# Patient Record
Sex: MALE | Race: WHITE | NOT HISPANIC OR LATINO | Employment: OTHER | URBAN - METROPOLITAN AREA
[De-identification: names, ages, dates, MRNs, and addresses within clinical notes are randomized per-mention and may not be internally consistent; named-entity substitution may affect disease eponyms.]

---

## 2017-01-11 ENCOUNTER — GENERIC CONVERSION - ENCOUNTER (OUTPATIENT)
Dept: OTHER | Facility: OTHER | Age: 47
End: 2017-01-11

## 2017-01-12 ENCOUNTER — GENERIC CONVERSION - ENCOUNTER (OUTPATIENT)
Dept: OTHER | Facility: OTHER | Age: 47
End: 2017-01-12

## 2017-01-16 ENCOUNTER — OFFICE VISIT (OUTPATIENT)
Dept: URGENT CARE | Facility: MEDICAL CENTER | Age: 47
End: 2017-01-16
Payer: COMMERCIAL

## 2017-01-16 PROCEDURE — 99203 OFFICE O/P NEW LOW 30 MIN: CPT

## 2017-02-07 ENCOUNTER — ALLSCRIPTS OFFICE VISIT (OUTPATIENT)
Dept: OTHER | Facility: OTHER | Age: 47
End: 2017-02-07

## 2017-02-28 ENCOUNTER — TRANSCRIBE ORDERS (OUTPATIENT)
Dept: ADMINISTRATIVE | Facility: HOSPITAL | Age: 47
End: 2017-02-28

## 2017-02-28 DIAGNOSIS — R91.8 PULMONARY NODULES: Primary | ICD-10-CM

## 2017-03-01 ENCOUNTER — GENERIC CONVERSION - ENCOUNTER (OUTPATIENT)
Dept: OTHER | Facility: OTHER | Age: 47
End: 2017-03-01

## 2017-03-07 ENCOUNTER — HOSPITAL ENCOUNTER (OUTPATIENT)
Dept: CT IMAGING | Facility: HOSPITAL | Age: 47
Discharge: HOME | End: 2017-03-07
Attending: INTERNAL MEDICINE
Payer: COMMERCIAL

## 2017-03-07 DIAGNOSIS — R91.8 OTHER NONSPECIFIC ABNORMAL FINDING OF LUNG FIELD: ICD-10-CM

## 2017-03-07 DIAGNOSIS — I48.91 ATRIAL FIBRILLATION (HCC): ICD-10-CM

## 2017-03-07 DIAGNOSIS — N18.30 CHRONIC KIDNEY DISEASE, STAGE III (MODERATE) (HCC): ICD-10-CM

## 2017-03-07 DIAGNOSIS — R41.3 OTHER AMNESIA: ICD-10-CM

## 2017-03-07 DIAGNOSIS — D68.62 LUPUS ANTICOAGULANT SYNDROME (HCC): ICD-10-CM

## 2017-03-07 DIAGNOSIS — I10 ESSENTIAL (PRIMARY) HYPERTENSION: ICD-10-CM

## 2017-03-07 DIAGNOSIS — E87.1 HYPO-OSMOLALITY AND HYPONATREMIA: ICD-10-CM

## 2017-03-07 DIAGNOSIS — Q61.3 POLYCYSTIC KIDNEY: ICD-10-CM

## 2017-03-07 PROCEDURE — 71250 CT THORAX DX C-: CPT

## 2017-03-08 ENCOUNTER — ALLSCRIPTS OFFICE VISIT (OUTPATIENT)
Dept: OTHER | Facility: OTHER | Age: 47
End: 2017-03-08

## 2017-03-16 ENCOUNTER — ALLSCRIPTS OFFICE VISIT (OUTPATIENT)
Dept: OTHER | Facility: OTHER | Age: 47
End: 2017-03-16

## 2017-03-22 ENCOUNTER — ALLSCRIPTS OFFICE VISIT (OUTPATIENT)
Dept: OTHER | Facility: OTHER | Age: 47
End: 2017-03-22

## 2017-04-05 ENCOUNTER — APPOINTMENT (OUTPATIENT)
Dept: LAB | Facility: HOSPITAL | Age: 47
End: 2017-04-05
Attending: INTERNAL MEDICINE
Payer: COMMERCIAL

## 2017-04-05 ENCOUNTER — TRANSCRIBE ORDERS (OUTPATIENT)
Dept: LAB | Facility: HOSPITAL | Age: 47
End: 2017-04-05

## 2017-04-05 DIAGNOSIS — I10 ESSENTIAL (PRIMARY) HYPERTENSION: ICD-10-CM

## 2017-04-05 DIAGNOSIS — N18.30 CHRONIC KIDNEY DISEASE, STAGE III (MODERATE) (HCC): Primary | ICD-10-CM

## 2017-04-05 DIAGNOSIS — Q61.3 POLYCYSTIC KIDNEY: ICD-10-CM

## 2017-04-05 DIAGNOSIS — N18.30 CHRONIC KIDNEY DISEASE, STAGE III (MODERATE) (HCC): ICD-10-CM

## 2017-04-05 DIAGNOSIS — E87.1 HYPO-OSMOLALITY AND HYPONATREMIA: ICD-10-CM

## 2017-04-05 LAB
ANION GAP SERPL CALCULATED.3IONS-SCNC: 5 MMOL/L (ref 4–13)
BUN SERPL-MCNC: 17 MG/DL (ref 5–25)
CALCIUM SERPL-MCNC: 9 MG/DL (ref 8.3–10.1)
CHLORIDE SERPL-SCNC: 104 MMOL/L (ref 100–108)
CO2 SERPL-SCNC: 30 MMOL/L (ref 21–32)
CREAT SERPL-MCNC: 1.24 MG/DL (ref 0.6–1.3)
ERYTHROCYTE [DISTWIDTH] IN BLOOD BY AUTOMATED COUNT: 12.1 % (ref 11.6–15.1)
GFR SERPL CREATININE-BSD FRML MDRD: >60 ML/MIN/1.73SQ M
GLUCOSE SERPL-MCNC: 148 MG/DL (ref 65–140)
HCT VFR BLD AUTO: 45.8 % (ref 36.5–49.3)
HGB BLD-MCNC: 15.8 G/DL (ref 12–17)
MCH RBC QN AUTO: 32 PG (ref 26.8–34.3)
MCHC RBC AUTO-ENTMCNC: 34.5 G/DL (ref 31.4–37.4)
MCV RBC AUTO: 93 FL (ref 82–98)
PHOSPHATE SERPL-MCNC: 2.4 MG/DL (ref 2.7–4.5)
PLATELET # BLD AUTO: 153 THOUSANDS/UL (ref 149–390)
PMV BLD AUTO: 11.2 FL (ref 8.9–12.7)
POTASSIUM SERPL-SCNC: 4.3 MMOL/L (ref 3.5–5.3)
PTH-INTACT SERPL-MCNC: 93.6 PG/ML (ref 14–72)
RBC # BLD AUTO: 4.94 MILLION/UL (ref 3.88–5.62)
SODIUM SERPL-SCNC: 139 MMOL/L (ref 136–145)
WBC # BLD AUTO: 4.3 THOUSAND/UL (ref 4.31–10.16)

## 2017-04-05 PROCEDURE — 83970 ASSAY OF PARATHORMONE: CPT

## 2017-04-05 PROCEDURE — 85705 THROMBOPLASTIN INHIBITION: CPT

## 2017-04-05 PROCEDURE — 80048 BASIC METABOLIC PNL TOTAL CA: CPT

## 2017-04-05 PROCEDURE — 85613 RUSSELL VIPER VENOM DILUTED: CPT

## 2017-04-05 PROCEDURE — 85598 HEXAGNAL PHOSPH PLTLT NEUTRL: CPT

## 2017-04-05 PROCEDURE — 84100 ASSAY OF PHOSPHORUS: CPT

## 2017-04-05 PROCEDURE — 36415 COLL VENOUS BLD VENIPUNCTURE: CPT

## 2017-04-05 PROCEDURE — 85732 THROMBOPLASTIN TIME PARTIAL: CPT

## 2017-04-05 PROCEDURE — 85670 THROMBIN TIME PLASMA: CPT

## 2017-04-05 PROCEDURE — 85027 COMPLETE CBC AUTOMATED: CPT

## 2017-04-08 LAB
APTT HEX PL PPP: 22 SEC (ref 0–11)
APTT SCREEN TO CONFIRM RATIO: 1.19 RATIO (ref 0–1.4)
APTT-LA IMM 4:1 NP PPP: 46.7 SEC (ref 0–40.6)
CONFIRM APTT/NORMAL: 44.7 SEC (ref 0–55)
DRVVT IMM 1:2 NP PPP: 44.4 SEC (ref 0–44)
DRVVT SCREEN TO CONFIRM RATIO: 1.2 RATIO (ref 0.8–1.2)
LA PPP-IMP: ABNORMAL
SCREEN APTT: 50.4 SEC (ref 0–43.6)
SCREEN DRVVT: 48.7 SEC (ref 0–44)
THROMBIN TIME: 17 SEC (ref 0–20.9)

## 2017-04-11 ENCOUNTER — HOSPITAL ENCOUNTER (OUTPATIENT)
Dept: MRI IMAGING | Facility: HOSPITAL | Age: 47
Discharge: HOME | End: 2017-04-11
Attending: PSYCHIATRY & NEUROLOGY
Payer: COMMERCIAL

## 2017-04-11 DIAGNOSIS — R20.2 PARESTHESIA OF SKIN: ICD-10-CM

## 2017-04-11 DIAGNOSIS — S09.90XA INJURY OF HEAD: ICD-10-CM

## 2017-04-11 PROCEDURE — 76377 3D RENDER W/INTRP POSTPROCES: CPT

## 2017-04-11 PROCEDURE — 70553 MRI BRAIN STEM W/O & W/DYE: CPT

## 2017-04-11 PROCEDURE — A9585 GADOBUTROL INJECTION: HCPCS | Performed by: PSYCHIATRY & NEUROLOGY

## 2017-04-11 RX ADMIN — GADOBUTROL 6 ML: 604.72 INJECTION INTRAVENOUS at 20:33

## 2017-04-14 ENCOUNTER — ALLSCRIPTS OFFICE VISIT (OUTPATIENT)
Dept: OTHER | Facility: OTHER | Age: 47
End: 2017-04-14

## 2017-04-18 ENCOUNTER — GENERIC CONVERSION - ENCOUNTER (OUTPATIENT)
Dept: OTHER | Facility: OTHER | Age: 47
End: 2017-04-18

## 2017-04-24 ENCOUNTER — GENERIC CONVERSION - ENCOUNTER (OUTPATIENT)
Dept: OTHER | Facility: OTHER | Age: 47
End: 2017-04-24

## 2017-04-25 ENCOUNTER — ALLSCRIPTS OFFICE VISIT (OUTPATIENT)
Dept: OTHER | Facility: OTHER | Age: 47
End: 2017-04-25

## 2017-05-01 ENCOUNTER — HOSPITAL ENCOUNTER (OUTPATIENT)
Dept: CT IMAGING | Facility: HOSPITAL | Age: 47
Discharge: HOME | End: 2017-05-01
Attending: INTERNAL MEDICINE
Payer: COMMERCIAL

## 2017-05-01 DIAGNOSIS — Q61.3 POLYCYSTIC KIDNEY: ICD-10-CM

## 2017-05-01 PROCEDURE — 74150 CT ABDOMEN W/O CONTRAST: CPT

## 2017-05-03 ENCOUNTER — ALLSCRIPTS OFFICE VISIT (OUTPATIENT)
Dept: OTHER | Facility: OTHER | Age: 47
End: 2017-05-03

## 2017-05-03 DIAGNOSIS — N18.30 CHRONIC KIDNEY DISEASE, STAGE III (MODERATE) (HCC): ICD-10-CM

## 2017-05-03 DIAGNOSIS — R93.0 ABNORMAL FINDINGS ON DIAGNOSTIC IMAGING OF SKULL AND HEAD, NOT ELSEWHERE CLASSIFIED: ICD-10-CM

## 2017-05-03 DIAGNOSIS — Q61.3 POLYCYSTIC KIDNEY: ICD-10-CM

## 2017-05-04 ENCOUNTER — TRANSCRIBE ORDERS (OUTPATIENT)
Dept: ADMINISTRATIVE | Facility: HOSPITAL | Age: 47
End: 2017-05-04

## 2017-05-04 ENCOUNTER — GENERIC CONVERSION - ENCOUNTER (OUTPATIENT)
Dept: OTHER | Facility: OTHER | Age: 47
End: 2017-05-04

## 2017-05-04 DIAGNOSIS — R93.0 FAMILIAL ENLARGEMENT OF THE SELLA TURCICA: Primary | ICD-10-CM

## 2017-05-04 DIAGNOSIS — N18.30 CHRONIC RENAL DISEASE, STAGE III (HCC): Primary | ICD-10-CM

## 2017-05-10 ENCOUNTER — ALLSCRIPTS OFFICE VISIT (OUTPATIENT)
Dept: OTHER | Facility: OTHER | Age: 47
End: 2017-05-10

## 2017-05-10 ENCOUNTER — TRANSCRIBE ORDERS (OUTPATIENT)
Dept: ADMINISTRATIVE | Facility: HOSPITAL | Age: 47
End: 2017-05-10

## 2017-05-10 ENCOUNTER — HOSPITAL ENCOUNTER (OUTPATIENT)
Dept: CT IMAGING | Facility: HOSPITAL | Age: 47
Discharge: HOME | End: 2017-05-10
Attending: INTERNAL MEDICINE
Payer: COMMERCIAL

## 2017-05-10 DIAGNOSIS — N18.30 CHRONIC KIDNEY DISEASE, STAGE III (MODERATE) (HCC): ICD-10-CM

## 2017-05-10 DIAGNOSIS — Q61.3 POLYCYSTIC KIDNEY: ICD-10-CM

## 2017-05-10 DIAGNOSIS — G56.21 LESION OF RIGHT ULNAR NERVE: Primary | ICD-10-CM

## 2017-05-10 PROCEDURE — 74160 CT ABDOMEN W/CONTRAST: CPT

## 2017-05-10 RX ADMIN — IODIXANOL 100 ML: 320 INJECTION, SOLUTION INTRAVASCULAR at 19:51

## 2017-05-15 ENCOUNTER — HOSPITAL ENCOUNTER (OUTPATIENT)
Dept: MRI IMAGING | Facility: HOSPITAL | Age: 47
Discharge: HOME | End: 2017-05-15
Payer: COMMERCIAL

## 2017-05-15 DIAGNOSIS — R93.0 ABNORMAL FINDINGS ON DIAGNOSTIC IMAGING OF SKULL AND HEAD, NOT ELSEWHERE CLASSIFIED: ICD-10-CM

## 2017-05-15 PROCEDURE — 72156 MRI NECK SPINE W/O & W/DYE: CPT

## 2017-05-15 PROCEDURE — A9585 GADOBUTROL INJECTION: HCPCS | Performed by: PSYCHIATRY & NEUROLOGY

## 2017-05-15 PROCEDURE — 72157 MRI CHEST SPINE W/O & W/DYE: CPT

## 2017-05-15 RX ADMIN — GADOBUTROL 6 ML: 604.72 INJECTION INTRAVENOUS at 20:17

## 2017-05-16 ENCOUNTER — GENERIC CONVERSION - ENCOUNTER (OUTPATIENT)
Dept: OTHER | Facility: OTHER | Age: 47
End: 2017-05-16

## 2017-05-17 ENCOUNTER — HOSPITAL ENCOUNTER (OUTPATIENT)
Dept: NEUROLOGY | Facility: CLINIC | Age: 47
Discharge: HOME | End: 2017-05-17
Payer: COMMERCIAL

## 2017-05-17 DIAGNOSIS — M79.609 PARESTHESIA AND PAIN OF RIGHT EXTREMITY: ICD-10-CM

## 2017-05-17 DIAGNOSIS — G56.21 LESION OF RIGHT ULNAR NERVE: ICD-10-CM

## 2017-05-17 DIAGNOSIS — R20.2 PARESTHESIA AND PAIN OF RIGHT EXTREMITY: ICD-10-CM

## 2017-05-17 PROCEDURE — 95886 MUSC TEST DONE W/N TEST COMP: CPT

## 2017-05-17 PROCEDURE — 95911 NRV CNDJ TEST 9-10 STUDIES: CPT

## 2017-05-19 ENCOUNTER — GENERIC CONVERSION - ENCOUNTER (OUTPATIENT)
Dept: OTHER | Facility: OTHER | Age: 47
End: 2017-05-19

## 2017-05-22 ENCOUNTER — GENERIC CONVERSION - ENCOUNTER (OUTPATIENT)
Dept: OTHER | Facility: OTHER | Age: 47
End: 2017-05-22

## 2017-05-23 ENCOUNTER — HOSPITAL ENCOUNTER (EMERGENCY)
Facility: HOSPITAL | Age: 47
Discharge: HOME | End: 2017-05-23
Attending: EMERGENCY MEDICINE | Admitting: EMERGENCY MEDICINE
Payer: COMMERCIAL

## 2017-05-23 ENCOUNTER — GENERIC CONVERSION - ENCOUNTER (OUTPATIENT)
Dept: OTHER | Facility: OTHER | Age: 47
End: 2017-05-23

## 2017-05-23 ENCOUNTER — APPOINTMENT (EMERGENCY)
Dept: ULTRASOUND IMAGING | Facility: HOSPITAL | Age: 47
End: 2017-05-23
Payer: COMMERCIAL

## 2017-05-23 VITALS
WEIGHT: 148 LBS | OXYGEN SATURATION: 96 % | TEMPERATURE: 98.1 F | DIASTOLIC BLOOD PRESSURE: 106 MMHG | HEART RATE: 74 BPM | RESPIRATION RATE: 16 BRPM | BODY MASS INDEX: 22.5 KG/M2 | SYSTOLIC BLOOD PRESSURE: 184 MMHG

## 2017-05-23 DIAGNOSIS — R10.9 LEFT FLANK PAIN: Primary | ICD-10-CM

## 2017-05-23 LAB
ANION GAP BLD CALC-SCNC: 18 MMOL/L (ref 4–13)
BACTERIA UR QL AUTO: ABNORMAL /HPF
BILIRUB UR QL STRIP: NEGATIVE
BUN BLD-MCNC: 21 MG/DL (ref 5–25)
CA-I BLD-SCNC: 1.18 MMOL/L (ref 1.12–1.32)
CHLORIDE BLD-SCNC: 100 MMOL/L (ref 100–108)
CLARITY UR: CLEAR
COLOR UR: YELLOW
COLOR, POC: YELLOW
CREAT BLD-MCNC: 1.1 MG/DL (ref 0.6–1.3)
GFR SERPL CREATININE-BSD FRML MDRD: >60 ML/MIN/1.73SQ M
GLUCOSE SERPL-MCNC: 81 MG/DL (ref 65–140)
GLUCOSE UR STRIP-MCNC: NEGATIVE MG/DL
HCT VFR BLD CALC: 49 % (ref 36.5–49.3)
HGB BLDA-MCNC: 16.7 G/DL (ref 12–17)
HGB UR QL STRIP.AUTO: ABNORMAL
KETONES UR STRIP-MCNC: NEGATIVE MG/DL
LEUKOCYTE ESTERASE UR QL STRIP: NEGATIVE
NITRITE UR QL STRIP: NEGATIVE
NON-SQ EPI CELLS URNS QL MICRO: ABNORMAL /HPF
PCO2 BLD: 27 MMOL/L (ref 21–32)
PH UR STRIP.AUTO: 6 [PH] (ref 4.5–8)
POTASSIUM BLD-SCNC: 4.2 MMOL/L (ref 3.5–5.3)
PROT UR STRIP-MCNC: NEGATIVE MG/DL
RBC #/AREA URNS AUTO: ABNORMAL /HPF
SODIUM BLD-SCNC: 140 MMOL/L (ref 136–145)
SP GR UR STRIP.AUTO: 1.01 (ref 1–1.03)
SPECIMEN SOURCE: ABNORMAL
UROBILINOGEN UR QL STRIP.AUTO: 0.2 E.U./DL
WBC #/AREA URNS AUTO: ABNORMAL /HPF

## 2017-05-23 PROCEDURE — 76770 US EXAM ABDO BACK WALL COMP: CPT

## 2017-05-23 PROCEDURE — 80047 BASIC METABLC PNL IONIZED CA: CPT

## 2017-05-23 PROCEDURE — 85014 HEMATOCRIT: CPT

## 2017-05-23 PROCEDURE — 99284 EMERGENCY DEPT VISIT MOD MDM: CPT

## 2017-05-23 PROCEDURE — 81001 URINALYSIS AUTO W/SCOPE: CPT

## 2017-05-23 PROCEDURE — 81002 URINALYSIS NONAUTO W/O SCOPE: CPT | Performed by: EMERGENCY MEDICINE

## 2017-05-24 ENCOUNTER — GENERIC CONVERSION - ENCOUNTER (OUTPATIENT)
Dept: OTHER | Facility: OTHER | Age: 47
End: 2017-05-24

## 2017-05-25 ENCOUNTER — GENERIC CONVERSION - ENCOUNTER (OUTPATIENT)
Dept: OTHER | Facility: OTHER | Age: 47
End: 2017-05-25

## 2017-06-06 ENCOUNTER — ALLSCRIPTS OFFICE VISIT (OUTPATIENT)
Dept: OTHER | Facility: OTHER | Age: 47
End: 2017-06-06

## 2017-06-06 DIAGNOSIS — N20.0 CALCULUS OF KIDNEY: ICD-10-CM

## 2017-06-06 LAB
CLARITY UR: NORMAL
COLOR UR: YELLOW
GLUCOSE (HISTORICAL): NORMAL
HGB UR QL STRIP.AUTO: NORMAL
KETONES UR STRIP-MCNC: NORMAL MG/DL
LEUKOCYTE ESTERASE UR QL STRIP: NORMAL
NITRITE UR QL STRIP: NORMAL
PH UR STRIP.AUTO: 6 [PH]
PROT UR STRIP-MCNC: NORMAL MG/DL
SP GR UR STRIP.AUTO: 1.01

## 2017-06-07 ENCOUNTER — ALLSCRIPTS OFFICE VISIT (OUTPATIENT)
Dept: OTHER | Facility: OTHER | Age: 47
End: 2017-06-07

## 2017-06-21 ENCOUNTER — GENERIC CONVERSION - ENCOUNTER (OUTPATIENT)
Dept: OTHER | Facility: OTHER | Age: 47
End: 2017-06-21

## 2017-08-03 ENCOUNTER — ALLSCRIPTS OFFICE VISIT (OUTPATIENT)
Dept: OTHER | Facility: OTHER | Age: 47
End: 2017-08-03

## 2017-08-03 ENCOUNTER — HOSPITAL ENCOUNTER (OUTPATIENT)
Dept: RADIOLOGY | Facility: HOSPITAL | Age: 47
Discharge: HOME | End: 2017-08-03
Attending: ORTHOPAEDIC SURGERY
Payer: COMMERCIAL

## 2017-08-03 DIAGNOSIS — R26.89 OTHER ABNORMALITIES OF GAIT AND MOBILITY: ICD-10-CM

## 2017-08-03 DIAGNOSIS — M25.551 PAIN IN RIGHT HIP: ICD-10-CM

## 2017-08-03 DIAGNOSIS — M25.552 PAIN IN LEFT HIP: ICD-10-CM

## 2017-08-03 DIAGNOSIS — N18.30 CHRONIC KIDNEY DISEASE, STAGE III (MODERATE) (HCC): ICD-10-CM

## 2017-08-03 DIAGNOSIS — I48.91 ATRIAL FIBRILLATION (HCC): ICD-10-CM

## 2017-08-03 DIAGNOSIS — M79.643 PAIN OF HAND: ICD-10-CM

## 2017-08-03 DIAGNOSIS — D68.61 ANTIPHOSPHOLIPID SYNDROME (HCC): ICD-10-CM

## 2017-08-03 DIAGNOSIS — F41.9 ANXIETY DISORDER: ICD-10-CM

## 2017-08-03 DIAGNOSIS — Q61.3 POLYCYSTIC KIDNEY: ICD-10-CM

## 2017-08-03 DIAGNOSIS — M54.16 RADICULOPATHY OF LUMBAR REGION: ICD-10-CM

## 2017-08-03 DIAGNOSIS — L70.0 ACNE VULGARIS: ICD-10-CM

## 2017-08-03 DIAGNOSIS — I10 ESSENTIAL (PRIMARY) HYPERTENSION: ICD-10-CM

## 2017-08-03 PROCEDURE — 73521 X-RAY EXAM HIPS BI 2 VIEWS: CPT

## 2017-08-09 ENCOUNTER — APPOINTMENT (OUTPATIENT)
Dept: LAB | Facility: MEDICAL CENTER | Age: 47
End: 2017-08-09
Payer: COMMERCIAL

## 2017-08-09 ENCOUNTER — TRANSCRIBE ORDERS (OUTPATIENT)
Dept: ADMINISTRATIVE | Facility: HOSPITAL | Age: 47
End: 2017-08-09

## 2017-08-09 DIAGNOSIS — Z00.8 HEALTH EXAMINATION IN POPULATION SURVEY: Primary | ICD-10-CM

## 2017-08-09 DIAGNOSIS — Z00.8 HEALTH EXAMINATION IN POPULATION SURVEY: ICD-10-CM

## 2017-08-09 LAB
CHOLEST SERPL-MCNC: 148 MG/DL (ref 50–200)
EST. AVERAGE GLUCOSE BLD GHB EST-MCNC: 100 MG/DL
HBA1C MFR BLD: 5.1 % (ref 4.2–6.3)
HDLC SERPL-MCNC: 58 MG/DL (ref 40–60)
LDLC SERPL CALC-MCNC: 80 MG/DL (ref 0–100)
TRIGL SERPL-MCNC: 50 MG/DL

## 2017-08-09 PROCEDURE — 83036 HEMOGLOBIN GLYCOSYLATED A1C: CPT

## 2017-08-09 PROCEDURE — 36415 COLL VENOUS BLD VENIPUNCTURE: CPT

## 2017-08-09 PROCEDURE — 80061 LIPID PANEL: CPT

## 2017-08-10 ENCOUNTER — ALLSCRIPTS OFFICE VISIT (OUTPATIENT)
Dept: OTHER | Facility: OTHER | Age: 47
End: 2017-08-10

## 2017-08-10 LAB
BILIRUB UR QL STRIP: NORMAL
CLARITY UR: NORMAL
COLOR UR: YELLOW
GLUCOSE (HISTORICAL): NORMAL
HGB UR QL STRIP.AUTO: NORMAL
KETONES UR STRIP-MCNC: NORMAL MG/DL
LEUKOCYTE ESTERASE UR QL STRIP: NORMAL
NITRITE UR QL STRIP: NORMAL
PH UR STRIP.AUTO: 5 [PH]
PROT UR STRIP-MCNC: NORMAL MG/DL
SP GR UR STRIP.AUTO: 1
UROBILINOGEN UR QL STRIP.AUTO: NORMAL

## 2017-08-15 ENCOUNTER — TRANSCRIBE ORDERS (OUTPATIENT)
Dept: ADMINISTRATIVE | Facility: HOSPITAL | Age: 47
End: 2017-08-15

## 2017-08-15 DIAGNOSIS — M54.16 LUMBAR RADICULOPATHY: Primary | ICD-10-CM

## 2017-08-16 ENCOUNTER — ALLSCRIPTS OFFICE VISIT (OUTPATIENT)
Dept: OTHER | Facility: OTHER | Age: 47
End: 2017-08-16

## 2017-08-16 ENCOUNTER — GENERIC CONVERSION - ENCOUNTER (OUTPATIENT)
Dept: OTHER | Facility: OTHER | Age: 47
End: 2017-08-16

## 2017-08-17 ENCOUNTER — ALLSCRIPTS OFFICE VISIT (OUTPATIENT)
Dept: OTHER | Facility: OTHER | Age: 47
End: 2017-08-17

## 2017-08-17 ENCOUNTER — HOSPITAL ENCOUNTER (OUTPATIENT)
Dept: RADIOLOGY | Facility: HOSPITAL | Age: 47
Discharge: HOME | End: 2017-08-17
Attending: ORTHOPAEDIC SURGERY
Payer: COMMERCIAL

## 2017-08-17 DIAGNOSIS — M79.643 PAIN OF HAND: ICD-10-CM

## 2017-08-17 PROCEDURE — 73130 X-RAY EXAM OF HAND: CPT

## 2017-08-18 ENCOUNTER — GENERIC CONVERSION - ENCOUNTER (OUTPATIENT)
Dept: OTHER | Facility: OTHER | Age: 47
End: 2017-08-18

## 2017-08-21 ENCOUNTER — TRANSCRIBE ORDERS (OUTPATIENT)
Dept: ADMINISTRATIVE | Facility: HOSPITAL | Age: 47
End: 2017-08-21

## 2017-08-21 ENCOUNTER — APPOINTMENT (OUTPATIENT)
Dept: LAB | Facility: MEDICAL CENTER | Age: 47
End: 2017-08-21
Payer: COMMERCIAL

## 2017-08-21 ENCOUNTER — HOSPITAL ENCOUNTER (OUTPATIENT)
Dept: MRI IMAGING | Facility: HOSPITAL | Age: 47
End: 2017-08-21
Attending: ORTHOPAEDIC SURGERY
Payer: COMMERCIAL

## 2017-08-21 DIAGNOSIS — I10 ESSENTIAL (PRIMARY) HYPERTENSION: ICD-10-CM

## 2017-08-21 LAB
ANION GAP SERPL CALCULATED.3IONS-SCNC: 7 MMOL/L (ref 4–13)
BUN SERPL-MCNC: 17 MG/DL (ref 5–25)
CALCIUM SERPL-MCNC: 9.5 MG/DL (ref 8.3–10.1)
CHLORIDE SERPL-SCNC: 104 MMOL/L (ref 100–108)
CO2 SERPL-SCNC: 28 MMOL/L (ref 21–32)
CREAT SERPL-MCNC: 1.17 MG/DL (ref 0.6–1.3)
GFR SERPL CREATININE-BSD FRML MDRD: 74 ML/MIN/1.73SQ M
GLUCOSE P FAST SERPL-MCNC: 86 MG/DL (ref 65–99)
POTASSIUM SERPL-SCNC: 5.3 MMOL/L (ref 3.5–5.3)
SODIUM SERPL-SCNC: 139 MMOL/L (ref 136–145)
TSH SERPL DL<=0.05 MIU/L-ACNC: 1.05 UIU/ML (ref 0.36–3.74)

## 2017-08-21 PROCEDURE — 36415 COLL VENOUS BLD VENIPUNCTURE: CPT

## 2017-08-21 PROCEDURE — 84443 ASSAY THYROID STIM HORMONE: CPT

## 2017-08-21 PROCEDURE — 83835 ASSAY OF METANEPHRINES: CPT

## 2017-08-21 PROCEDURE — 84244 ASSAY OF RENIN: CPT

## 2017-08-21 PROCEDURE — 80048 BASIC METABOLIC PNL TOTAL CA: CPT

## 2017-08-21 PROCEDURE — 82088 ASSAY OF ALDOSTERONE: CPT

## 2017-08-23 ENCOUNTER — GENERIC CONVERSION - ENCOUNTER (OUTPATIENT)
Dept: OTHER | Facility: OTHER | Age: 47
End: 2017-08-23

## 2017-08-25 ENCOUNTER — HOSPITAL ENCOUNTER (OUTPATIENT)
Dept: ULTRASOUND IMAGING | Facility: MEDICAL CENTER | Age: 47
Discharge: HOME | End: 2017-08-25
Payer: COMMERCIAL

## 2017-08-25 ENCOUNTER — APPOINTMENT (OUTPATIENT)
Dept: LAB | Facility: MEDICAL CENTER | Age: 47
End: 2017-08-25
Payer: COMMERCIAL

## 2017-08-25 DIAGNOSIS — N18.30 CHRONIC KIDNEY DISEASE, STAGE III (MODERATE) (HCC): ICD-10-CM

## 2017-08-25 DIAGNOSIS — I10 ESSENTIAL (PRIMARY) HYPERTENSION: ICD-10-CM

## 2017-08-25 LAB
ANION GAP SERPL CALCULATED.3IONS-SCNC: 6 MMOL/L (ref 4–13)
BUN SERPL-MCNC: 19 MG/DL (ref 5–25)
CALCIUM SERPL-MCNC: 9.9 MG/DL (ref 8.3–10.1)
CHLORIDE SERPL-SCNC: 104 MMOL/L (ref 100–108)
CO2 SERPL-SCNC: 28 MMOL/L (ref 21–32)
CREAT SERPL-MCNC: 1.31 MG/DL (ref 0.6–1.3)
GFR SERPL CREATININE-BSD FRML MDRD: 64 ML/MIN/1.73SQ M
GLUCOSE P FAST SERPL-MCNC: 112 MG/DL (ref 65–99)
METANEPH FREE SERPL-MCNC: 47 PG/ML (ref 0–62)
NORMETANEPHRINE SERPL-MCNC: 83 PG/ML (ref 0–145)
POTASSIUM SERPL-SCNC: 4.8 MMOL/L (ref 3.5–5.3)
RENIN PLAS-CCNC: 4.5 NG/ML/HR (ref 0.17–5.38)
SODIUM SERPL-SCNC: 138 MMOL/L (ref 136–145)

## 2017-08-25 PROCEDURE — 93975 VASCULAR STUDY: CPT

## 2017-08-25 PROCEDURE — 80048 BASIC METABOLIC PNL TOTAL CA: CPT

## 2017-08-25 PROCEDURE — 36415 COLL VENOUS BLD VENIPUNCTURE: CPT

## 2017-08-27 LAB — ALDOST SERPL-MCNC: 7.1 NG/DL (ref 0–30)

## 2017-08-28 ENCOUNTER — ALLSCRIPTS OFFICE VISIT (OUTPATIENT)
Dept: OTHER | Facility: OTHER | Age: 47
End: 2017-08-28

## 2017-08-30 ENCOUNTER — APPOINTMENT (OUTPATIENT)
Dept: LAB | Facility: HOSPITAL | Age: 47
End: 2017-08-30
Attending: INTERNAL MEDICINE
Payer: COMMERCIAL

## 2017-08-30 ENCOUNTER — TRANSCRIBE ORDERS (OUTPATIENT)
Dept: ADMINISTRATIVE | Facility: HOSPITAL | Age: 47
End: 2017-08-30

## 2017-08-30 DIAGNOSIS — I10 ESSENTIAL HYPERTENSION, MALIGNANT: Primary | ICD-10-CM

## 2017-08-30 DIAGNOSIS — I10 ESSENTIAL HYPERTENSION, MALIGNANT: ICD-10-CM

## 2017-08-30 LAB — CORTIS AM PEAK SERPL-MCNC: 17 UG/DL (ref 4.2–22.4)

## 2017-08-30 PROCEDURE — 82533 TOTAL CORTISOL: CPT

## 2017-08-31 ENCOUNTER — APPOINTMENT (OUTPATIENT)
Dept: LAB | Facility: HOSPITAL | Age: 47
End: 2017-08-31
Attending: UROLOGY
Payer: COMMERCIAL

## 2017-08-31 DIAGNOSIS — N20.0 CALCULUS OF KIDNEY: ICD-10-CM

## 2017-08-31 PROCEDURE — 82570 ASSAY OF URINE CREATININE: CPT

## 2017-08-31 PROCEDURE — 82340 ASSAY OF CALCIUM IN URINE: CPT

## 2017-08-31 PROCEDURE — 81003 URINALYSIS AUTO W/O SCOPE: CPT

## 2017-08-31 PROCEDURE — 83735 ASSAY OF MAGNESIUM: CPT

## 2017-08-31 PROCEDURE — 82436 ASSAY OF URINE CHLORIDE: CPT

## 2017-08-31 PROCEDURE — 82131 AMINO ACIDS SINGLE QUANT: CPT

## 2017-08-31 PROCEDURE — 84105 ASSAY OF URINE PHOSPHORUS: CPT

## 2017-08-31 PROCEDURE — 82140 ASSAY OF AMMONIA: CPT

## 2017-08-31 PROCEDURE — 82507 ASSAY OF CITRATE: CPT

## 2017-08-31 PROCEDURE — 84300 ASSAY OF URINE SODIUM: CPT

## 2017-08-31 PROCEDURE — 84392 ASSAY OF URINE SULFATE: CPT

## 2017-08-31 PROCEDURE — 83945 ASSAY OF OXALATE: CPT

## 2017-08-31 PROCEDURE — 84560 ASSAY OF URINE/URIC ACID: CPT

## 2017-08-31 PROCEDURE — 84133 ASSAY OF URINE POTASSIUM: CPT

## 2017-08-31 PROCEDURE — 83935 ASSAY OF URINE OSMOLALITY: CPT

## 2017-09-01 ENCOUNTER — ALLSCRIPTS OFFICE VISIT (OUTPATIENT)
Dept: OTHER | Facility: OTHER | Age: 47
End: 2017-09-01

## 2017-09-01 ENCOUNTER — HOSPITAL ENCOUNTER (OUTPATIENT)
Dept: MRI IMAGING | Facility: HOSPITAL | Age: 47
Discharge: HOME | End: 2017-09-01
Attending: ORTHOPAEDIC SURGERY
Payer: COMMERCIAL

## 2017-09-01 DIAGNOSIS — M54.16 RADICULOPATHY OF LUMBAR REGION: ICD-10-CM

## 2017-09-01 PROCEDURE — 72148 MRI LUMBAR SPINE W/O DYE: CPT

## 2017-09-11 DIAGNOSIS — N18.30 CHRONIC KIDNEY DISEASE, STAGE III (MODERATE) (HCC): ICD-10-CM

## 2017-09-12 LAB
AMMONIA 24H UR-MRATE: 18 MEQ/24 HR
AMMONIA UR-SCNC: ABNORMAL UG/DL
CA H2 PHOS DIHYD CRY URNS QL MICRO: 1.22 RATIO (ref 0–3)
CALCIUM 24H UR-MCNC: 22.4 MG/DL
CALCIUM 24H UR-MRATE: 291.2 MG/24 HR (ref 100–300)
CHLORIDE 24H UR-SCNC: 49 MMOL/L
CHLORIDE 24H UR-SRATE: 64 MMOL/24 HR (ref 110–250)
CITRATE 24H UR-MCNC: 182 MG/L
CITRATE 24H UR-MRATE: 237 MG/24 HR (ref 320–1240)
COM CRY STONE QL IR: 6.34 RATIO (ref 0–6)
CREAT 24H UR-MCNC: 70.2 MG/DL
CREAT 24H UR-MRATE: 912.6 MG/24 HR (ref 1000–2000)
CYSTINE 24H UR-MCNC: 5.66 MG/L
CYSTINE 24H UR-MRATE: 7.36 MG/24 HR (ref 10–100)
MAGNESIUM 24H UR-MRATE: 81 MG/24 HR (ref 12–293)
MAGNESIUM UR-MCNC: 6.2 MG/DL
NA URATE CRY STONE QL IR: 0.64 RATIO (ref 0–4)
OSMOLALITY UR: 344 MOSMOL/KG (ref 300–900)
OXALATE 24H UR-MRATE: 14 MG/24 HR (ref 7–44)
OXALATE UR-MCNC: 11 MG/L
PH 24H UR: 5.6 [PH]
PHOSPHATE 24H UR-MCNC: 42.3 MG/DL
PHOSPHATE 24H UR-MRATE: 549.9 MG/24 HR (ref 400–1300)
PLEASE NOTE (STONE RISK): ABNORMAL
POTASSIUM 24H UR-SCNC: 26.9 MMOL/24 HR (ref 25–125)
POTASSIUM UR-SCNC: 20.7 MMOL/L
PRESERVED URINE: 1300 ML/24 HR (ref 800–1800)
SODIUM 24H UR-SCNC: 52 MMOL/L
SODIUM 24H UR-SRATE: 68 MMOL/24 HR (ref 58–337)
SPECIMEN VOL 24H UR: 1300 ML/24 HR (ref 800–1800)
SULFATE 24H UR-MCNC: 18 MEQ/24 HR (ref 0–30)
SULFATE UR-MCNC: 14 MEQ/L
TRI-PHOS CRY STONE MICRO: 0.01 RATIO (ref 0–1)
URATE 24H UR-MCNC: 14.9 MG/DL
URATE 24H UR-MRATE: 194 MG/24 HR (ref 250–750)
URATE DIHYD CRY STONE QL IR: 1.21 RATIO (ref 0–1.2)

## 2017-09-21 ENCOUNTER — ALLSCRIPTS OFFICE VISIT (OUTPATIENT)
Dept: OTHER | Facility: OTHER | Age: 47
End: 2017-09-21

## 2017-09-26 ENCOUNTER — GENERIC CONVERSION - ENCOUNTER (OUTPATIENT)
Dept: OTHER | Facility: OTHER | Age: 47
End: 2017-09-26

## 2017-09-27 ENCOUNTER — GENERIC CONVERSION - ENCOUNTER (OUTPATIENT)
Dept: OTHER | Facility: OTHER | Age: 47
End: 2017-09-27

## 2017-09-27 ENCOUNTER — APPOINTMENT (OUTPATIENT)
Dept: LAB | Age: 47
End: 2017-09-27
Payer: COMMERCIAL

## 2017-09-27 ENCOUNTER — TRANSCRIBE ORDERS (OUTPATIENT)
Dept: ADMINISTRATIVE | Age: 47
End: 2017-09-27

## 2017-09-27 DIAGNOSIS — N18.30 CHRONIC KIDNEY DISEASE, STAGE III (MODERATE) (HCC): ICD-10-CM

## 2017-09-27 LAB
ANION GAP SERPL CALCULATED.3IONS-SCNC: 8 MMOL/L (ref 4–13)
BUN SERPL-MCNC: 22 MG/DL (ref 5–25)
CALCIUM SERPL-MCNC: 9 MG/DL (ref 8.3–10.1)
CHLORIDE SERPL-SCNC: 95 MMOL/L (ref 100–108)
CO2 SERPL-SCNC: 30 MMOL/L (ref 21–32)
CREAT SERPL-MCNC: 1.34 MG/DL (ref 0.6–1.3)
GFR SERPL CREATININE-BSD FRML MDRD: 63 ML/MIN/1.73SQ M
GLUCOSE P FAST SERPL-MCNC: 132 MG/DL (ref 65–99)
POTASSIUM SERPL-SCNC: 2.9 MMOL/L (ref 3.5–5.3)
SODIUM SERPL-SCNC: 133 MMOL/L (ref 136–145)

## 2017-09-27 PROCEDURE — 36415 COLL VENOUS BLD VENIPUNCTURE: CPT

## 2017-09-27 PROCEDURE — 80048 BASIC METABOLIC PNL TOTAL CA: CPT

## 2017-09-28 ENCOUNTER — GENERIC CONVERSION - ENCOUNTER (OUTPATIENT)
Dept: OTHER | Facility: OTHER | Age: 47
End: 2017-09-28

## 2017-10-05 ENCOUNTER — GENERIC CONVERSION - ENCOUNTER (OUTPATIENT)
Dept: OTHER | Facility: OTHER | Age: 47
End: 2017-10-05

## 2017-10-17 ENCOUNTER — ALLSCRIPTS OFFICE VISIT (OUTPATIENT)
Dept: OTHER | Facility: OTHER | Age: 47
End: 2017-10-17

## 2017-10-18 ENCOUNTER — ALLSCRIPTS OFFICE VISIT (OUTPATIENT)
Dept: OTHER | Facility: OTHER | Age: 47
End: 2017-10-18

## 2017-10-19 NOTE — PROGRESS NOTES
Assessment  Assessed    1  Atrial fibrillation (427 31) (I48 91)   2  Benign essential hypertension (401 1) (I10)   3  Palpitations (785 1) (R00 2)    Plan  Atrial fibrillation    · ECHO STRESS TEST W CONTRAST IF INDICATED; Status:Need Information - Financial  Authorization; Requested for:18Oct2017;    Perform:[facility] Radiology; Due:62Gxv3225; Ordered; For:Atrial fibrillation; Ordered By:Haseeb Mittal;   · EKG/ECG- POC; Status:Complete;   Done: 30QDR5266 09:30AM   Perform: In Office; Last Updated By:Vanessa Robertson; 10/18/2017 9:30:16 AM;Ordered; For:Atrial fibrillation; Ordered By:Haseeb Mittal;  Palpitations    · Follow-up visit in 6 months Evaluation and Treatment  Follow-up  Status: Hold For -  Scheduling  Requested for: 58SJX6224   Ordered; For: Palpitations; Ordered By: Wilber Tyler Performed:  Due: 03LOJ4889    Discussion/Summary  Cardiology Discussion Summary Free Text Note Form [address]:   #1  Paroxysmal atrial fibrillation: Infrequent palpitations , although patient states has been somewhat worsening compared to the years past  Instructed patient to call if any worsening frequency or severity of episodes of palpitations / atrial fibrillation, in which case antiarrhythmic treatment may be considered  For now, continue carvedilol, continue aspirin only for CHADSVASC 1 (htn)  Will schedule stress echocardiogram prior to next visit in 6 months, in case we need to start IC antiarrhythmics (Flecainide)  Hypertension: controlled, patient will go home and call regarding the exact medications and dosages that he is taking  He does not think he is on both nifedipine and amlodipine at this time  Chronic kidney disease with polycystic kidney disease: Follow-up with Nephrology  in 6 months  Chief Complaint  Chief Complaint Free Text Note Form: 6 month follow up with EKG for AF      History of Present Illness  Cardiology HPI Free Text Note Form [facility]:  This is a 14-year-old male with a significant history of paroxysmal atrial fibrillation diagnosed in January 2013, in addition to a history of hypertension, chronic kidney disease, polycystic kidney disease, and tobacco abuse who has seen Dr Lakeshia Donnelly in the past   has remained on aspirin alone  He has followed rheumatology and neurology for issues w/ memory loss, possibly focal neurologic deficits and has been dx'd w/ +Lupus anticoagulant  he has never been started on anticoagulation  the perspective of AF, he gets palpiations every few months, but tells me today that they have been occurring somewhat more frequently than in the years past   They can last several minutes up to several hours, without significant symptoms  He states he measures his heart rate typically between 120 to 130s  His antihypertensives have been adjusted by his nephrologist       Review of Systems  Cardiology Male ROS:     Cardiac: rhythm problems-- and-- palpitations present   Skin: No complaints of nonhealing sores or skin rash  Genitourinary: No complaints of recurrent urinary tract infections, frequent urination at night, difficult urination, blood in urine, kidney stones, loss of bladder control, no kidney or prostate problems, no erectile dysfunction  Psychological: No complaints of feeling depressed, anxiety, panic attacks, or difficulty concentrating  General: changes in weight lbs  Respiratory: shortness of breath  Gastrointestinal: diarrhea-- and-- abdonimal pain-- IBS  Hematologic: bleeding disorders-- Lupus anticoag syndrome  Neurological: numbnes,-- tingling,-- weakness-- and-- headaches   Musculoskeletal: arthritis-- and-- back pain       ROS Reviewed:   ROS reviewed  Active Problems  Problems    1  Abnormal EEG (794 02) (R94 01)   2  Acne vulgaris (706 1) (L70 0)   3  Antiphospholipid antibody syndrome (289 81) (D68 61)   4  Anxiety (300 00) (F41 9)   5  Atrial fibrillation (427 31) (I48 91)   6  Balance disorder (781 99) (R26 89)   7   Benign essential hypertension (401  1) (I10)   8  Bilateral hip pain (719 45) (M25 551,M25 552)   9  Cervical disc disease (722 91) (M50 90)   10  Cervical neuritis (723 4) (M54 12)   11  Cervical radiculitis (723 4) (M54 12)   12  Chronic daily headache (784 0) (R51)   13  Chronic kidney disease, stage 3 (585 3) (N18 3)   14  Chronic nausea (787 02) (R11 0)   15  Esophageal reflux (530 81) (K21 9)   16  Gross hematuria (599 71) (R31 0)   17  Hand pain (729 5) (M79 643)   18  Hemiparesthesia (782 0) (R20 2)   19  Hypertension (401 9) (I10)   20  Hyponatremia (276 1) (E87 1)   21  Inflammatory arthropathy (714 9) (M19 90)   22  Irritable bowel syndrome with diarrhea (564 1) (K58 0)   23  Kidney cysts (753 10) (N28 1)   24  Left nephrolithiasis (592 0) (N20 0)   25  Lumbar radiculopathy (724 4) (M54 16)   26  Lupus anticoagulant positive (795 79) (R76 0)   27  Memory loss (780 93) (R41 3)   28  Nausea (787 02) (R11 0)   29  Nephrolithiasis (592 0) (N20 0)   30  Neurocognitive deficits (781 99) (R29 818,R41 89)   31  Oscillopsia (368 15) (H53 19)   32  Osteoarthritis of both knees (715 96) (M17 0)   33  Polycystic kidney (753 12) (Q61 3)   34  Pulmonary nodules (793 19) (R91 8)   35  Transient right leg weakness (729 89) (R29 898)   36  Ulnar neuritis, right (723 4) (G56 21)   37  White matter abnormality on MRI of brain (793 0) (R93 0)    Past Medical History  Problems    1  History of Closed Fracture Of The Dorsal Cortical Left Triquetral Bone (814 03)   2  History of Closed Fracture Of The Left Triquetral Bone (814 03)   3  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)   4  History of Erectile dysfunction of non-organic origin (302 72) (F52 21)   5  History of Esophagitis, reflux (530 11) (K21 0)   6  History of arthritis (V13 4) (Z87 39)   7  History of head injury (V15 59) (Z87 828)   8  History of hypertension (V12 59) (Z86 79)   9  History of kidney disease (V13 09) (Z87 448)   10  History of Joint Pain In Both Knees (719 46)   11   History of Left Trapezius Muscle Strain (840 8)   12  History of Light Colored Bowel Movement (Acholic Stools) (691 6)  Active Problems And Past Medical History Reviewed: The active problems and past medical history were reviewed and updated today  Surgical History  Problems    1  History of Hernia Repair   2  History of Knee Arthroscopy (Therapeutic)   3  History of Shoulder Surgery  Surgical History Reviewed: The surgical history was reviewed and updated today  Family History  Mother    1  Family history of Blood pressure instability   2  Family history of migraine headaches (V17 2) (Z82 0)   3  Family history of transient ischemic attacks (V17 1) (Z82 3)  Father    4  Family history of Paternity Unknown  Maternal Grandmother    5  Family history of Alzheimer's disease (V17 2) (Z82 0)   6  Family history of rheumatoid arthritis (V17 7) (Z82 61)  Maternal Grandfather    7  Family history of cerebrovascular accident (CVA) (V17 1) (Z82 3)   8  Family history of stroke (V17 1) (Z82 3)   9  Family history of transient ischemic attacks (V17 1) (Z82 3)  Family History    10  Family history of Arthritis (V17 7)   11  Family history of hypertension (V17 49) (Z82 49)   12  Family history of Osteoporosis (V17 81)  Family History Reviewed: The family history was reviewed and updated today  Social History  Problems    · Being A Social Drinker   · Caffeine Use   · Completed 12th grade   · Current every day smoker (305 1) (F17 200)   · Employed   · History of marijuana use (305 23) (Z87 898)   ·   Social History Reviewed: The social history was reviewed and updated today  Current Meds   1  AmLODIPine Besylate 10 MG Oral Tablet; one po qd; Therapy: 31XGB0713 to Recorded   2  Aspirin 81 MG TABS; TAKE 1 TABLET DAILY; Therapy: 41QTR3364 to (Evaluate:13Mar2016); Last Rx:30Uhn8182 Ordered   3  Carvedilol 12 5 MG Oral Tablet; two tables twice daily;    Therapy: 56MVY3858 to  Requested for: 14Apr2017 Recorded   4  Chlorthalidone 25 MG Oral Tablet; take 1 capsule by mouth daily; Therapy: 45Kpo9338 to (Evaluate:87Bgo1189)  Requested for: 45Rxm4041; Last   Rx:01Sep2017 Ordered   5  NIFEdipine ER 30 MG Oral Tablet Extended Release 24 Hour; Take 1 tablet twice daily; Therapy: 36KLR1163 to (Claria Police)  Requested for: 88JRQ8384; Last   Rx:45Eym1928 Ordered   6  Potassium Chloride ER 10 MEQ Oral Tablet Extended Release; TAKE 1 TABLET DAILY; Therapy: 79PWE2614 to (SUMCTEYO:61SHR4135)  Requested for: 58DIN4188; Last   Rx:33Fhn3243 Ordered   7  Xanax XR 1 MG Oral Tablet Extended Release 24 Hour; One po hs; Therapy: 81LDV1285 to Recorded  Medication List Reviewed: The medication list was reviewed and updated today  Allergies  Medication    1  Codeine Sulfate TABS    Vitals  Vital Signs    Recorded: 37LTE7780 09:41AM   Heart Rate 56, Apical   Pulse Quality Regular, Apical   Systolic 374, RUE, Sitting   Diastolic 80, RUE, Sitting   Height 5 ft 8 in   Weight 138 lb    BMI Calculated 20 98   BSA Calculated 1 75     Physical Exam    Constitutional   General appearance: No acute distress, well appearing and well nourished  Eyes   Conjunctiva and Sclera examination: Conjunctiva pink, sclera anicteric  Ears, Nose, Mouth, and Throat - External inspection of ears and nose: Normal without deformities or discharge  Neck   Neck and thyroid: Normal, supple, trachea midline, no thyromegaly  Pulmonary   Respiratory effort: No increased work of breathing or signs of respiratory distress  Auscultation of lungs: Clear to auscultation, no rales, no rhonchi, no wheezing, good air movement  Cardiovascular   Auscultation of heart: Normal rate and rhythm, normal S1 and S2, no murmurs  Carotid pulses: Normal, 2+ bilaterally  Peripheral vascular exam: Normal pulses throughout, no tenderness, erythema or swelling  Pedal pulses: Normal, 2+ bilaterally      Examination of extremities for edema and/or varicosities: Normal     Abdomen   Abdomen: Non-tender and no distention  Liver and spleen: No hepatomegaly or splenomegaly  Musculoskeletal Gait and station: Normal gait  -- Digits and nails: Normal without clubbing or cyanosis  -- Inspection/palpation of joints, bones, and muscles: Normal, ROM normal     Skin - Skin and subcutaneous tissue: Normal without rashes or lesions  Skin is warm and well perfused, normal turgor  Neurologic - Cranial nerves: II - XII intact  -- Speech: Normal     Psychiatric - Orientation to person, place, and time: Normal -- Mood and affect: Normal       Future Appointments    Date/Time Provider Specialty Site   11/10/2017 09:30 AM Regina Stovall DO [facility]   10/25/2017 08:45 AM Bj Benjamin DO Pain Management [facility] OUTPATIENT   11/22/2017 09:00 AM [employer], LOPEZ Pain Management [facility] SPINE     Signatures   Electronically signed by : Eda Jimenez DO; Oct 18 2017 10:10AM EST                       (Author)

## 2017-10-20 ENCOUNTER — GENERIC CONVERSION - ENCOUNTER (OUTPATIENT)
Dept: OTHER | Facility: OTHER | Age: 47
End: 2017-10-20

## 2017-10-20 DIAGNOSIS — M50.90 CERVICAL DISC DISORDER: ICD-10-CM

## 2017-10-20 DIAGNOSIS — M54.12 RADICULOPATHY OF CERVICAL REGION: ICD-10-CM

## 2017-10-22 NOTE — PROGRESS NOTES
Assessment  1  Antiphospholipid antibody syndrome (289 81) (D68 61)   2  Anxiety (300 00) (F41 9)   3  Oscillopsia (368 15) (H53 19)   4  Hemiparesthesia (782 0) (R20 2)   5  Memory loss (780 93) (R41 3)   6  Abnormal EEG (794 02) (R94 01)    Plan  Abnormal EEG    · 1 - Jamari SEYMOUR, Lorelei Smith (Neurology) Co-Management  *  Status: Active  Requested for:  25NND7155   Ordered; For: Abnormal EEG; Ordered By: Diego Burgess Performed:  Due: 47TOY3797; Last Updated By: Luiza Jaffe; 10/17/2017 12:05:05 PM  Care Summary provided  : Yes    Discussion/Summary    Mr Janice Botello has presented to [address] for follow up on his abnormal brain MRI and cognitive impairment  He had EEG completed in July 2016 recommended by other providers for transient events of altered awareness, with sense of smell changes, as per the patient  No overt seizures has been noted  Patient had continuous Video EEG with bilateral left greater then right focal slowing  Patient was also noted  to have left temporal sharp transients of unknown clinical significance - non-concerning, but patient requesting follow up with epilepsy team to complete his evaluation  Patient was previously on oxcarbazepine 600 mg bid in April 2016  His abnormal EEG was felt to be non-epileptogenic and at this point suspicious for clinical seizure is less likely but not completely excluded  We again, discussed findings of patients MRI brain, cervical and thoracic spine - no signs of multiple sclerosis at this point  Patient has single white matter  white matter in right frontal lobe of unknown significance- no need for LP considering his radiographic findings  Patient will follow with ophthalmology team for oscillopsia  Considering ongoing cognitive dysfunction - patient findings are likely multifactorial, including multiple concussions and underlying mental health issues   Patient was recommended to follow with cognitive therapy, but he was not interested at the moment  Weekly psychotherapy was recommended by Dr Rukhsana Villeda team after formal neuropsychologic evaluation  No new focal neurologic deficit noted  Counseling Documentation With Imm: Greater than 50% of the 40 minutes evaluation was a face-to-face discussion regarding  the pathophysiology of his current symptoms and further plan, as well as counseling, educating, and coordinating the patientâs care  Chief Complaint  Chief Complaint Free Text Note Form: Patient is here today for a follow up for his balance disorder      History of Present Illness  Mr Janice Botello has a history of hypertension,atrial fibrillation, lupus anticoagulant, cryoglobulinemia With poorly defined autoimmune disorder, Polycystic kidney disease, Anxiety, multiple head injuries with concussion and prolonged Loss of consciousness,Lumbar degenerative joint disease, biceps and tandem tears in bilateral shoulders in bilateral knees surgical repair, temporal lobe epilepsy, mild cognitive impairment, Who presented for follow up of his abnormal brain MRI and right-sided weakness and numbness  Since last office visit patient described no new focal neurological deficit  Patient has been describing balance issues, eyes jumping all the time, changes in his smell  He was recently noted to have mild inflammatory process in MCP/PIP and he was referred to Rheumatology team for evaluation  He continues describing events that he believes flare ups with right-sided sensory disturbances when he is tripping and having poor dexterity in his hands  During those events he has blurred vision all of a sudden, with no complete vision loss or amaurosis fugax like picture described  Patient concerned of his smell deficiency has not changed  Patient sees multiple medical care providers for multiple issues      He has previously completed MRI of the cervical and thoracic spine with no demyelination noted-who radiographic features of his brain MRI along with clinical presentation does not meet criteria for multiple sclerosis or related issues  Patient has completed EMG study in May 2017 with no focal or radiculopathy peripheral neuropathy or upper motor neuron signs has been appreciated considering complain of right-sided weakness  Review of Systems  Neurological ROS:   Constitutional: recent weight loss  HEENT: blurred vision-- and-- dysphagia  Cardiovascular: palpitations present -- and-- rapid or irregular heart rate  Respiratory:  no unusual or persistant cough, no shortness of breath with or without exertion  Gastrointestinal: nausea,-- diarrhea,-- abdominal pain-- and-- changes in bowel habits  Genitourinary:  no incontinence, no feelings of urinary urgency, no increase in frequency, no urinary hesitancy, no dysuria, no hematuria  Musculoskeletal: arthralgias,-- head/neck/back pain-- and-- both  Integumentary  no masses, no rash, no skin lesions, no livedo reticularis  Psychiatric: anxiety,-- depression-- and-- mood swings  Endocrine  no unusual weight loss or gain, no excessive urination, no excessive thirst, no hair loss or gain, no hot or cold intolerance, no menstrual period change or irregularity, no loss of sexual ability or drive, no erection difficulty, no nipple discharge  Hematologic/Lymphatic:  no unusual bleeding, no tendency for easy bruising, no clotting skin or lumps  Neurological General: headache  Neurological Mental Status: memory problems  Neurological Cranial Nerves: blurry or double vision,-- taste or smell loss/changes-- and-- vertigo or dizziness  Neurological Motor findings include: tremor  Neurological Coordination: balance difficulties-- and-- clumsiness  Neurological Sensory: numbness-- and-- tingling  Neurological Gait: has had falls  ROS Reviewed:   ROS reviewed  Active Problems   1  Acne vulgaris (706 1) (L70 0)   2  Antiphospholipid antibody syndrome (062 81) (D61 61)   3   Anxiety (300 00) (F41 9)   4  Balance disorder (781 99) (R26 89)   5  Bilateral hip pain (719 45) (M25 551,M25 552)   6  Cervical disc disease (722 91) (M50 90)   7  Cervical neuritis (723 4) (M54 12)   8  Cervical radiculitis (723 4) (M54 12)   9  Chronic daily headache (784 0) (R51)   10  Chronic kidney disease, stage 3 (585 3) (N18 3)   11  Chronic nausea (787 02) (R11 0)   12  Esophageal reflux (530 81) (K21 9)   13  Gross hematuria (599 71) (R31 0)   14  Hand pain (729 5) (M79 643)   15  Hemiparesthesia (782 0) (R20 2)   16  Hypertension (401 9) (I10)   17  Hyponatremia (276 1) (E87 1)   18  Inflammatory arthropathy (714 9) (M19 90)   19  Irritable bowel syndrome with diarrhea (564 1) (K58 0)   20  Kidney cysts (753 10) (N28 1)   21  Left nephrolithiasis (592 0) (N20 0)   22  Lumbar radiculopathy (724 4) (M54 16)   23  Lupus anticoagulant positive (795 79) (R76 0)   24  Memory loss (780 93) (R41 3)   25  Nausea (787 02) (R11 0)   26  Nephrolithiasis (592 0) (N20 0)   27  Neurocognitive deficits (781 99) (R29 818,R41 89)   28  Oscillopsia (368 15) (H53 19)   29  Osteoarthritis of both knees (715 96) (M17 0)   30  Polycystic kidney (753 12) (Q61 3)   31  Pulmonary nodules (793 19) (R91 8)   32  Transient right leg weakness (729 89) (R29 898)   33  Ulnar neuritis, right (723 4) (G56 21)   34  White matter abnormality on MRI of brain (793 0) (R93 0)    Atrial fibrillation (427 31) (I48 91)       Benign essential hypertension (401 1) (I10)          Past Medical History  1  History of Closed Fracture Of The Dorsal Cortical Left Triquetral Bone (814 03)   2  History of Closed Fracture Of The Left Triquetral Bone (814 03)   3  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)   4  History of Erectile dysfunction of non-organic origin (302 72) (F52 21)   5  History of Esophagitis, reflux (530 11) (K21 0)   6  History of arthritis (V13 4) (Z87 39)   7  History of head injury (V15 59) (Z87 828)   8   History of hypertension (V12 59) (Z86 79)   9  History of kidney disease (V13 09) (Z87 448)   10  History of Joint Pain In Both Knees (719 46)   11  History of Left Trapezius Muscle Strain (840 8)   12  History of Light Colored Bowel Movement (Acholic Stools) (862 0)  Active Problems And Past Medical History Reviewed: The active problems and past medical history were reviewed and updated today  Surgical History  1  History of Hernia Repair   2  History of Knee Arthroscopy (Therapeutic)   3  History of Shoulder Surgery  Surgical History Reviewed: The surgical history was reviewed and updated today  Family History  Mother    1  Family history of Blood pressure instability   2  Family history of migraine headaches (V17 2) (Z82 0)   3  Family history of transient ischemic attacks (V17 1) (Z82 3)  Father    4  Family history of Paternity Unknown  Maternal Grandmother    5  Family history of Alzheimer's disease (V17 2) (Z82 0)   6  Family history of rheumatoid arthritis (V17 7) (Z82 61)  Maternal Grandfather    7  Family history of cerebrovascular accident (CVA) (V17 1) (Z82 3)   8  Family history of stroke (V17 1) (Z82 3)   9  Family history of transient ischemic attacks (V17 1) (Z82 3)  Family History    10  Family history of Arthritis (V17 7)   11  Family history of hypertension (V17 49) (Z82 49)   12  Family history of Osteoporosis (V17 81)  Family History Reviewed: The family history was reviewed and updated today  Social History   · Being A Social Drinker   · Caffeine Use   · Completed 12th grade   · Current every day smoker (305 1) (F17 200)   · Employed   · History of marijuana use (305 23) (Z87 898)   ·   Social History Reviewed: The social history was reviewed and updated today  The social history was reviewed and is unchanged  Current Meds   1  Aspirin 81 MG TABS; TAKE 1 TABLET DAILY; Therapy: 55BPM4773 to (Evaluate:13Mar2016); Last Rx:54Qzs7743 Ordered   2   Carvedilol 12 5 MG Oral Tablet; two tables twice daily; Therapy: 61ACA1619 to  Requested for: 14Apr2017 Recorded   3  Chlorthalidone 25 MG Oral Tablet; take 1 capsule by mouth daily; Therapy: 01Sep2017 to (Evaluate:74Jjw0597)  Requested for: 01Sep2017; Last   Rx:01Sep2017 Ordered   4  NIFEdipine ER 30 MG Oral Tablet Extended Release 24 Hour; Take 1 tablet twice daily; Therapy: 23DAE3089 to (Melanie Gregorio)  Requested for: 26YRU0130; Last   Rx:27Sep2017 Ordered   5  Potassium Chloride ER 10 MEQ Oral Tablet Extended Release; TAKE 1 TABLET DAILY; Therapy: 72PRS9151 to (LAGSOGDK:77JVK2672)  Requested for: 69AII0030; Last   Rx:27Sep2017 Ordered   6  Xanax XR 1 MG Oral Tablet Extended Release 24 Hour; One po hs; Therapy: 69TYD3015 to Recorded  Medication List Reviewed: The medication list was reviewed and updated today  Allergies  1  Codeine Sulfate TABS    Vitals  Signs   Recorded: 43YDR3729 11:24AM   Heart Rate: 81  Respiration: 18  Systolic: 578  Diastolic: 86  Height: 5 ft 8 in  Weight: 137 lb 3 oz  BMI Calculated: 20 86  BSA Calculated: 1 74  O2 Saturation: 98    Physical Exam  CONSTITUTIONAL: NAD, pleasant  NECK: supple, no lymphadenopathy, no thyromegaly, no JVD  CARDIOVASCULAR: RRR, normal S1S2, no murmurs, no rubs  RESP: clear to auscultation bilaterally, no wheezes/rhonchi/rales  ABDOMEN: soft, non tender, non distended  SKIN: no rash or skin lesions  EXTREMITIES: no edema, pulses 2+bilaterally  PSYCH: appropriate mood and affect  NEUROLOGIC COMPREHENSIVE EXAM: Patient is oriented to person, place and time, NAD; appropriate affect  CN II, III, IV, V, VI, VII,VIII,IX,X,XI-XII intact with EOMI, PERRLA, OKN intact, VF grossly intact, fundi poorly visualized secondary to pupillary constriction; symmetric face noted  Motor: 5/5 UE/LE bilateral symmetric; Sensory: intact to light touch and pinprick bilaterally; normal vibration sensation feet bilaterally;  Coordination within normal limits on FTN and BRENDA testing; DTR: 2/4 through, no Babinski, no clonus  Tandem gait is intact  Romberg: negative             Future Appointments    Date/Time Provider Specialty Site   11/10/2017 09:30 AM Donte Piper DO [facility]   11/22/2017 09:00 AM LOPEZ [facility] [address]     Signatures   Electronically signed by : WINSTON Mueller ; Oct 21 2017  5:04PM EST                       (Author)

## 2017-10-25 NOTE — PROGRESS NOTES
Assessment  1  Chronic kidney disease, stage 3 (585 3) (N18 3)   2  Polycystic kidney (753 12) (Q61 3)   3  Nephrolithiasis (592 0) (N20 0)   4  Antiphospholipid antibody syndrome (289 81) (D68 61)   5  Benign essential hypertension (401 1) (I10)    Plan  Chronic kidney disease, stage 3    · Chlorthalidone 25 MG Oral Tablet; take 1 capsule by mouth daily   Rx By: Onur Brooks; Dispense: 90 Days ; #:90 Tablet; Refill: 3;For: Chronic kidney disease, stage 3; ROMELIA = N; Sent To: [address] [place] [address]   · (1) [place]; Status:Active; Requested for:54Ubv2816;    Perform:[facility]; Due:16Ivq2175; Ordered; For:Chronic kidney disease, stage 3; Ordered By:Cody Graham;   · (1) CBC/ PLT (NO DIFF); Status:Active; Requested SRU:28HKG5313;    Perform:[facility]; ESTRADA:52CJX1876; Ordered; For:Chronic kidney disease, stage 3; Ordered By:Cody Graham;   · (1) MICROALBUMIN CREATININE RATIO, RANDOM URINE; Status:Active; Requested  MDP:69FQF5540;    Perform:[facility]; WXL:73BTL1447; Ordered; For:Chronic kidney disease, stage 3; Ordered By:Cody Graham;   · (1) RENAL FUNCTION PANEL; Status:Active; Requested XEX:11XZG6918;    Perform:[facility]; OQL:91UYJ7437; Ordered; For:Chronic kidney disease, stage 3; Ordered By:Cody Graham;   · (1) URIC ACID; Status:Active; Requested DWC:11OEI2541;    Perform:[facility]; UKV:23LAF1858; Ordered; For:Chronic kidney disease, stage 3; Ordered By:Cody Graham;   · Follow-up visit in 2 months Evaluation and Treatment  Follow-up  Status: Hold For -  Scheduling  Requested for: 00CYK1886   Ordered; For: Chronic kidney disease, stage 3; Ordered By: Onur Brooks Performed:  Due: 16IZU0543    Discussion/Summary    Chronic kidney disease, baseline creatinine between 1 1 and 1 3, his underlying disease is most likely from polycystic kidney disease  though his blood pressure is not under good control   Recent Doppler showed no evidence of renal artery stenosis  At this point time would like to add a diuretic chlorthalidone 25 mg a day  This will hopefully help with blood pressure also with his potassium levels  If his blood pressure still not optimally controlled we may have to add back the lisinopril but should be allowable given the above diuretic  Additionally we may see some worsening of his underlying disease given better blood pressure control but hopefully this will be transient and plateau fairly readily  syndrome, follow-up with hematology, smoking cessation discussed  kidney disease, no family historynegative for aneurysmevidence of renal massesDoppler showed no evidence of renal artery stenosis  The patient was counseled regarding instructions for management,-- impressions  Reason For Visit  Chronic kidney disease, polycystic kidney disease, accelerated hypertension      History of Present Illness  With the pleasure of seeing Ariana Kitchen for nephrology follow-up secondary to the above  Unfortunately recently his blood pressures been initially elevated with home readings as high as 198/164  At times he complains of visual changes  He denies any significant chest pain or shortness of breath he's had some lower extremity swelling  renal Doppler was negative for renal artery stenosis  ACE inhibitor were discontinued due to worsening renal function as well as hyperkalemia, currently on [facility] dialysis as well as amlodipine  Review of Systems    Constitutional: no fever-- and-- no fatigue  Integumentary: no rashes  Gastrointestinal: no nausea,-- no diarrhea-- and-- no vomiting  Respiratory: no shortness of breath  Cardiovascular: lower extremity edema, but-- no chest pain  Musculoskeletal: joint pain  Neurological: dizziness, but-- no lightheadedness  Genitourinary: hematuria, but-- no dysuria  Current Meds   1  AmLODIPine Besylate 10 MG Oral Tablet; TAKE 1 TABLET DAILY FOR BLOOD   PRESSURE;    Therapy: 44Nhr7460 to (Evaluate:78Nrg3256)  Requested for: 23Aug2017; Last   Rx:65Qey8777 Ordered   2  Aspirin 81 MG TABS; TAKE 1 TABLET DAILY; Therapy: 27XPK0384 to (Evaluate:13Mar2016); Last Rx:30Uqp9767 Ordered   3  Carvedilol 12 5 MG Oral Tablet; two tables twice daily; Therapy: 54PXF8074 to  Requested for: 14Apr2017 Recorded   4  Xanax XR 1 MG Oral Tablet Extended Release 24 Hour; One po hs; Therapy: 71YXT7768 to Recorded    The medication list was reviewed and updated today  Allergies  1  Codeine Sulfate TABS    Vitals  Vital Signs    Recorded: 01Sep2017 04:20PM Recorded: 01Sep2017 03:38PM   Heart Rate  76   Systolic 562 922, Sitting   Diastolic 689 490, Sitting   Height  5 ft 8 in   Weight  137 lb    BMI Calculated  20 83   BSA Calculated  1 74     Physical Exam    Constitutional: General appearance: No acute distress, well appearing and well nourished  Eyes: Anicteric sclerae  JVD:  No JVD present  Pulmonary: Respiratory effort: No increased work of breathing or signs of respiratory distress  -- Auscultation of lungs: Clear to auscultation  Cardiovascular: Auscultation of heart: Normal rate and rhythm, normal S1 and S2, without murmurs  Abdomen: Non-tender, no masses  Extremities: Extremities are abnormal   Extremities: bilateral extremities have trace pitting edema  Rash: No rash present  Neurologic: Non Focal      Psychiatric: Orientation to person, place, and time: Normal  -- and-- Mood and affect: Normal        Results/Data  (1) CORTISOL AM SPECIMEN 62Pfj8163 08:34AM Donald Salehbryan     Test Name Result Flag Reference   CORTISO AM SPEC 17 0 ug/dL  4 2-22 4   Reference ranges established for specimens drawn between 7 and 9 am  Results may be inaccurate if timing is not correct       (1) BASIC METABOLIC PROFILE 36ELP7961 04:09PM Courtney Handing Order Number: [id number]     Test Name Result Flag Reference   SODIUM 138 mmol/L  136-145   POTASSIUM 4 8 mmol/L  3 5-5 3   CHLORIDE 104 mmol/L  100-108   CARBON DIOXIDE 28 mmol/L  21-32   ANION GAP (CALC) 6 mmol/L  4-13   BLOOD UREA NITROGEN 19 mg/dL  5-25   CREATININE 1 31 mg/dL H 0 60-1 30   Standardized to IDMS reference method   CALCIUM 9 9 mg/dL  8 3-10 1   eGFR 64 ml/min/1 73sq m     [facility] Disease Education Program recommendations are as follows:  GFR calculation is accurate only with a steady state creatinine  Chronic Kidney disease less than 60 ml/min/1 73 sq  meters  Kidney failure less than 15 ml/min/1 73 sq  meters  GLUCOSE FASTING 112 mg/dL H 65-99   Specimen collection should occur prior to Sulfasalazine administration due to the potential for falsely depressed results  Specimen collection should occur prior to Sulfapyridine administration due to the potential for falsely elevated results  VAS RENAL ARTERY COMPLETE BILATERAL 55Lwz2817 04:02PM Deni Almazan Order Number: [id number]    - Patient Instructions: To schedule this appointment, please contact Central Scheduling at [phone]  Test Name Result Flag Reference   VAS RENAL ARTERY COMPLETE BILATERAL (Report)     [facility] REPORT   CLINICAL:   Indications: Benign Essential Hypertension [I10]  Patient here for essential   hypertension  H/o polycystic kidney disease  Risk Factors   The patient has history of hypertension and renal disease  Clinical   Right Pressure: 170/113 mm Hg, Left Pressure: 183/100 mm Hg  FINDINGS:      Unilateral    PSV (cm/s) EDV (cm/s)  RI    Sup Renal Aorta     79     19 0 76    Celiac         127     33       Prox  SMA        129     25       Mid   SMA        155     23          Right     Impression PSV (cm/s) EDV (cm/s)  RAR  RI Kidney (cm)    Ostial Renal Normal       105     28                 Prox Renal            141     57 1 78              Mid Renal             88     34 1 10              Dist Renal            50     19 1 00              Kidney                               10 34    Hilum 1 18      8    0 52           Mid Pole             25     11    0 57           H[place]um 3              21      9    0 57              Left     Impression PSV (cm/s) EDV (cm/s)  RAR  RI Kidney (cm)    Ostial Renal Normal       47     21                 Prox Renal            73     30 0 92              Mid Renal            116     48 1 46              Dist Renal            105     49 1 32              Kidney                               11 05    Hilum 1              19     11    0 44           Mid Pole             29     13    0 57           Hilum 3              20     10    0 50                    CONCLUSION:      Impression   The abdominal aorta is widely patent and normal caliber  RIGHT RENAL:   No evidence of significant arterial occlusive disease in the main renal artery  Patent renal vein   Adequate parenchymal flow noted with a renovascular resistive index of 0 57  Renal/Aorta Ratio: 1 78  The Kidney measures 10 34cm  LEFT RENAL:   No evidence of significant arterial occlusive disease in the main renal artery  Patent renal vein   Adequate parenchymal flow noted with a renovascular resistive index of 0 57  Renal/Aorta Ratio: 1 46  The Kidney measures 11 05cm  MESENTERIC:   Celiac and superior mesenteric arteries are patent        SIGNATURE:   Electronically Signed by: Kyle Perez on 2017-08-26 07:54:38 AM     (1) BASIC METABOLIC PROFILE 09BCG2599 12:33PM Tori Montana     Test Name Result Flag Reference   SODIUM 139 mmol/L  136-145   POTASSIUM 5 3 mmol/L  3 5-5 3   CHLORIDE 104 mmol/L  100-108   CARBON DIOXIDE 28 mmol/L  21-32   ANION GAP (CALC) 7 mmol/L  4-13   BLOOD UREA NITROGEN 17 mg/dL  5-25   CREATININE 1 17 mg/dL  0 60-1 30   Standardized to IDMS reference method   CALCIUM 9 5 mg/dL  8 3-10 1   eGFR 74 ml/min/1 73sq m     National Kidney Disease Education Program recommendations are as follows:  GFR calculation is accurate only with a steady state creatinine  Chronic Kidney disease less than 60 ml/min/1 73 sq  meters  Kidney failure less than 15 ml/min/1 73 sq  meters  GLUCOSE FASTING 86 mg/dL  65-99   Specimen collection should occur prior to Sulfasalazine administration due to the potential for falsely depressed results  Specimen collection should occur prior to Sulfapyridine administration due to the potential for falsely elevated results  (1) ALDOSTERONE, BLOOD 62Saf4476 12:33PM Thamas Ferraris Order Number: [id number]     Test Name Result Flag Reference   ALDOSTER, BLOOD 7 1 ng/dL  0 0 - 30 0   This test was developed and its performance characteristics  determined by [facility]  It has not been cleared or approved  by the Food and Drug Administration  Performed at:  [address]  [id number]  : Analia Montoya MD, Phone:  [phone]     (1) RENIN ACTIVITY 63Jyj9697 12:33PM Thamas Ferraris Order Number: [id number]     Test Name Result Flag Reference   RENIN 4 497 ng/mL/hr  0 167 - 5 380   This test was developed and its performance characteristics  determined by [facility]  It has not been cleared or approved  by the Food and Drug Administration  Performed at:  [address]  [id number]  : Analia Montoya MD, Phone:  [phone]     (1) TSH 41Mwh2927 12:33PM Teresa West     Test Name Result Flag Reference   TSH 1 050 uIU/mL  0 358-3 740   Patients undergoing fluorescein dye angiography may retain small amounts of fluorescein in the body for 48-72 hours post procedure  Samples containing fluorescein can produce falsely depressed TSH values  If the patient had this procedure,a specimen should be resubmitted post fluorescein clearance       (1) METANEPHRINE, PLASMA [id number] 12:33PM Thamas Ferraris Order Number: [id number]     Test Name Result Flag Reference   METANEPHRINE, PLASMA 47 pg/mL  0 - 62   Concentrations of Normetanephrine between 146 and 487 pg/mL, and  Metanephrine between 63 and 255 pg/mL are considered indeterminate  Follow-up biochemical testing is recommended when patient levels fall  within this indeterminate range  These tests include repeat testing of  plasma/urinary fractionated metanephrines and plasma catecholamines  Performed at:  [address]  [id number]  : Jewel Colbert MD, Phone:  [phone]   NORMETANEPHRINE, PLASMA 83 pg/mL  0 - 145       Future Appointments    Date/Time Provider Specialty Site   09/21/2017 10:45 AM Tim Pierce DO Pain Management [facility]   09/19/2017 01:20 PM Savanna Hernandez MD Gastroenterology [facility] 9   10/18/2017 09:20 AM Tai Mackey DO Cardiology  CARDIOLOGY  [place]   10/17/2017 11:00 AM WINSTON Ramos   Neurology [address] [place] [address]     Signatures   Electronically signed by : Ioana Gramajo DO; Sep  1 2017  4:20PM EST                       (Author)

## 2017-11-01 DIAGNOSIS — N18.30 CHRONIC KIDNEY DISEASE, STAGE III (MODERATE) (HCC): ICD-10-CM

## 2017-11-01 DIAGNOSIS — N18.9 CHRONIC KIDNEY DISEASE: ICD-10-CM

## 2017-11-07 ENCOUNTER — APPOINTMENT (OUTPATIENT)
Dept: LAB | Facility: MEDICAL CENTER | Age: 47
End: 2017-11-07
Payer: COMMERCIAL

## 2017-11-07 ENCOUNTER — TRANSCRIBE ORDERS (OUTPATIENT)
Dept: ADMINISTRATIVE | Facility: HOSPITAL | Age: 47
End: 2017-11-07

## 2017-11-07 DIAGNOSIS — M06.09 RHEUMATOID ARTHRITIS OF MULTIPLE SITES WITHOUT RHEUMATOID FACTOR (HCC): ICD-10-CM

## 2017-11-07 DIAGNOSIS — M25.50 PAIN IN JOINT, MULTIPLE SITES: ICD-10-CM

## 2017-11-07 DIAGNOSIS — D68.61 ANTIPHOSPHOLIPID SYNDROME (HCC): ICD-10-CM

## 2017-11-07 DIAGNOSIS — I13.10 BENIGN HYPERTENSIVE HEART AND RENAL DISEASE: ICD-10-CM

## 2017-11-07 DIAGNOSIS — I48.0 PAROXYSMAL ATRIAL FIBRILLATION (HCC): ICD-10-CM

## 2017-11-07 DIAGNOSIS — N18.30 CHRONIC KIDNEY DISEASE, STAGE III (MODERATE) (HCC): ICD-10-CM

## 2017-11-07 DIAGNOSIS — E55.9 AVITAMINOSIS D: ICD-10-CM

## 2017-11-07 DIAGNOSIS — M06.09 RHEUMATOID ARTHRITIS OF MULTIPLE SITES WITHOUT RHEUMATOID FACTOR (HCC): Primary | ICD-10-CM

## 2017-11-07 LAB
25(OH)D3 SERPL-MCNC: 33.1 NG/ML (ref 30–100)
ALBUMIN SERPL BCP-MCNC: 4.5 G/DL (ref 3.5–5)
ALP SERPL-CCNC: 87 U/L (ref 46–116)
ALT SERPL W P-5'-P-CCNC: 24 U/L (ref 12–78)
ANION GAP SERPL CALCULATED.3IONS-SCNC: 11 MMOL/L (ref 4–13)
AST SERPL W P-5'-P-CCNC: 22 U/L (ref 5–45)
BACTERIA UR QL AUTO: ABNORMAL /HPF
BASOPHILS # BLD AUTO: 0.02 THOUSANDS/ΜL (ref 0–0.1)
BASOPHILS NFR BLD AUTO: 0 % (ref 0–1)
BILIRUB SERPL-MCNC: 1.05 MG/DL (ref 0.2–1)
BILIRUB UR QL STRIP: NEGATIVE
BUN SERPL-MCNC: 20 MG/DL (ref 5–25)
C3 SERPL-MCNC: 132 MG/DL (ref 90–180)
C4 SERPL-MCNC: 32 MG/DL (ref 10–40)
CALCIUM SERPL-MCNC: 9.7 MG/DL (ref 8.3–10.1)
CHLORIDE SERPL-SCNC: 94 MMOL/L (ref 100–108)
CK SERPL-CCNC: 86 U/L (ref 39–308)
CLARITY UR: CLEAR
CO2 SERPL-SCNC: 31 MMOL/L (ref 21–32)
COLOR UR: YELLOW
CREAT SERPL-MCNC: 1.14 MG/DL (ref 0.6–1.3)
CRP SERPL QL: <3 MG/L
EOSINOPHIL # BLD AUTO: 0.1 THOUSAND/ΜL (ref 0–0.61)
EOSINOPHIL NFR BLD AUTO: 2 % (ref 0–6)
ERYTHROCYTE [DISTWIDTH] IN BLOOD BY AUTOMATED COUNT: 12.4 % (ref 11.6–15.1)
ERYTHROCYTE [SEDIMENTATION RATE] IN BLOOD: 13 MM/HOUR (ref 0–10)
GFR SERPL CREATININE-BSD FRML MDRD: 76 ML/MIN/1.73SQ M
GLUCOSE P FAST SERPL-MCNC: 97 MG/DL (ref 65–99)
GLUCOSE UR STRIP-MCNC: NEGATIVE MG/DL
HCT VFR BLD AUTO: 47.4 % (ref 36.5–49.3)
HGB BLD-MCNC: 17.5 G/DL (ref 12–17)
HGB UR QL STRIP.AUTO: ABNORMAL
HYALINE CASTS #/AREA URNS LPF: ABNORMAL /LPF
KETONES UR STRIP-MCNC: NEGATIVE MG/DL
LEUKOCYTE ESTERASE UR QL STRIP: NEGATIVE
LYMPHOCYTES # BLD AUTO: 1.54 THOUSANDS/ΜL (ref 0.6–4.47)
LYMPHOCYTES NFR BLD AUTO: 30 % (ref 14–44)
MCH RBC QN AUTO: 33.5 PG (ref 26.8–34.3)
MCHC RBC AUTO-ENTMCNC: 36.9 G/DL (ref 31.4–37.4)
MCV RBC AUTO: 91 FL (ref 82–98)
MONOCYTES # BLD AUTO: 0.47 THOUSAND/ΜL (ref 0.17–1.22)
MONOCYTES NFR BLD AUTO: 9 % (ref 4–12)
NEUTROPHILS # BLD AUTO: 3.07 THOUSANDS/ΜL (ref 1.85–7.62)
NEUTS SEG NFR BLD AUTO: 59 % (ref 43–75)
NITRITE UR QL STRIP: NEGATIVE
NON-SQ EPI CELLS URNS QL MICRO: ABNORMAL /HPF
NRBC BLD AUTO-RTO: 0 /100 WBCS
PH UR STRIP.AUTO: 7.5 [PH] (ref 4.5–8)
PLATELET # BLD AUTO: 226 THOUSANDS/UL (ref 149–390)
PMV BLD AUTO: 11 FL (ref 8.9–12.7)
POTASSIUM SERPL-SCNC: 2.3 MMOL/L (ref 3.5–5.3)
PROT SERPL-MCNC: 8.8 G/DL (ref 6.4–8.2)
PROT UR STRIP-MCNC: NEGATIVE MG/DL
RBC # BLD AUTO: 5.22 MILLION/UL (ref 3.88–5.62)
RBC #/AREA URNS AUTO: ABNORMAL /HPF
SODIUM SERPL-SCNC: 136 MMOL/L (ref 136–145)
SP GR UR STRIP.AUTO: 1.01 (ref 1–1.03)
TSH SERPL DL<=0.05 MIU/L-ACNC: 1.12 UIU/ML (ref 0.36–3.74)
URATE SERPL-MCNC: 3.7 MG/DL (ref 4.2–8)
UROBILINOGEN UR QL STRIP.AUTO: 1 E.U./DL
WBC # BLD AUTO: 5.2 THOUSAND/UL (ref 4.31–10.16)
WBC #/AREA URNS AUTO: ABNORMAL /HPF

## 2017-11-07 PROCEDURE — 85732 THROMBOPLASTIN TIME PARTIAL: CPT

## 2017-11-07 PROCEDURE — 81374 HLA I TYPING 1 ANTIGEN LR: CPT

## 2017-11-07 PROCEDURE — 36415 COLL VENOUS BLD VENIPUNCTURE: CPT

## 2017-11-07 PROCEDURE — 85025 COMPLETE CBC W/AUTO DIFF WBC: CPT

## 2017-11-07 PROCEDURE — 80074 ACUTE HEPATITIS PANEL: CPT

## 2017-11-07 PROCEDURE — 86235 NUCLEAR ANTIGEN ANTIBODY: CPT

## 2017-11-07 PROCEDURE — 86147 CARDIOLIPIN ANTIBODY EA IG: CPT

## 2017-11-07 PROCEDURE — 86038 ANTINUCLEAR ANTIBODIES: CPT

## 2017-11-07 PROCEDURE — 86200 CCP ANTIBODY: CPT

## 2017-11-07 PROCEDURE — 82550 ASSAY OF CK (CPK): CPT

## 2017-11-07 PROCEDURE — 85670 THROMBIN TIME PLASMA: CPT

## 2017-11-07 PROCEDURE — 84165 PROTEIN E-PHORESIS SERUM: CPT

## 2017-11-07 PROCEDURE — 85613 RUSSELL VIPER VENOM DILUTED: CPT

## 2017-11-07 PROCEDURE — 87491 CHLMYD TRACH DNA AMP PROBE: CPT

## 2017-11-07 PROCEDURE — 84443 ASSAY THYROID STIM HORMONE: CPT

## 2017-11-07 PROCEDURE — 86747 PARVOVIRUS ANTIBODY: CPT

## 2017-11-07 PROCEDURE — 85705 THROMBOPLASTIN INHIBITION: CPT

## 2017-11-07 PROCEDURE — 86800 THYROGLOBULIN ANTIBODY: CPT

## 2017-11-07 PROCEDURE — 86160 COMPLEMENT ANTIGEN: CPT

## 2017-11-07 PROCEDURE — 86666 EHRLICHIA ANTIBODY: CPT

## 2017-11-07 PROCEDURE — 86753 PROTOZOA ANTIBODY NOS: CPT

## 2017-11-07 PROCEDURE — 86376 MICROSOMAL ANTIBODY EACH: CPT

## 2017-11-07 PROCEDURE — 87591 N.GONORRHOEAE DNA AMP PROB: CPT

## 2017-11-07 PROCEDURE — 80053 COMPREHEN METABOLIC PANEL: CPT

## 2017-11-07 PROCEDURE — 81001 URINALYSIS AUTO W/SCOPE: CPT | Performed by: INTERNAL MEDICINE

## 2017-11-07 PROCEDURE — 84432 ASSAY OF THYROGLOBULIN: CPT

## 2017-11-07 PROCEDURE — 86611 BARTONELLA ANTIBODY: CPT

## 2017-11-07 PROCEDURE — 82306 VITAMIN D 25 HYDROXY: CPT

## 2017-11-07 PROCEDURE — 86617 LYME DISEASE ANTIBODY: CPT

## 2017-11-07 PROCEDURE — 85303 CLOT INHIBIT PROT C ACTIVITY: CPT

## 2017-11-07 PROCEDURE — 86140 C-REACTIVE PROTEIN: CPT

## 2017-11-07 PROCEDURE — 86430 RHEUMATOID FACTOR TEST QUAL: CPT

## 2017-11-07 PROCEDURE — 84550 ASSAY OF BLOOD/URIC ACID: CPT

## 2017-11-07 PROCEDURE — 86225 DNA ANTIBODY NATIVE: CPT

## 2017-11-07 PROCEDURE — 83516 IMMUNOASSAY NONANTIBODY: CPT

## 2017-11-07 PROCEDURE — 85652 RBC SED RATE AUTOMATED: CPT

## 2017-11-07 PROCEDURE — 85306 CLOT INHIBIT PROT S FREE: CPT

## 2017-11-07 PROCEDURE — 86146 BETA-2 GLYCOPROTEIN ANTIBODY: CPT

## 2017-11-08 LAB
ALBUMIN SERPL ELPH-MCNC: 5.1 G/DL (ref 3.5–5)
ALBUMIN SERPL ELPH-MCNC: 60 % (ref 52–65)
ALPHA1 GLOB SERPL ELPH-MCNC: 0.38 G/DL (ref 0.1–0.4)
ALPHA1 GLOB SERPL ELPH-MCNC: 4.5 % (ref 2.5–5)
ALPHA2 GLOB SERPL ELPH-MCNC: 0.87 G/DL (ref 0.4–1.2)
ALPHA2 GLOB SERPL ELPH-MCNC: 10.2 % (ref 7–13)
B BURGDOR IGG PATRN SER IB-IMP: NEGATIVE
B BURGDOR IGM PATRN SER IB-IMP: NEGATIVE
B BURGDOR18KD IGG SER QL IB: ABNORMAL
B BURGDOR23KD IGG SER QL IB: ABNORMAL
B BURGDOR23KD IGM SER QL IB: ABNORMAL
B BURGDOR28KD IGG SER QL IB: ABNORMAL
B BURGDOR30KD IGG SER QL IB: ABNORMAL
B BURGDOR39KD IGG SER QL IB: ABNORMAL
B BURGDOR39KD IGM SER QL IB: ABNORMAL
B BURGDOR41KD IGG SER QL IB: PRESENT
B BURGDOR41KD IGM SER QL IB: ABNORMAL
B BURGDOR45KD IGG SER QL IB: PRESENT
B BURGDOR58KD IGG SER QL IB: ABNORMAL
B BURGDOR66KD IGG SER QL IB: ABNORMAL
B BURGDOR93KD IGG SER QL IB: ABNORMAL
B19V IGG SER IA-ACNC: 0.4 INDEX (ref 0–0.8)
B19V IGM SER IA-ACNC: 0.1 INDEX (ref 0–0.8)
BETA GLOB ABNORMAL SERPL ELPH-MCNC: 0.46 G/DL (ref 0.4–0.8)
BETA1 GLOB SERPL ELPH-MCNC: 5.4 % (ref 5–13)
BETA2 GLOB SERPL ELPH-MCNC: 5.5 % (ref 2–8)
BETA2+GAMMA GLOB SERPL ELPH-MCNC: 0.47 G/DL (ref 0.2–0.5)
CARDIOLIPIN IGA SER IA-ACNC: <9 APL U/ML (ref 0–11)
CARDIOLIPIN IGG SER IA-ACNC: <9 GPL U/ML (ref 0–14)
CARDIOLIPIN IGM SER IA-ACNC: <9 MPL U/ML (ref 0–12)
CENTROMERE B AB SER-ACNC: <0.2 AI (ref 0–0.9)
CHLAMYDIA DNA CVX QL NAA+PROBE: NORMAL
DSDNA AB SER-ACNC: 1 IU/ML (ref 0–9)
ENA RNP AB SER-ACNC: 0.2 AI (ref 0–0.9)
ENA SCL70 AB SER-ACNC: <0.2 AI (ref 0–0.9)
ENA SM AB SER-ACNC: <0.2 AI (ref 0–0.9)
ENA SS-A AB SER-ACNC: <0.2 AI (ref 0–0.9)
ENA SS-B AB SER-ACNC: <0.2 AI (ref 0–0.9)
GAMMA GLOB ABNORMAL SERPL ELPH-MCNC: 1.22 G/DL (ref 0.5–1.6)
GAMMA GLOB SERPL ELPH-MCNC: 14.4 % (ref 12–22)
HAV IGM SER QL: NORMAL
HBV CORE IGM SER QL: NORMAL
HBV SURFACE AG SER QL: NORMAL
HCV AB SER QL: NORMAL
IGG/ALB SER: 1.5 {RATIO} (ref 1.1–1.8)
N GONORRHOEA DNA GENITAL QL NAA+PROBE: NORMAL
PROT PATTERN SERPL ELPH-IMP: ABNORMAL
PROT SERPL-MCNC: 8.5 G/DL (ref 6.4–8.2)
RHEUMATOID FACT SER QL LA: NEGATIVE
RYE IGE QN: NEGATIVE
THYROGLOB AB SERPL-ACNC: <1 IU/ML (ref 0–0.9)
THYROGLOB SERPL-MCNC: 34.6 NG/ML (ref 1.4–29.2)
THYROPEROXIDASE AB SERPL-ACNC: 166 IU/ML (ref 0–34)
TTG IGA SER-ACNC: <2 U/ML (ref 0–3)
TTG IGG SER-ACNC: <2 U/ML (ref 0–5)

## 2017-11-09 LAB
A PHAGOCYTOPH IGG TITR SER IF: NEGATIVE {TITER}
A PHAGOCYTOPH IGM TITR SER IF: NEGATIVE {TITER}
APTT SCREEN TO CONFIRM RATIO: 1.19 RATIO (ref 0–1.4)
B HENSELAE IGG TITR SER IF: NEGATIVE TITER
B HENSELAE IGM TITR SER IF: NEGATIVE TITER
B MICROTI IGG TITR SER: NORMAL {TITER}
B MICROTI IGM TITR SER: NORMAL {TITER}
B QUINTANA IGG TITR SER IF: NEGATIVE TITER
B QUINTANA IGM TITR SER IF: NEGATIVE TITER
B2 GLYCOPROT1 IGA SER-ACNC: <9 GPI IGA UNITS (ref 0–25)
B2 GLYCOPROT1 IGG SER-ACNC: <9 GPI IGG UNITS (ref 0–20)
B2 GLYCOPROT1 IGM SER-ACNC: <9 GPI IGM UNITS (ref 0–32)
CCP IGA+IGG SERPL IA-ACNC: 4 UNITS (ref 0–19)
CONFIRM APTT/NORMAL: 47 SEC (ref 0–55)
DRVVT IMM 1:2 NP PPP: 44.4 SEC (ref 0–47)
E CHAFFEENSIS IGG TITR SER IF: NEGATIVE {TITER}
E CHAFFEENSIS IGM TITR SER IF: NEGATIVE {TITER}
LA PPP-IMP: ABNORMAL
PROT C AG ACT/NOR PPP IA: 137 % OF NORMAL (ref 60–150)
PROT S ACT/NOR PPP: 140 % (ref 63–140)
SCREEN APTT: 49 SEC (ref 0–51.9)
SCREEN DRVVT: 50.7 SEC (ref 0–47)
THROMBIN TIME: 17.3 SEC (ref 0–23)

## 2017-11-10 ENCOUNTER — ALLSCRIPTS OFFICE VISIT (OUTPATIENT)
Dept: OTHER | Facility: OTHER | Age: 47
End: 2017-11-10

## 2017-11-11 NOTE — PROGRESS NOTES
Assessment  1  Polycystic kidney (753 12) (Q61 3)   2  Nephrolithiasis (592 0) (N20 0)   3  Hypokalemia (276 8) (E87 6)   4  Benign essential hypertension (401 1) (I10)   5  CKD (chronic kidney disease) (585 9) (N18 9)    Plan  Chronic kidney disease, stage 3, CKD (chronic kidney disease)    · (1) BASIC METABOLIC PROFILE; Status:Active; Requested for:27Nov2017;    Perform:[facility]; IUH:66SCF5358; Ordered; For:Chronic kidney disease, stage 3, CKD (chronic kidney disease); Ordered By:Cody Graham;   · (1) MAGNESIUM; Status:Active; Requested for:27Nov2017;    Perform:[facility]; XRK:92QXA8387; Ordered;kidney disease, stage 3, CKD (chronic kidney disease); Ordered By:Cody Graham;   · (1) MICROALBUMIN CREATININE RATIO, RANDOM URINE; Status:Active; Requestedfor:05Feb2018; Perform:[facility]; WSV:43LWI2499;KOAGCPE; For:Chronic kidney disease, stage 3, CKD (chronic kidney disease); Ordered By:Cody Graham;   · (1) RENAL FUNCTION PANEL; Status:Active; Requested for:05Ywz1949; Perform:[facility]; FJY:29NNF9418;AVHZDJD;WNRQDL disease, stage 3, CKD (chronic kidney disease); Ordered By:Cody Graham;   · Follow-up visit in 3 months Evaluation and Treatment  Follow-up  Status: Hold For -Scheduling  Requested for: 86GOT6983   Ordered;Chronic kidney disease, stage 3, CKD (chronic kidney disease); Ordered By: Jc Cisneros Performed:  Due: 44NXE2231  Hypertension    · Potassium Chloride ER 10 MEQ Oral Tablet Extended Release; TAKE 1 TABLETTWICE DAILY   Rx By: Jc Cisneros; Dispense: 90 Days ; #:180 Tablet Extended Release; Refill: 3;Hypertension; ROMELIA = N; Verified Transmission to [address] [place] [address]; Last Updated By: [employer]; 11/10/2017 9:58:45 AM    Discussion/Summary    Polycystic kidney diseasefunction remains fairly stable, creatinine now 1 14 with an estimated GFR 76  He has no significant proteinuria    is under significantly better control I would continue with his chlorthalidone, Nifedipine as well as carvedilol  secondary to chlorthalidonehypokalemia is clearly probably related to his chlorthalidone  However his blood pressure with the addition of the chlorthalidone is significantly improved  Would like to try to manage this with increasing his potassium supplement twice daily as well as increasing his dietary potassium  My hope is to have improvement in serum potassium, will plan repeat chemistries in 2 weeks  However if his potassium is still not improved we may consider cutting this to half dose or even adding a potassium-sparing diuretic   assuming that his kidney function, electrolytes are improved we will plan on seeing him in approximately 3 months with repeat laboratory studies at that time  The patient was counseled regarding instructions for management,-- impressions  The patient has the current Goals: Continue blood pressure controlin serum potassiumstability in renal function  The patent has the current Barriers: None  Patient is able to Self-Care  Possible side effects of new medications were reviewed with the patient/guardian today  The treatment plan was reviewed with the patient/guardian  The patient/guardian understands and agrees with the treatment plan      Reason For Visit  elevated blood pressure, polycystic kidney disease      History of Present Illness  We had the pleasure of seeing Librado Almaraz 4 nephrology follow-up secondary to previous diagnosis of accelerated hypertension as well as POTS cystic kidney disease  Overall he has been feeling significantly better, blood pressures at home at times run in the 120s  Although he does still have spikes reportedly in the 170s to 190s  Today in the office his blood pressure was 120/80  Denies any headaches  He does complain of occasional dizziness but quickly resolves within seconds   Denies any significant chest pain, shortness of breath or lower extremity swelling still has been avoiding high potassium foods and now have given him a list of higher containing potassium foods to start given his hypokalemia  Review of Systems   Constitutional: no fever-- and-- no fatigue  Integumentary: no rashes  Gastrointestinal: no nausea,-- no diarrhea-- and-- no vomiting  Respiratory: no shortness of breath  Cardiovascular: no chest pain-- and-- no lower extremity edema  Neurological: lightheadedness, but-- as noted in HPI  Genitourinary: no dysuria-- and-- no hematuria  Current Meds   1  AmLODIPine Besylate 10 MG Oral Tablet; one po qd; Therapy: 65BAG3660 to Recorded   2  Aspirin 81 MG TABS; TAKE 1 TABLET DAILY; Therapy: 63EWC5755 to (Evaluate:13Mar2016); Last Rx:90Ivm6149 Ordered   3  Carvedilol 12 5 MG Oral Tablet; two tables twice daily; Therapy: 91ASX6712 to (Last Rx:73Nch8405)  Requested for: 40Vki8306 Ordered   4  Chlorthalidone 25 MG Oral Tablet; take 1 capsule by mouth daily; Therapy: 92Opt7865 to (Evaluate:63Dfb0573)  Requested for: 06Bkg4096; Last Rx:58Awv2358 Ordered   5  NIFEdipine ER 30 MG Oral Tablet Extended Release 24 Hour; Take 1 tablet twice daily; Therapy: 19CNE7568 to (Terri Lopez)  Requested for: 03APF3065; Last Rx:60Bnv3832 Ordered   6  Potassium Chloride ER 10 MEQ Oral Tablet Extended Release; TAKE 1 TABLET DAILY; Therapy: 08YVE1423 to (PUUPYFTK:45CEW7336)  Requested for: 19JUQ0618; Last Rx:80Izj8486 Ordered   7  Xanax XR 1 MG Oral Tablet Extended Release 24 Hour; One po hs; Therapy: 91LTE9402 to Recorded    The medication list was reviewed and updated today  Allergies  1  Codeine Sulfate TABS    Vitals  Vital Signs    Recorded: 33ZQV1873 09:29AM   Heart Rate 58   Respiration 16   Systolic 784, LUE, Sitting   Diastolic 88, LUE, Sitting   Height 5 ft 8 in   Weight 136 lb 2 08 oz   BMI Calculated 20 7   BSA Calculated 1 74       Physical Exam   Constitutional: General appearance: No acute distress, well appearing and well nourished       Eyes: Anicteric sclerae  JVD:  No JVD present  Pulmonary: Respiratory effort: No increased work of breathing or signs of respiratory distress  -- Auscultation of lungs: Clear to auscultation  Cardiovascular: Auscultation of heart: Normal rate and rhythm, normal S1 and S2, without murmurs  Abdomen: Non-tender, no masses  Extremities: No cyanosis, clubbing or edema  Rash: No rash present  Neurologic: Non Focal     Psychiatric: Orientation to person, place, and time: Normal  -- and-- Mood and affect: Normal        Results/Data  (1) CHLAMYDIA/GC AMPLIFIED DNA, PCR [id number] 12:54PM EPIC, Provider   Test ordered by: [facility] Name Result Flag Reference   CHLAMYDIA,AMPLIFIED DNA PROBE   C  trachomatis Amplified DNA Negative   C  trachomatis Amplified DNA Negative   For optimal microbe detection, urine samples should be a first catch specimen (20-60 ml of urine)  Patient should not have urinated for at least 1 hour prior to collection  A specimen not collected in this manner may have falsely negative results     N  GONORRHOEAE AMPLIFIED DNA   N  gonorrhoeae Amplified DNA Negative   N  gonorrhoeae Amplified DNA Negative     (1) ANTICARDIOLIPIN ANTIBODY [id number] 12:40PM EPIC, Provider   Test ordered by: [address] [place] [address]     Test Name Result Flag Reference   CARDLIP IGA AB <9 APL U/mL  0 - 11     Negative:              <12                           Indeterminate:     12 - 20                           Low-Med Positive: >20 - 80                           High Positive:         >80   CARDLIP IGG AB <9 GPL U/mL  0 - 14     Negative:              <15                           Indeterminate:     15 - 20                           Low-Med Positive: >20 - 80                           High Positive:         >80   CARDLIP IGM AB <9 MPL U/mL  0 - 12     Negative:              <13                           Indeterminate:     13 - 20                           Low-Med Positive: >20 - 80                           High Positive:         >80 Performed at:  [address]  [id number] : Maite Solares MD, Phone:  [phone]     (1) EHRLICHIA ANTIBODY PANEL [id number] 12:38PM [facility], Provider   Test ordered by: [facility] Name Result Flag Reference   E CHAFFEENSIS IGG AB Negative  Neg:<1:64   E CHAFFEENSIS IGM AB Negative  Neg:<1:20     IgG titers if 1:64 or greater indicate exposure or  acute and convalescent samples showing a four-fold increase, and/or the presence of IgM indicate recent or current infection  HGE IGG TITER Negative  Neg:<1:64     HGE IgG levels are detectable 7 to 10 days post infection and persist approximately one year  HGE IGM TITER Negative  Neg:<1:20     Due to a reagent backorder, this test was performed using a different assay  The reference interval for this alternate assay is:                                        Negative      <1:64                                        Positive       1:64 or greater IgM levels usually rise 3 to 5 days post infection and fall to normal levels in approximately 30 to 60 days  Performed at:  [address]  [id number] : Julianna Poe MD, Phone:  [phone]     (1) CBC/PLT/DIFF [id number] 10:53AM EPIC, Provider   Test ordered by: [facility] Name Result Flag Reference   WBC COUNT 5 20 Thousand/uL  4 31-10 16   RBC COUNT 5 22 Million/uL  3 88-5 62   HEMOGLOBIN 17 5 g/dL H 12 0-17 0   HEMATOCRIT 47 4 %  36 5-49 3   MCV 91 fL  82-98   MCH 33 5 pg  26 8-34 3   MCHC 36 9 g/dL  31 4-37 4   RDW 12 4 %  11 6-15 1   MPV 11 0 fL  8 9-12 7   PLATELET COUNT 832 Thousands/uL  149-390   nRBC AUTOMATED 0 /100 WBCs     NEUTROPHILS RELATIVE PERCENT 59 %  43-75   LYMPHOCYTES RELATIVE PERCENT 30 %  14-44   MONOCYTES RELATIVE PERCENT 9 %  4-12   EOSINOPHILS RELATIVE PERCENT 2 %  0-6   BASOPHILS RELATIVE PERCENT 0 %  0-1   NEUTROPHILS ABSOLUTE COUNT 3 07 Thousands/? ??L  1 85-7 62   LYMPHOCYTES ABSOLUTE COUNT 1 54 Thousands/? ??L  0 60-4 47   MONOCYTES ABSOLUTE COUNT 0 47 Thousand/? ??L  0 17-1 22   EOSINOPHILS ABSOLUTE COUNT 0 10 Thousand/? ??L  0 00-0 61   BASOPHILS ABSOLUTE COUNT 0 02 Thousands/? ??L  0 00-0 10   This is a patient instruction: This test is non-fasting  Please drink two glasses of water morning of bloodwork  (1) URINALYSIS (will reflex a microscopy if leukocytes, occult blood, protein or nitrites are not within normal limits) [id number] 10:53AM EP[facility], Provider   Test ordered by: [facility] Name Result Flag Reference   COLOR Yellow     CLARITY Clear     SPECIFIC GRAVITY UA 1 013  1 003-1 030   PH UA 7 5  4 5-8 0   LEUKOCYTE ESTERASE UA Negative  Negative   NITRITE UA Negative  Negative   PROTEIN UA Negative mg/dl  Negative   GLUCOSE UA Negative mg/dl  Negative   KETONES UA Negative mg/dl  Negative   UROBILINOGEN UA 1 0 E U /dl  0 2, 1 0 E U /dl   BILIRUBIN UA Negative  Negative   BLOOD UA Trace A Negative     (1) URINALYSIS (will reflex a microscopy if leukocytes, occult blood, protein or nitrites are not within normal limits) [id number] 10:53AM [facility], Provider   Test ordered by: [facility]     Test Name Result Flag Reference   BACTERIA None Seen /hpf  None Seen, Occasional   EPITHELIAL CELLS None Seen /hpf  None Seen, Occasional   HYALINE CASTS None Seen /lpf  None Seen   RBC UA 2-4 /hpf A None Seen, 0-5   WBC UA None Seen /hpf  None Seen, 0-5, 5-55, 5-65     (1) VITAMIN D 25-HYDROXY [id number] 10:53AM EP[facility], Provider   Test ordered by: [facility]     Test Name Result Flag Reference   VIT D 25-HYDROX 33 1 ng/mL  30 0-100 0     This assay is a certified procedure of the CDC Vitamin D Standardization Certification Program (VDSCP)   Deficiency <20ng/ml  Insufficiency 20-30ng/ml  Sufficient  ng/ml   *Patients undergoing fluorescein dye angiography may retain small amounts of fluorescein in the body for 48-72 hours post procedure   Samples containing fluorescein can produce falsely elevated Vitamin D values  If the patient had this procedure, a specimen should be resubmitted post fluorescein clearance  (1) SED RATE [id number] 10:53AM EP[facility], Provider   Test ordered by: [facility] Name Result Flag Reference   SED RATE 13 mm/hour H 0-10     (1) URIC ACID [id number] 10:53AM EPIC, Provider   Test ordered by: [employer]     Test Name Result Flag Reference   URIC ACID 3 7 mg/dL L 4 2-8 0   Specimen collection should occur prior to Metamizole administration due to the potential for falsely depressed results  (1) C-REACTIVE PROTEIN [id number] 10:53AM EPIC, Provider   Test ordered by: [employer]     Test Name Result Flag Reference   C-REACT PROTEIN <3 0 mg/L  <3 0     (1) C4 COMPLEMENT [id number] 10:53AM EPIC, Provider   Test ordered by: [employer]     Test Name Result Flag Reference   C4 COMPLEMENT 32 0 mg/dL  10 0-40 0     (1) C3 COMPLEMENT [id number] 10:53AM EPIC, Provider   Test ordered by: [facility] Name Result Flag Reference   C3 COMPLEMENT 132 0 mg/dL  90 0-180 0     (1) CK (CPK) [id number] 10:53AM EP[facility], Provider   Test ordered by: [facility] Name Result Flag Reference   CK (CPK) 86 U/L       (1) TSH [id number] 10:53AM [facility], Provider   Test ordered by: [facility] Name Result Flag Reference   TSH 1 120 uIU/mL  0 358-3 740     This is a patient instruction: This test is non-fasting  Please drink two glasses of water morning of bloodwork  Patients undergoing fluorescein dye angiography may retain small amounts of fluorescein in the body for 48-72 hours post procedure  Samples containing fluorescein can produce falsely depressed TSH values  If the patient had this procedure,a specimen should be resubmitted post fluorescein clearance       (1) COMPREHENSIVE METABOLIC PANEL [id number] 93:00HN EPIC, Provider   Test ordered by: [employer]     Test Name Result Flag Reference   SODIUM 136 mmol/L  136-145   POTASSIUM 2 3 mmol/L LL 3 5-5 3   CHLORIDE 94 mmol/L L 100-108   CARBON DIOXIDE 31 mmol/L  21-32   ANION GAP (CALC) 11 mmol/L  4-13   BLOOD UREA NITROGEN 20 mg/dL  5-25   CREATININE 1 14 mg/dL  0 60-1 30   Standardized to [facility] reference method   CALCIUM 9 7 mg/dL  8 3-10 1   BILI, TOTAL 1 05 mg/dL H 0 20-1 00   ALK PHOSPHATAS 87 U/L     ALT (SGPT) 24 U/L  12-78   Specimen collection should occur prior to Sulfasalazine and/or Sulfapyridine administration due to the potential for falsely depressed results  AST(SGOT) 22 U/L  5-45   Specimen collection should occur prior to Sulfasalazine administration due to the potential for falsely depressed results  ALBUMIN 4 5 g/dL  3 5-5 0   TOTAL PROTEIN 8 8 g/dL H 6 4-8 2   eGFR 76 ml/min/1 73sq m       National Kidney Disease Education Program recommendations are as follows: GFR calculation is accurate only with a steady state creatinine Chronic Kidney disease less than 60 ml/min/1 73 sq  meters Kidney failure less than 15 ml/min/1 73 sq  meters  GLUCOSE FASTING 97 mg/dL  65-99   Specimen collection should occur prior to Sulfasalazine administration due to the potential for falsely depressed results  Specimen collection should occur prior to Sulfapyridine administration due to the potential for falsely elevated results       (1) THYROID MICROSOMAL ANTIBODY [id number] 10:53AM EPIC, Provider   Test ordered by: [address]     Test Name Result Flag Reference   [facility]  [facility]mL H 0 - 34     Performed at:  [address]  [id number] : Ngoc Diaz MD, Phone:  [phone]     (1) ACUTE HEPATITIS PANEL [id number] 10:53AM EPIC, Provider   Test ordered by: [facility] Name Result Flag Reference   HEPATITIS B SURFACE ANTIGEN Non-reactive  Non-reactive, NonReactive - Confirmed   HEPATITIS A IGM ANTIBODY Non-reactive  Non-reactive, Equivocal-Suggest Recollect   HEPATITIS C ANTIBODY Non-reactive  Non-reactive   HEPATITIS B CORE IGM ANTIBODY Non-reactive Non-reactive     (1) RHEUMATOID FACTOR SCREEN [id number] 10:53AM EPIC, Provider   Test ordered by: [facility]     Test Name Result Flag Reference   RHEUMATOID FACTOR Negative  Negative     (1) IDRIS SCREEN W/REFLEX TO TITER/PATTERN [id number] 10:53AM EPIC, Provider   Test ordered by: [address] [place]     Test Name Result Flag Reference   IDRIS SCREEN  Negative  Negative     (1) THYROGLOBULIN/QUANT W/ANTIBODY PANEL [id number] 10:53AM EPIC, Provider   Test ordered by: [facility]     Test Name Result Flag Reference   THYROGLOB AB <1 0 IU/mL  0 0 - 0 9     Thyroglobulin Antibody measured by [facility] Methodology Performed at:  [address] [place] [address]  [id number] : Bethel Martínez MD, Phone:  [phone]   THYROGLOBULIN-YOVANI 34 6 ng/mL H 1 4 - 29 2     According to the [facility] of Clinical Biochemistry, the reference interval for Thyroglobulin (TG) should be related to euthyroid patients and not for patients who underwent thyroidectomy  TG reference intervals for these patients depend on the residual mass of the thyroid tissue left after surgery  Establishing a post-operative baseline is recommended   The assay limit of quantitation is 0 1 ng/mL Thyroglobulin measured by [facility] Immunometric Assay Performed at:  [address] [place] [address]  [id number] : Bethel Martínez MD, Phone:  [phone]     (1) CENTROMERE ANTIBODY [id number] 10:53AM EPIC, Provider   Test ordered by: [facility]     Test Name Result Flag Reference   CENTROMERE AB <0 2 AI  0 0 - 0 9     Performed at:  [address] [place] [address]  [id number] : Bethel Martínez MD, Phone:  [phone]     (1) DNA (DS) ANTIBODY [id number] 10:53AM EPIC, Provider   Test ordered by: [facility] [place]     Test Name Result Flag Reference   ANTI DNA DOUBLE STRANDED 1 IU/mL  0 - 9     Negative      <5                                    Equivocal  5 - 9 Positive      >9 Performed at:  [address] [place] [address]  [id number] : Jus Urbano MD, Phone:  [phone]     (1) Marquise Juan ANTIBODIES [id number] 10:53AM EPIC, Provider   Test ordered by: [facility] Name Result Flag Reference   SS-A <0 2 AI  0 0 - 0 9   SS-B <0 2 AI  0 0 - 0 9     Performed at:  [address] [place] [address]  [id number] : Jus Urbano MD, Phone:  [phone]     (1) [address] [place] [address]  [id number] 10:53AM [facility], Provider   Test ordered by: [facility]     Test Name Result Flag Reference   SCL70 AB <0 2 AI  0 0 - 0 9     Performed at:  [address] [place] [address]  [id number] : Jus Urbano MD, Phone:  [phone]     (1) PROTEIN ELECTRO, SERUM [id number] 10:53AM EPIC, Provider   Test ordered by: [facility] Name Result Flag Reference   A/G RATIO 1 50  1 10-1 80   Albumin 60 0 %  52 0-65 0   Albumin Conc  5 10 g/dl H 3 50-5 00   Alpha 1 Conc  0 38 g/dL  0 10-0 40   ALPHA 1 4 5 %  2 5-5 0   Alpha 2 Conc  0 87 g/dL  0 40-1 20   ALPHA 2 10 2 %  7 0-13 0   Beta 1 Conc  0 46 g/dL  0 40-0 80   BETA-1 5 4 %  5 0-13 0   Beta 2 Conc 0 47 g/dL  0 20-0 50   BETA-2 5 5 %  2 0-8 0   Gamma Conc 1 22 g/dL  0 50-1 60   GAMMA GLOBULIN 14 4 %  12 0-22 0   Interpretation      The serum total protein and albumin are increased  No monoclonal bands noted  Reviewed by: Stefany Maynard MD ([id number]) **Electronic Signature**   TOTAL PROTEIN   8 5 g/dL H 6 4-8 2     (1) PARVOVIRUS B-19 ANTIBODY [id number] 10:53AM EPIC, Provider   Test ordered by: [facility]     Test Name Result Flag Reference   PARVOVIRUS IGG 0 4 index  0 0 - 0 8     Negative        <0 9                                  Equivocal  0 9 - 1 1                                  Positive        >1 1   PARVOVIRUS IGM 0 1 index  0 0 - 0 8     Negative        <0 9                                  Equivocal  0 9 - 1 1 Positive        >1 1 Performed at:  [facility] [address] [place] [address]  [id number] : Charlie Cruz MD, Phone:  [phone]     (1) LYME ANTIBODY, WESTERN BLOT [id number] 10:53AM EPIC, Provider   Test ordered by: [facility] Name Result Flag Reference   LYME 18 KD IGG Absent     LYME 23 KD IGG Absent     LYME 28 KD IGG Absent     LYME 30 KD IGG Absent     LYME 39 KD IGG Absent     LYME 41 KD IGG Present A    LYME 45 KD IGG Present A    LYME 58 KD IGG Absent     LYME 66 KD IGG Absent     LYME 93 KD IGG Absent     LYME 23 KD IGM Absent     LYME 39 KD IGM Absent     LYME 41 KD IGM Absent     LYME IGG WB INTERP  Negative       Positive: 5 of the following                                Borrelia-specific bands:                                18,23,28,30,39,41,45,58,                                66, and 93  Negative: No bands or banding                                patterns which do not                                meet positive criteria  LYME IGM WB INTERP  Negative       Note: An equivocal or positive EIA result followed by a negative Western Blot result is considered NEGATIVE  An equivocal or positive EIA result followed by a positive Western Blot is considered POSITIVE by the CDC  Positive: 2 of the following bands: 23,39 or 41 Negative: No bands or banding patterns which do not meet positive criteria  Criteria for positivity are those recommended by CDC/ASTPHLD  p23=Osp C, y55=sisaulqsg Note: Sera from individuals with the following may cross react in the Lyme Western Blot assays: other spirochetal diseases (periodontal disease, leptospirosis, relapsing fever, yaws, and pinta); connective autoimmune (Rheumatoid Arthritis and Systemic Lupus Erythematosus and also individuals with Antinuclear Antibody); other infections [facility] Spotted Fever; Damon-Barr Virus, and Cytomegalovirus)   Performed at:  [facility] [place] [address] [address] [place], [place]  [id number] : Analia Luke MD, Phone:  [phone]     (1) CYCLIC CITRULLINATED PEPTIDE [id number] 10:53AM EPIC, Provider   [facility]: [facility]     Test Name Result Flag Reference   CYCLIC CITRULLINATED PEPTIDE 4 units  0 - 19     Negative               <20                           Weak positive      20 - 39                           Moderate positive  40 - 59                           Strong positive        >59 Performed at:  [address]  [id number] : Antione Keyes MD, Phone:  [phone]     (1) [facility], [address] [place] [address] [id number] 10:53AM EPIC, Provider   Test ordered by: [facility] Name Result Flag Reference   BETA-2 GLYCOPROTEIN I AB,IGG <9 GPI IgG units  0 - 20     The reference interval reflects a 3SD or 99th percentile interval, which is thought to represent a potentially clinically significant result in accordance with the International Consensus Statement on the classification criteria for definitive antiphospholipid syndrome (APS)  J Thromb Haem 2006;4:295-306  BETA-2 GLYCOPROTEIN I AB,IGM <9 GPI IgM units  0 - 32     The reference interval reflects a 3SD or 99th percentile interval, which is thought to represent a potentially clinically significant result in accordance with the International Consensus Statement on the classification criteria for definitive antiphospholipid syndrome (APS)  J Thromb Haem 2006;4:295-306  Performed at:  [address]  [id number] : Antione Keyes MD, Phone:  [phone]   Jojo Frida De Gasperi 88 <9 GPI IgA units  0 - 25     The reference interval reflects a 3SD or 99th percentile interval, which is thought to represent a potentially clinically significant result in accordance with the International Consensus Statement on the classification criteria for definitive antiphospholipid syndrome (APS)   J Thromb Haem 2006;4:295-306  (1) PROTEIN S ACTIVITY [id number] 10:53AM [facility], Provider   Test ordered by: [facility] Name Result Flag Reference   PROTEIN S ACTIVITY 140 %  63 - 140     Protein S activity may be falsely increased (masking an abnormal, low result) in patients receiving direct Xa inhibitor (e g , rivaroxaban, apixaban, edoxaban) or a direct thrombin inhibitor (e g , dabigatran) anticoagulant treatment due to assay interference by these drugs  Performed at:  [address]  [id number] : Yvonne Gold MD, Phone:  [phone]     (1) PROTEIN C ACTIVITY [id number] 10:53AM EPIC, Provider   Test ordered by: [facility] Name Result Flag Reference   PROTEIN C 137 % of Normal       (1) LUPUS ANTICOAGULANT PROFILE [id number] 10:53AM EPIC, Provider   Test ordered by: [facility] Name Result Flag Reference   PTT LUPUS ANTICOAGULANT 49 0 sec  0 0 - 51 9   DILUTE ROCIO VIPER VENOM TIME 50 7 sec H 0 0 - 47 0   DILUTE PROTHROMBIN TIME(DPT) 47 0 sec  0 0 - 55 0   THROMBIN TIME (DRVW) 17 3 sec  0 0 - 23 0   DPT CONFIRM RATIO 1 19 Ratio  0 00 - 1 40   LUPUS REFLEX INTERPRETATION Comment:       No lupus anticoagulant was detected  An extended dRVVT that corrects on mixing with normal plasma can be caused by a deficiency of one of the common pathway factors (X, V, II or fibrinogen)   Performed at:  [address]  [id number] : Yvonne Gold MD, Phone:  [phone]   DRVVT MIX INTERPRETATION 44 4 sec  0 0 - 47 0     Performed at:  [address]  [id number] : Yvonne Gold MD, Phone:  [phone]     (1) Ferd Rice PANEL [id number] 10:53AM [facility], Provider   Test ordered by: [address] [place] [address]     Test Name Result Flag Reference   BARTONELLA HENSELAE IGG Negative titer  Neg:<1:320   BARTONELLA HENSELAE IGM Negative titer Neg:<1:100   BARTONELLA HENDERSON IGG Negative titer  Neg:<1:320   BARTONELLA HENDERSON IGM Negative titer  Neg:<1:100     Note: Bartonella henselae is now regarded as the etiologic agent of Cat Scratch Disease, bacillary angiomatosis, endocarditis and fever with bacteremia  Bartonella henderson also causes bacillary angiomatosis particularly among immunocompromised patients, and trench fever  This test was developed and its performance characteristics determined by [employer]   It has not been cleared or approved by the Food and Drug Administration  The FDA has determined that such clearance or approval is not necessary  Performed at:  [address]  [id number] : Samuel Jon MD, Phone:  [phone]     (1) [address], IGG & IGM [id number] 10:53AM [facility], Provider   Test ordered by: [facility] Name Result Flag Reference   BABESIA Evelin Schmidt <1:10  Neg:<1:10     This test was developed and its performance characteristics determined by [employer]  It has not been cleared or approved by the [place]  Food and Drug Administration  The FDA has determined that such clearance or approval is not necessary  This test is used for clinical purposes  It should not be regarded as investigational or research     BABESIA MICROTI IGM <1:10  Neg:<1:10     Performed at:  [address]  [id number] : Samuel Jon MD, Phone:  [phone]     (1) [facility] [id number] 10:12AM EPIC, Provider   Test ordered by: [employer]     Test Name Result Flag Reference   RNP AB CIE ID 0 2 AI  0 0 - 0 9     Performed at:  [address]  [id number] : Charlie Cruz MD, Phone:  [phone]   FARAZ AB CIE ID <0 2 AI  0 0 - 0 9     (1) TISSUE TRANSGLUTAMINASE IGA [id number] 10:12AM EPIC, Provider   Test ordered by: [employer]     Test Name Result Flag Reference   tTG IGA <2 U/mL  0 - 3     Negative        0 -  3                               Weak Positive   4 - 10                               Positive           >10  Tissue Transglutaminase (tTG) has been identified  as the endomysial antigen  Studies have demonstr-  ated that endomysial IgA antibodies have over 99%  specificity for gluten sensitive enteropathy  Performed at:  [address] [place] [address]  [id number] : Davy Montaño MD, Phone:  [phone]       Future Appointments    Date/Time Provider Specialty Site   11/22/2017 09:00 AM LOPEZ [facility] SPINE   01/18/2018 09:00 AM WINSTON Mcdaniel   Neurology [address]       Signatures   Electronically signed by : Ana Dyer DO; Nov 10 2017  9:59AM EST                       (Author)

## 2017-11-13 LAB — HLA-B27 QL NAA+PROBE: NEGATIVE

## 2017-11-22 ENCOUNTER — GENERIC CONVERSION - ENCOUNTER (OUTPATIENT)
Dept: OTHER | Facility: OTHER | Age: 47
End: 2017-11-22

## 2017-12-13 ENCOUNTER — APPOINTMENT (OUTPATIENT)
Dept: RADIOLOGY | Facility: OTHER | Age: 47
End: 2017-12-13
Payer: COMMERCIAL

## 2017-12-13 ENCOUNTER — ALLSCRIPTS OFFICE VISIT (OUTPATIENT)
Dept: OTHER | Facility: OTHER | Age: 47
End: 2017-12-13

## 2017-12-13 DIAGNOSIS — M25.562 PAIN IN LEFT KNEE: ICD-10-CM

## 2017-12-13 PROCEDURE — 73564 X-RAY EXAM KNEE 4 OR MORE: CPT

## 2017-12-13 PROCEDURE — 73562 X-RAY EXAM OF KNEE 3: CPT

## 2017-12-15 NOTE — PROGRESS NOTES
Assessment  1  Injury of left knee, initial encounter (091 1) (X40 [id number])    Plan  Injury of left knee, initial encounter    · * MRI KNEE LEFT  WO CONTRAST; Status:Need Information - Financial Authorization; Requested for:[id number];   Left knee pain    · * XR KNEE 3 VW RIGHT NON INJURY; Status:Active - Retrospective Authorization; Requested for:[id number];    · * XR KNEE 4+ VW LEFT INJURY; Status:Active - Retrospective By ProtocolAuthorization; Requested for:[id number];     Discussion/Summary    49 y/p male with left knee pain and mechanical symptoms concerning for medial meniscus tear- will order MRI to assess any soft tissue abnormality such as meniscus tearâIce and anti-inflammatories as neededâActivity as tolerated- will f/u with MRI results  History of Present Illness  HPI: 48 y/o male with left knee pain since September 2017  Reports injury to the left knee upon pivoting laterally on the left knee  Reports pain 2-3/10  He states his issue is more so structural as his left knee tends to give out on him and making him unable to perform sports activities  Reports hx of 5 knee scopes about 15 years ago (2 on the right and 3 on the left) due to wrestling  Denies any treatment since the injury in September, 2017  Takes aspirin as needed for pain  Reports swelling to the left knee from time to time  The patient's medical history, surgical history, social history, family history, medications, allergies, and review of systems were reviewed and updated today _______________________________________________________________________________Review of Systems: Constitutional: No fever or chills, feels well, no tiredness, no recent weight gain or loss  Eyes: No complaints of eyesight problems, no red eyes  ENT: No loss of hearing, no nosebleeds, no sore throat  Cardiovascular: No chest pain, palpitations, leg claudication, or lower extremity edema  Respiratory: No shortness of breath, wheezing, or cough   Gastrointestinal: No abdominal pain, constipation, nausea / vomiting, no diarrhea  Genitourinary: No dysruia or incontinence  Musculoskeletal: As noted in HPI  Integumentary: No rash or skin lesions, no itching or dry skin, no wounds  Neurological: No headache, confusion, numbness or tingling, or dizziness  Endocrine: No muscle weakness, frequent urination, or excessive thirst  Psychiatric: No suicidal thoughts, anxiety, or depression  Review of Systems   Constitutional: No fever or chills, feels well, no tiredness, no recent weight loss or weight gain  Eyes: No complaints of red eyes, no eyesight problems  ENT: no complaints of loss of hearing, no nosebleeds, no sore throat  Cardiovascular: No complaints of chest pain, no palpitations, no leg claudication or lower extremity edema  Respiratory: No complaints of shortness of breath, no wheezing, no cough  Gastrointestinal: No complaints of abdominal pain, no constipation, no nausea or vomiting, no diarrhea or bloody stools  Genitourinary: No complaints of dysuria or incontinence, no hesitancy, no nocturia  Musculoskeletal: as noted in HPI  Integumentary: No complaints of skin rash or lesion, no itching or dry skin, no skin wounds  Neurological: No complaints of headache, no confusion, no numbness or tingling, no dizziness  Psychiatric: No suicidal thoughts, no anxiety, no depression  Endocrine: No muscle weakness, no frequent urination, no excessive thirst, no feelings of weakness  Active Problems  1  Abnormal EEG (794 02) (R94 01)   2  Acne vulgaris (706 1) (L70 0)   3  Antiphospholipid antibody syndrome (289 81) (D68 61)   4  Anxiety (300 00) (F41 9)   5  Atrial fibrillation (427 31) (I48 91)   6  Balance disorder (781 99) (R26 89)   7  Benign essential hypertension (401 1) (I10)   8  Bilateral hip pain (719 45) (M25 551,M25 552)   9  Cervical disc disease (722 91) (M50 90)   10  Cervical neuritis (723 4) (M54 12)   11   Cervical radiculitis (723 4) (M54 12) 12  Chronic daily headache (784 0) (R51)   13  Chronic kidney disease, stage 3 (585 3) (N18 3)   14  Chronic nausea (787 02) (R11 0)   15  CKD (chronic kidney disease) (585 9) (N18 9)   16  Esophageal reflux (530 81) (K21 9)   17  Gross hematuria (599 71) (R31 0)   18  Hand pain (729 5) (M79 643)   19  Hemiparesthesia (782 0) (R20 2)   20  Hypertension (401 9) (I10)   21  Hypokalemia (276 8) (E87 6)   22  Hyponatremia (276 1) (E87 1)   23  Inflammatory arthropathy (714 9) (M19 90)   24  Irritable bowel syndrome with diarrhea (564 1) (K58 0)   25  Kidney cysts (753 10) (N28 1)   26  Left knee pain (719 46) (M25 562)   27  Left nephrolithiasis (592 0) (N20 0)   28  Lumbar radiculopathy (724 4) (M54 16)   29  Lupus anticoagulant positive (795 79) (R76 0)   30  Memory loss (780 93) (R41 3)   31  Nausea (787 02) (R11 0)   32  Nephrolithiasis (592 0) (N20 0)   33  Neurocognitive deficits (781 99) (R29 818,R41 89)   34  Oscillopsia (368 15) (H53 19)   35  Osteoarthritis of both knees (715 96) (M17 0)   36  Palpitations (785 1) (R00 2)   37  Polycystic kidney (753 12) (Q61 3)   38  Pulmonary nodules (793 19) (R91 8)   39  Transient right leg weakness (729 89) (R29 898)   40  Ulnar neuritis, right (723 4) (G56 21)   41   White matter abnormality on MRI of brain (793 0) (R93 0)    Past Medical History   · History of Closed Fracture Of The Dorsal Cortical Left Triquetral Bone (814 03)   · History of Closed Fracture Of The Left Triquetral Bone (814 03)   · 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)   · History of Erectile dysfunction of non-organic origin (302 72) (F52 21)   · History of Esophagitis, reflux (530 11) (K21 0)   · History of arthritis (V13 4) (Z87 39)   · History of head injury (V15 59) (U24 971)   · History of hypertension (V12 59) (Z86 79)   · History of kidney disease (V13 09) (Z87 448)   · History of Joint Pain In Both Knees (719 46)   · History of Left Trapezius Muscle Strain (840 8)   · History of Light Colored Bowel Movement (Acholic Stools) (022 9)    The active problems and past medical history were reviewed and updated today  Surgical History   · History of Hernia Repair   · History of Knee Arthroscopy (Therapeutic)   · History of Shoulder Surgery    The surgical history was reviewed and updated today  Family History  Mother    · Family history of Blood pressure instability   · Family history of migraine headaches (V17 2) (Z82 0)   · Family history of transient ischemic attacks (V17 1) (Z82 3)  Father    · Family history of Paternity Unknown  Maternal Grandmother    · Family history of Alzheimer's disease (V17 2) (Z82 0)   · Family history of rheumatoid arthritis (V17 7) (Z82 61)  Maternal Grandfather    · Family history of cerebrovascular accident (CVA) (V17 1) (Z82 3)   · Family history of stroke (V17 1) (Z82 3)   · Family history of transient ischemic attacks (V17 1) (Z82 3)  Family History    · Family history of Arthritis (V17 7)   · Family history of hypertension (V17 49) (Z82 49)   · Family history of Osteoporosis (V17 81)    The family history was reviewed and updated today  Social History   · Being A Social Drinker   · Caffeine Use   · Completed 12th grade   · Current every day smoker (305 1) (F17 200)   · Employed   · History of marijuana use (305 23) (Z87 898)   ·   The social history was reviewed and updated today  The social history was reviewed and is unchanged  Current Meds   1  AmLODIPine Besylate 10 MG Oral Tablet; one po qd; Therapy: 59PIJ9994 to Recorded   2  Aspirin 81 MG TABS; TAKE 1 TABLET DAILY; Therapy: 86CRH5094 to (Evaluate:13Mar2016); Last Rx:43Zui3801 Ordered   3  Carvedilol 12 5 MG Oral Tablet; two tables twice daily; Therapy: 04VNM8243 to (Last Rx:18Oct2017)  Requested for: 18Oct2017 Ordered   4  Chlorthalidone 25 MG Oral Tablet; take 1 capsule by mouth daily;  Therapy: 34Ybt3170 to (Evaluate:01Exa3181)  Requested for: 26Vhi6991; Last Rx:59Kzu7142 Ordered   5  NIFEdipine ER 30 MG Oral Tablet Extended Release 24 Hour; Take 1 tablet twice daily; Therapy: 45JGM1385 to (Fabiola Lizzy)  Requested for: 93PMF9566; Last Rx:54Vxj4238 Ordered   6  Potassium Chloride ER 10 MEQ Oral Tablet Extended Release; TAKE 1 TABLET TWICE DAILY; Therapy: 26VMC3908 to (Boyd Iyer)  Requested for: 18AHJ3839; Last Rx:03Kqb6812 Ordered   7  Xanax XR 1 MG Oral Tablet Extended Release 24 Hour; One po hs; Therapy: 49MBU0977 to Recorded    The medication list was reviewed and updated today  Allergies  1  Codeine Sulfate TABS    Vitals  Signs   Heart Rate: 62  Systolic: 554  Diastolic: 87  Height: 5 ft 8 in  Weight: 140 lb   BMI Calculated: 21 29  BSA Calculated: 1 76    Physical Exam    Left Knee: Appearance: Normal except  (Tender in the medial side of the knee joint  Lachman  -   Mary  +  for medial meniscus  ROM 0-135Â°)   Constitutional - General appearance: Normal   Musculoskeletal - Gait and station: Normal -- Digits and nails: Normal -- Muscle strength/tone: Normal   Cardiovascular - Pulses: Normal -- Examination of extremities for edema and/or varicosities: Normal   Respiratory - Lungs - Clear to auscultation bilaterally, no rales, no rhonci, no wheezes  Respiratory Effort: normal respiratory rhythm and effort  Auscultation:  Skin - Skin and subcutaneous tissue: Normal   Neurologic - Sensation: Normal   Psychiatric - Orientation to person, place, and time: Normal -- Mood and affect: Normal   Eyes  Conjunctiva and lids: Normal    Pupils and irises: Normal        Results/Data  I personally reviewed the films/images/results in the office today  My interpretation follows  X-ray Review b/l knee: No degenerative changes or acute fractures        Attending Note  Attending Note [address] 14: Attending Note: I interviewed, took the history and examined the patient,-- I discussed the case with the Resident and reviewed the Resident's note-- and-- I agree with the Resident management plan as it was presented to me  I agree with the Resident's note  Future Appointments    Date/Time Provider Specialty Site   01/18/2018 09:00 AM WINSTON Joseph   Neurology [place] [facility]   12/20/2017 10:20 AM Kierra Mcqueen MD Gastroenterology Adult [facility] 9     Signatures   Electronically signed by : George Bagley DPM; Dec 13 2017 10:47AM EST                       (Author)    Electronically signed by : WINSTON Key ; Dec 13 2017  5:53PM EST                       (Author)

## 2017-12-20 ENCOUNTER — ALLSCRIPTS OFFICE VISIT (OUTPATIENT)
Dept: OTHER | Facility: OTHER | Age: 47
End: 2017-12-20

## 2017-12-21 RX ORDER — NIFEDIPINE 30 MG/1
30 TABLET, FILM COATED, EXTENDED RELEASE ORAL 2 TIMES DAILY
COMMUNITY

## 2017-12-21 RX ORDER — AMLODIPINE BESYLATE 10 MG/1
10 TABLET ORAL DAILY
COMMUNITY
End: 2018-02-23

## 2017-12-21 RX ORDER — ALPRAZOLAM 1 MG/1
1 TABLET, EXTENDED RELEASE ORAL
COMMUNITY
End: 2018-02-20

## 2017-12-21 RX ORDER — CHLORTHALIDONE 25 MG/1
25 TABLET ORAL EVERY EVENING
COMMUNITY

## 2017-12-21 RX ORDER — POTASSIUM CHLORIDE 750 MG/1
10 TABLET, FILM COATED, EXTENDED RELEASE ORAL 2 TIMES DAILY
COMMUNITY

## 2017-12-21 NOTE — CONSULTS
Assessment   1  Esophageal reflux (530 81) (K21 9)   2  Chronic nausea (787 02) (R11 0)   3  Abnormal weight loss (783 21) (R63 4)   4  Chronic diarrhea (787 91) (K52 9)   5  Esophageal dysphagia (787 20) (R13 10)   6  Abdominal pain, chronic, generalized (789 07,338 29) (R10 84,G89 29)    Plan   Abdominal pain, chronic, generalized, Abnormal weight loss, Chronic diarrhea, Chronic    nausea, Esophageal dysphagia, Esophageal reflux    · MoviPrep 100 GM Oral Solution Reconstituted; USE AS DIRECTED   Rx By: Fatemeh Og; Dispense: 0 Days ; #:1 Solution Reconstituted; Refill: 0;For: Abdominal pain, chronic, generalized, Abnormal weight loss, Chronic diarrhea, Chronic nausea, Esophageal dysphagia, Esophageal reflux; ROMELIA = N; Sent To: [facility]Y [facility]   · Follow Up After Tests Complete Evaluation and Treatment  Follow-up  Status: Hold For -    Scheduling  Requested for: 78Wqu2049   Ordered; For: Abdominal pain, chronic, generalized, Abnormal weight loss, Chronic diarrhea, Chronic nausea, Esophageal dysphagia, Esophageal reflux; Ordered By: Fatemeh Og Performed:  Due: 38XBS0310   · COLONOSCOPY (GI, SURG); Status:Hold For - Scheduling; Requested for:34Puq3739;    Perform:[facility]; Order Comments:west; Due:23Wme7463; Ordered; For:Abdominal pain, chronic, generalized, Abnormal weight loss, Chronic diarrhea, Chronic nausea, Esophageal dysphagia, Esophageal reflux; Ordered By:Sukhjinder Archuleta;   · EGD; Status:Hold For - Scheduling; Requested for:12Ftc7727;    Perform:[facility]; Order Comments:with push enteroscopy at [facility]; [id number];ABEYLJD; For:Abdominal pain, chronic, generalized, Abnormal weight loss, Chronic diarrhea, Chronic nausea, Esophageal dysphagia, Esophageal reflux; Ordered By:Sukhjinder Archuleta;    Discussion/Summary   Discussion Summary:    He has multiple chronic gastrointestinal complaints including dysphagia, reflux, nausea, vomiting, abdominal pain, diarrhea, and weight loss   I am especially concerned about his 40 lb of weight loss  I will schedule him for an upper endoscopy and colonoscopy to rule out inflammatory bowel disease, celiac sprue, Helicobacter pylori infection, peptic ulcer disease, and malignancy  I spoke to him about the benefits and risks of the procedures as well as instructions for the bowel preparation and answered all of his questions  His CT scan findings of possible enteritis or likely due to an infectious enteritis or a nonspecific finding  Crohn's disease would be less likely but I will plan for a push enteroscopy to evaluate for this as well  Chief Complaint   Chief Complaint Free Text Note Form: Patient here for GI consult Patient has c/o abdominal pain, recent weight loss, trouble swallowing, symptoms started 10 yr ago  History of Present Illness   HPI: He presents for evaluation because of approximately 10 years of abdominal pain, reflux, nausea, vomiting, dysphagia, diarrhea, and about 40 lb of weight loss  The symptoms have been intermittent but he feels the worsened recently  He has not had any bleeding and he denies any prior history of a colonoscopy  His last upper endoscopy was approximately five years ago and he believes it was unremarkable  He denies any family history of colon polyps or colon cancer  His CT scan revealed possible thickening of his proximal small bowel indicating a possible enteritis  History Reviewed: The history was obtained today from the patient and I agree with the documented history  Review of Systems   Complete-Male GI Adult:      Constitutional: No fever or chills, feels well, no tiredness, no recent weight gain or weight loss  Eyes: No complaints of eye pain, no red eyes, no discharge from eyes, no itchy eyes  ENT: no complaints of earache, no hearing loss, no nosebleeds, no nasal discharge, no sore throat, no hoarseness        Cardiovascular: No complaints of slow heart rate, no fast heart rate, no chest pain, no palpitations, no leg claudication, no lower extremity  Respiratory: No complaints of shortness of breath, no wheezing, no cough, no SOB on exertion, no orthopnea or PND  Gastrointestinal: abdominal pain,-- nausea,-- vomiting-- and-- diarrhea, but-- as noted in HPI  Genitourinary: No complaints of dysuria, no incontinence, no hesitancy, no nocturia, no genital lesion, no testicular pain  Musculoskeletal: No complaints of arthralgia, no myalgias, no joint swelling or stiffness, no limb pain or swelling  Integumentary: No complaints of skin rash or skin lesions, no itching, no skin wound, no dry skin  Neurological: No compliants of headache, no confusion, no convulsions, no numbness or tingling, no dizziness or fainting, no limb weakness, no difficulty walking  Psychiatric: Is not suicidal, no sleep disturbances, no anxiety or depression, no change in personality, no emotional problems  Endocrine: No complaints of proptosis, no hot flashes, no muscle weakness, no erectile dysfunction, no deepening of the voice, no feelings of weakness  Hematologic/Lymphatic: No complaints of swollen glands, no swollen glands in the neck, does not bleed easily, no easy bruising  ROS Reviewed:    ROS reviewed  Active Problems   1  Abnormal EEG (794 02) (R94 01)   2  Acne vulgaris (706 1) (L70 0)   3  Antiphospholipid antibody syndrome (289 81) (D68 61)   4  Anxiety (300 00) (F41 9)   5  Atrial fibrillation (427 31) (I48 91)   6  Balance disorder (781 99) (R26 89)   7  Benign essential hypertension (401 1) (I10)   8  Bilateral hip pain (719 45) (M25 551,M25 552)   9  Cervical disc disease (722 91) (M50 90)   10  Cervical neuritis (723 4) (M54 12)   11  Cervical radiculitis (723 4) (M54 12)   12  Chronic daily headache (784 0) (R51)   13  Chronic kidney disease, stage 3 (585 3) (N18 3)   14  Chronic nausea (787 02) (R11 0)   15   CKD (chronic kidney disease) (585 9) (N18 9)   16  Esophageal reflux (530 81) (K21 9)   17  Gross hematuria (599 71) (R31 0)   18  Hand pain (729 5) (M79 643)   19  Hemiparesthesia (782 0) (R20 2)   20  Hypertension (401 9) (I10)   21  Hypokalemia (276 8) (E87 6)   22  Hyponatremia (276 1) (E87 1)   23  Inflammatory arthropathy (714 9) (M19 90)   24  Injury of left knee, initial encounter (959 7) (S89 92XA)   25  Irritable bowel syndrome with diarrhea (564 1) (K58 0)   26  Kidney cysts (753 10) (N28 1)   27  Left knee pain (719 46) (M25 562)   28  Left nephrolithiasis (592 0) (N20 0)   29  Lumbar radiculopathy (724 4) (M54 16)   30  Lupus anticoagulant positive (795 79) (R76 0)   31  Memory loss (780 93) (R41 3)   32  Nausea (787 02) (R11 0)   33  Nephrolithiasis (592 0) (N20 0)   34  Neurocognitive deficits (781 99) (R29 818,R41 89)   35  Oscillopsia (368 15) (H53 19)   36  Osteoarthritis of both knees (715 96) (M17 0)   37  Palpitations (785 1) (R00 2)   38  Polycystic kidney (753 12) (Q61 3)   39  Pulmonary nodules (793 19) (R91 8)   40  Transient right leg weakness (729 89) (R29 898)   41  Ulnar neuritis, right (723 4) (G56 21)   42  White matter abnormality on MRI of brain (793 0) (R93 0)    Past Medical History   1  History of Closed Fracture Of The Dorsal Cortical Left Triquetral Bone (814 03)   2  History of Closed Fracture Of The Left Triquetral Bone (814 03)   3  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)   4  History of Erectile dysfunction of non-organic origin (302 72) (F52 21)   5  History of Esophagitis, reflux (530 11) (K21 0)   6  History of arthritis (V13 4) (Z87 39)   7  History of head injury (V15 59) (Z87 828)   8  History of hypertension (V12 59) (Z86 79)   9  History of kidney disease (V13 09) (Z87 448)   10  History of Joint Pain In Both Knees (719 46)   11  History of Left Trapezius Muscle Strain (840 8)   12   History of Light Colored Bowel Movement (Acholic Stools) (979 0)  Active Problems And Past Medical History Reviewed: The active problems and past medical history were reviewed and updated today  Surgical History   1  History of Hernia Repair   2  History of Knee Arthroscopy (Therapeutic)   3  History of Shoulder Surgery  Surgical History Reviewed: The surgical history was reviewed and updated today  Family History   Mother    1  Family history of Blood pressure instability   2  Family history of migraine headaches (V17 2) (Z82 0)   3  Family history of transient ischemic attacks (V17 1) (Z82 3)  Father    4  Family history of Paternity Unknown  Maternal Grandmother    5  Family history of Alzheimer's disease (V17 2) (Z82 0)   6  Family history of rheumatoid arthritis (V17 7) (Z82 61)  Maternal Grandfather    7  Family history of cerebrovascular accident (CVA) (V17 1) (Z82 3)   8  Family history of stroke (V17 1) (Z82 3)   9  Family history of transient ischemic attacks (V17 1) (Z82 3)  Family History    10  Family history of Arthritis (V17 7)   11  Family history of hypertension (V17 49) (Z82 49)   12  Family history of Osteoporosis (V17 81)  Family History Reviewed: The family history was reviewed and updated today  Social History    · Being A Social Drinker   · Caffeine Use   · Completed 12th grade   · Current every day smoker (305 1) (F17 200)   · Employed   · History of marijuana use (305 23) (Z87 898)   ·   Social History Reviewed: The social history was reviewed and updated today  Current Meds    1  AmLODIPine Besylate 10 MG Oral Tablet; one po qd; Therapy: 31JYN8140 to Recorded   2  Aspirin 81 MG TABS; TAKE 1 TABLET DAILY; Therapy: 32NZG2050 to (Evaluate:13Mar2016); Last Rx:98Xuw9154 Ordered   3  Carvedilol 12 5 MG Oral Tablet; two tables twice daily; Therapy: 30IZM2387 to (Last Rx:04Goz5716)  Requested for: 18Oct2017 Ordered   4  Chlorthalidone 25 MG Oral Tablet; take 1 capsule by mouth daily;      Therapy: 37Rhy6396 to (Evaluate:97Vjj3953)  Requested for: 21Hop4120; Last Rx:97Vyv8936 Ordered   5  NIFEdipine ER 30 MG Oral Tablet Extended Release 24 Hour; Take 1 tablet twice daily; Therapy: 47IFQ4839 to (Nikolai Mathew)  Requested for: 51GJA4873; Last     Rx:85Knb5245 Ordered   6  Potassium Chloride ER 10 MEQ Oral Tablet Extended Release; TAKE 1 TABLET TWICE     DAILY; Therapy: 88JJI3563 to (Marly Albarran)  Requested for: 80WXA5808; Last     Rx:23Vid2040 Ordered   7  Xanax XR 1 MG Oral Tablet Extended Release 24 Hour; One po hs; Therapy: 36WDB1658 to Recorded  Medication List Reviewed: The medication list was reviewed and updated today  Allergies   1  Codeine Sulfate TABS    Physical Exam        Constitutional      General appearance: No acute distress, well appearing and well nourished  Eyes      Conjunctiva and lids: No swelling, erythema, or discharge  Pupils and irises: Equal, round and reactive to light  Ears, Nose, Mouth, and Throat      External inspection of ears and nose: Normal        Nasal mucosa, septum, and turbinates: Normal without edema or erythema  Oropharynx: Normal with no erythema, edema, exudate or lesions  Pulmonary      Respiratory effort: No increased work of breathing or signs of respiratory distress  Auscultation of lungs: Clear to auscultation, equal breath sounds bilaterally, no wheezes, no rales, no rhonci  Cardiovascular      Auscultation of heart: Normal rate and rhythm, normal S1 and S2, without murmurs  Examination of extremities for edema and/or varicosities: Normal        Abdomen      Abdomen: Non-tender, no masses  Liver and spleen: No hepatomegaly or splenomegaly  Lymphatic      Palpation of lymph nodes in neck: No lymphadenopathy  Musculoskeletal      Gait and station: Normal        Digits and nails: Normal without clubbing or cyanosis         Inspection/palpation of joints, bones, and muscles: Normal        Skin      Skin and subcutaneous tissue: Normal without rashes or lesions  Psychiatric      Orientation to person, place and time: Normal        Mood and affect: Normal           Future Appointments      Date/Time Provider Specialty Site   01/18/2018 09:00 AM WINSTON Le   Neurology [id number]     Signatures    Electronically signed by : Liam Neely MD; Dec 20 2017 11:07AM EST                       (Author)

## 2017-12-28 ENCOUNTER — ANESTHESIA EVENT (OUTPATIENT)
Dept: GASTROENTEROLOGY | Facility: MEDICAL CENTER | Age: 47
End: 2017-12-28
Payer: COMMERCIAL

## 2017-12-29 ENCOUNTER — GENERIC CONVERSION - ENCOUNTER (OUTPATIENT)
Dept: GASTROENTEROLOGY | Facility: CLINIC | Age: 47
End: 2017-12-29

## 2017-12-29 ENCOUNTER — HOSPITAL ENCOUNTER (OUTPATIENT)
Facility: MEDICAL CENTER | Age: 47
Setting detail: OUTPATIENT SURGERY
Discharge: HOME | End: 2017-12-29
Attending: INTERNAL MEDICINE | Admitting: INTERNAL MEDICINE
Payer: COMMERCIAL

## 2017-12-29 ENCOUNTER — ANESTHESIA (OUTPATIENT)
Dept: GASTROENTEROLOGY | Facility: MEDICAL CENTER | Age: 47
End: 2017-12-29
Payer: COMMERCIAL

## 2017-12-29 VITALS
TEMPERATURE: 98.8 F | OXYGEN SATURATION: 98 % | SYSTOLIC BLOOD PRESSURE: 164 MMHG | RESPIRATION RATE: 18 BRPM | BODY MASS INDEX: 21.29 KG/M2 | HEART RATE: 78 BPM | DIASTOLIC BLOOD PRESSURE: 99 MMHG | WEIGHT: 140 LBS

## 2017-12-29 DIAGNOSIS — R10.84 GENERALIZED ABDOMINAL PAIN: ICD-10-CM

## 2017-12-29 DIAGNOSIS — R63.4 ABNORMAL WEIGHT LOSS: ICD-10-CM

## 2017-12-29 DIAGNOSIS — R13.10 DYSPHAGIA: ICD-10-CM

## 2017-12-29 DIAGNOSIS — G89.29 OTHER CHRONIC PAIN: ICD-10-CM

## 2017-12-29 DIAGNOSIS — K21.9 GASTRO-ESOPHAGEAL REFLUX DISEASE WITHOUT ESOPHAGITIS: ICD-10-CM

## 2017-12-29 DIAGNOSIS — R11.0 NAUSEA: ICD-10-CM

## 2017-12-29 DIAGNOSIS — K52.9 NONINFECTIVE GASTROENTERITIS AND COLITIS: ICD-10-CM

## 2017-12-29 PROCEDURE — 88305 TISSUE EXAM BY PATHOLOGIST: CPT | Performed by: INTERNAL MEDICINE

## 2017-12-29 PROCEDURE — 88342 IMHCHEM/IMCYTCHM 1ST ANTB: CPT | Performed by: INTERNAL MEDICINE

## 2017-12-29 RX ORDER — PROPOFOL 10 MG/ML
INJECTION, EMULSION INTRAVENOUS AS NEEDED
Status: DISCONTINUED | OUTPATIENT
Start: 2017-12-29 | End: 2017-12-29 | Stop reason: SURG

## 2017-12-29 RX ORDER — SIMETHICONE 20 MG/.3ML
EMULSION ORAL AS NEEDED
Status: DISCONTINUED | OUTPATIENT
Start: 2017-12-29 | End: 2017-12-29 | Stop reason: HOSPADM

## 2017-12-29 RX ORDER — SODIUM CHLORIDE 9 MG/ML
125 INJECTION, SOLUTION INTRAVENOUS CONTINUOUS
Status: DISCONTINUED | OUTPATIENT
Start: 2017-12-29 | End: 2017-12-29 | Stop reason: HOSPADM

## 2017-12-29 RX ADMIN — SODIUM CHLORIDE 125 ML/HR: 0.9 INJECTION, SOLUTION INTRAVENOUS at 13:40

## 2017-12-29 RX ADMIN — PROPOFOL 50 MG: 10 INJECTION, EMULSION INTRAVENOUS at 14:19

## 2017-12-29 RX ADMIN — PROPOFOL 50 MG: 10 INJECTION, EMULSION INTRAVENOUS at 14:25

## 2017-12-29 RX ADMIN — PROPOFOL 50 MG: 10 INJECTION, EMULSION INTRAVENOUS at 14:12

## 2017-12-29 RX ADMIN — PROPOFOL 150 MG: 10 INJECTION, EMULSION INTRAVENOUS at 13:58

## 2017-12-29 RX ADMIN — PROPOFOL 50 MG: 10 INJECTION, EMULSION INTRAVENOUS at 14:05

## 2017-12-29 NOTE — DISCHARGE INSTRUCTIONS
Upper Endoscopy   WHAT YOU NEED TO KNOW:   An upper endoscopy is also called an upper gastrointestinal (GI) endoscopy, or an esophagogastroduodenoscopy (EGD)  You may feel bloated, gassy, or have some abdominal discomfort after your procedure  Your throat may be sore for 24 to 36 hours  You may burp or pass gas from air that is still inside your body  DISCHARGE INSTRUCTIONS:   Call 911 if:   · You have sudden chest pain or trouble breathing  Seek care immediately if:   · You feel dizzy or faint  · You have trouble swallowing  · You have severe throat pain  · Your bowel movements are very dark or black  · Your abdomen is hard and firm and you have severe pain  · You vomit blood  Contact your healthcare provider if:   · You feel full or bloated and cannot burp or pass gas  · You have not had a bowel movement for 3 days after your procedure  · You have neck pain  · You have a fever or chills  · You have nausea or are vomiting  · You have a rash or hives  · You have questions or concerns about your endoscopy  Relieve a sore throat:  Suck on throat lozenges or crushed ice  Gargle with a small amount of warm salt water  Mix 1 teaspoon of salt and 1 cup of warm water to make salt water  Relieve gas and discomfort from bloating:  Lie on your right side with a heating pad on your abdomen  Take short walks to help pass gas  Eat small meals until bloating is relieved  Rest after your procedure:  Do not drive or make important decisions until the day after your procedure  Return to your normal activity as directed  You can usually return to work the day after your procedure  Follow up with your healthcare provider as directed:  Write down your questions so you remember to ask them during your visits  © 2017 [employer]  Information is for End User's use only and may not be sold, redistributed or otherwise used for commercial purposes   All illustrations and images included in [address] are the copyrighted property of [employer]  or [facility]   The above information is an  only  It is not intended as medical advice for individual conditions or treatments  Talk to your doctor, nurse or pharmacist before following any medical regimen to see if it is safe and effective for you  Hiatal Hernia   WHAT YOU NEED TO KNOW:   What is a hiatal hernia? A hiatal hernia is a condition that causes part of your stomach to bulge through the hiatus (small opening) in your diaphragm  The part of the stomach may move up and down, or it may get trapped above the diaphragm  What increases my risk for a hiatal hernia? The exact cause of a hiatal hernia is not known  You may have been born with a large hiatus  The following may increase your risk of a hiatal hernia:  · Obesity    · Older age    · Medical conditions such as diverticulosis or esophagitis    · Previous surgery of the esophagus or stomach or trauma such as from a motor vehicle accident  What are the types of hiatal hernia? · Type I (sliding hiatal hernia): A portion of the stomach slides in and out of the hiatus  This type is the most common and usually causes gastroesophageal reflux disease (GERD)  GERD occurs when the esophageal sphincter does not close properly and causes acid reflux  The esophageal sphincter is the lower muscle of the esophagus  · Type II (paraesophageal hiatal hernia):  Type II hiatal hernia forms when a part of the stomach squeezes through the hiatus and lies next to the esophagus  · Type III (combined):  Type III hiatal hernia is a combination of a sliding and a paraesophageal hiatal hernia  · Type IV (complex paraesophageal hiatal hernia): The whole stomach, the small and large bowels, spleen, pancreas, or liver is pushed up into the chest   What are the signs and symptoms of a hiatal hernia? The most common symptom is heartburn   This usually occurs after meals and spreads to your neck, jaw, or shoulder  You may have no signs or symptoms, or you may have any of the following:  · Abdominal pain, especially in the area just above your navel    · Bitter or acid taste in your mouth    · Trouble swallowing    · Coughing or hoarseness    · Chest pain or shortness of breath that occurs after eating    · Frequent burping or hiccups    · Uncomfortable feeling of fullness after eating  How is a hiatal hernia diagnosed? · An upper GI series test  includes x-rays of your esophagus, stomach, and your small intestines  It is also called a barium swallow test  You will be given barium (a chalky liquid) to drink before the pictures are taken  This liquid helps your stomach and intestines show up better on the x-rays  An upper GI series can show if you have an ulcer, a blocked intestine, or other problems  · An endoscopy  uses a scope to see the inside of your digestive tract  A scope is a long, bendable tube with a light on the end of it  A camera may be hooked to the scope to take pictures  How is a hiatal hernia treated? Treatment depends on the type of hiatal hernia you have and on your symptoms  You may not need any treatment  You may need any of the following:  · Medicines  may be given to relieve heartburn symptoms  These medicines help to decrease or block stomach acid  You may also be given medicines that help to tighten the esophageal sphincter  · Surgery  may be done when medicines cannot control your symptoms, or other problems are present  Your healthcare provider may also suggest surgery depending on the type of hernia you have  Your healthcare provider can put your stomach back into its normal location  He may make the hiatus (hole) smaller and anchor your stomach in your abdomen  Fundoplication is a surgery that wraps the upper part of the stomach around the esophageal sphincter to strengthen it  How can I manage symptoms?   The following nutrition and lifestyle changes may be recommended to relieve symptoms of heartburn  · Avoid foods that make your symptoms worse  These may include spicy foods, fruit juices, alcohol, caffeine, chocolate, and mint  · Eat several small meals during the day  Small meals give your stomach less food to digest     · Avoid lying down and bending forward after you eat  Do not eat meals 2 to 3 hours before bedtime  This decreases your risk for reflux  · Maintain a healthy weight  If you are overweight, weight loss may help relieve your symptoms  · Sleep with your head elevated  at least 6 inches  · Do not smoke  Smoking can increase your symptoms of heartburn  When should I seek immediate care? · You have severe abdominal pain  · You try to vomit but nothing comes out (retching)  · You have severe chest pain and sudden trouble breathing  · Your bowel movements are black or bloody  · Your vomit looks like coffee grounds or has blood in it  When should I contact my healthcare provider? · Your symptoms are getting worse  · You have nausea, and you are vomiting  · You are losing weight without trying  · You have questions or concerns about your condition or care  CARE AGREEMENT:   You have the right to help plan your care  Learn about your health condition and how it may be treated  Discuss treatment options with your caregivers to decide what care you want to receive  You always have the right to refuse treatment  The above information is an  only  It is not intended as medical advice for individual conditions or treatments  Talk to your doctor, nurse or pharmacist before following any medical regimen to see if it is safe and effective for you  © 2017 [address] Information is for End User's use only and may not be sold, redistributed or otherwise used for commercial purposes   All illustrations and images included in CareNotes® are the copyrighted property of [employer] M [employer]  or [employer]  Gastritis   WHAT YOU NEED TO KNOW:   Gastritis is inflammation or irritation of the lining of your stomach  DISCHARGE INSTRUCTIONS:   Call 911 for any of the following:   · You develop chest pain or shortness of breath  Seek care immediately if:   · You vomit blood  · You have black or bloody bowel movements  · You have severe stomach or back pain  Contact your healthcare provider if:   · You have a fever  · You have new or worsening symptoms, even after treatment  · You have questions or concerns about your condition or care  Medicines:   · Medicines  may be given to help treat a bacterial infection or decrease stomach acid  · Take your medicine as directed  Contact your healthcare provider if you think your medicine is not helping or if you have side effects  Tell him or her if you are allergic to any medicine  Keep a list of the medicines, vitamins, and herbs you take  Include the amounts, and when and why you take them  Bring the list or the pill bottles to follow-up visits  Carry your medicine list with you in case of an emergency  Manage or prevent gastritis:   · Do not smoke  Nicotine and other chemicals in cigarettes and cigars can make your symptoms worse and cause lung damage  Ask your healthcare provider for information if you currently smoke and need help to quit  E-cigarettes or smokeless tobacco still contain nicotine  Talk to your healthcare provider before you use these products  · Do not drink alcohol  Alcohol can prevent healing and make your gastritis worse  Talk to your healthcare provider if you need help to stop drinking  · Do not take NSAIDs or aspirin unless directed  These and similar medicines can cause irritation  If your healthcare provider says it is okay to take NSAIDs, take them with food  · Do not eat foods that cause irritation    Foods such as oranges and salsa can cause burning or pain  Eat a variety of healthy foods  Examples include fruits (not citrus), vegetables, low-fat dairy products, beans, whole-grain breads, and lean meats and fish  Try to eat small meals, and drink water with your meals  Do not eat for at least 3 hours before you go to bed  · Find ways to relax and decrease stress  Stress can increase stomach acid and make gastritis worse  Activities such as yoga, meditation, or listening to music can help you relax  Spend time with friends, or do things you enjoy  Follow up with your healthcare provider as directed: You may need ongoing tests or treatment, or referral to a gastroenterologist  Write down your questions so you remember to ask them during your visits  © 2017 [address]  Information is for End User's use only and may not be sold, redistributed or otherwise used for commercial purposes  All illustrations and images included in CareNotes® are the copyrighted property of A D A M , Inc  or L[employer]  The above information is an  only  It is not intended as medical advice for individual conditions or treatments  Talk to your doctor, nurse or pharmacist before following any medical regimen to see if it is safe and effective for you  Colonoscopy   WHAT YOU NEED TO KNOW:   A colonoscopy is a procedure to examine the inside of your colon (intestine) with a scope  Polyps or tissue growths may have been removed during your colonoscopy  It is normal to feel bloated and to have some abdominal discomfort  You should be passing gas  If you have hemorrhoids or you had polyps removed, you may have a small amount of bleeding  DISCHARGE INSTRUCTIONS:   Seek care immediately if:   · You have a large amount of bright red blood in your bowel movements  · Your abdomen is hard and firm and you have severe pain  · You have sudden trouble breathing    Contact your healthcare provider if:   · You develop a rash or hives  · You have a fever within 24 hours of your procedure  · You have not had a bowel movement for 3 days after your procedure  · You have questions or concerns about your condition or care  Activity:   · Do not lift, strain, or run  for 3 days after your procedure  · Rest after your procedure  You have been given medicine to relax you  Do not  drive or make important decisions until the day after your procedure  Return to your normal activity as directed  · Relieve gas and discomfort from bloating  by lying on your right side with a heating pad on your abdomen  You may need to take short walks to help the gas move out  Eat small meals until bloating is relieved  If you had polyps removed: For 7 days after your procedure:  · Do not  take aspirin  · Do not  go on long car rides  Help prevent constipation:   · Eat a variety of healthy foods  Healthy foods include fruit, vegetables, whole-grain breads, low-fat dairy products, beans, lean meat, and fish  Ask if you need to be on a special diet  Your healthcare provider may recommend that you eat high-fiber foods such as cooked beans  Fiber helps you have regular bowel movements  · Drink liquids as directed  Adults should drink between 9 and 13 eight-ounce cups of liquid every day  Ask what amount is best for you  For most people, good liquids to drink are water, juice, and milk  · Exercise as directed  Talk to your healthcare provider about the best exercise plan for you  Exercise can help prevent constipation, decrease your blood pressure and improve your health  Follow up with your healthcare provider as directed:  Write down your questions so you remember to ask them during your visits  © 2017 [facility] Information is for End User's use only and may not be sold, redistributed or otherwise used for commercial purposes   All illustrations and images included in CareNotes® are the copyrighted property of [employer]  or [employer]  The above information is an  only  It is not intended as medical advice for individual conditions or treatments  Talk to your doctor, nurse or pharmacist before following any medical regimen to see if it is safe and effective for you  Colonoscopy   WHAT YOU NEED TO KNOW:   A colonoscopy is a procedure to examine the inside of your colon (intestine) with a scope  Polyps or tissue growths may have been removed during your colonoscopy  It is normal to feel bloated and to have some abdominal discomfort  You should be passing gas  If you have hemorrhoids or you had polyps removed, you may have a small amount of bleeding  DISCHARGE INSTRUCTIONS:   Seek care immediately if:   · You have a large amount of bright red blood in your bowel movements  · Your abdomen is hard and firm and you have severe pain  · You have sudden trouble breathing  Contact your healthcare provider if:   · You develop a rash or hives  · You have a fever within 24 hours of your procedure  · You have not had a bowel movement for 3 days after your procedure  · You have questions or concerns about your condition or care  Activity:   · Do not lift, strain, or run  for 3 days after your procedure  · Rest after your procedure  You have been given medicine to relax you  Do not  drive or make important decisions until the day after your procedure  Return to your normal activity as directed  · Relieve gas and discomfort from bloating  by lying on your right side with a heating pad on your abdomen  You may need to take short walks to help the gas move out  Eat small meals until bloating is relieved  If you had polyps removed: For 7 days after your procedure:  · Do not  take aspirin  · Do not  go on long car rides  Help prevent constipation:   · Eat a variety of healthy foods    Healthy foods include fruit, vegetables, whole-grain breads, low-fat dairy products, beans, lean meat, and fish  Ask if you need to be on a special diet  Your healthcare provider may recommend that you eat high-fiber foods such as cooked beans  Fiber helps you have regular bowel movements  · Drink liquids as directed  Adults should drink between 9 and 13 eight-ounce cups of liquid every day  Ask what amount is best for you  For most people, good liquids to drink are water, juice, and milk  · Exercise as directed  Talk to your healthcare provider about the best exercise plan for you  Exercise can help prevent constipation, decrease your blood pressure and improve your health  Follow up with your healthcare provider as directed:  Write down your questions so you remember to ask them during your visits  © 2017 [address] [facility] Information is for End User's use only and may not be sold, redistributed or otherwise used for commercial purposes  All illustrations and images included in CareNotes® are the copyrighted property of A D A M , Inc  or [employer]  The above information is an  only  It is not intended as medical advice for individual conditions or treatments  Talk to your doctor, nurse or pharmacist before following any medical regimen to see if it is safe and effective for you  Diverticulosis   WHAT YOU NEED TO KNOW:   Diverticulosis is a condition that causes small pockets called diverticula to form in your intestine  These pockets make it difficult for bowel movements to pass through your digestive system  DISCHARGE INSTRUCTIONS:   Seek care immediately if:   · You have severe pain on the left side of your lower abdomen  · Your bowel movements are bright or dark red  Contact your healthcare provider if:   · You have a fever and chills  · You feel dizzy or lightheaded  · You have nausea, or you are vomiting  · You have a change in your bowel movements      · You have questions or concerns about your condition or care  Medicines:   · Medicines  to soften your bowel movements may be given  You may also need medicines to treat symptoms such as bloating and pain  · Take your medicine as directed  Contact your healthcare provider if you think your medicine is not helping or if you have side effects  Tell him or her if you are allergic to any medicine  Keep a list of the medicines, vitamins, and herbs you take  Include the amounts, and when and why you take them  Bring the list or the pill bottles to follow-up visits  Carry your medicine list with you in case of an emergency  Self-care: The goal of treatment is to manage any symptoms you have and prevent other problems such as diverticulitis  Diverticulitis is swelling or infection of the diverticula  Your healthcare provider may recommend any of the following:  · Eat a variety of high-fiber foods  High-fiber foods help you have regular bowel movements  High-fiber foods include cooked beans, fruits, vegetables, and some cereals  Most adults need 25 to 35 grams of fiber each day  Your healthcare provider may recommend that you have more  Ask your healthcare provider how much fiber you need  Increase fiber slowly  You may have abdominal discomfort, bloating, and gas if you add fiber to your diet too quickly  You may need to take a fiber supplement if you are not getting enough fiber from food  · Drink liquids as directed  You may need to drink 2 to 3 liters (8 to 12 cups) of liquids every day  Ask your healthcare provider how much liquid to drink each day and which liquids are best for you  · Apply heat  on your abdomen for 20 to 30 minutes every 2 hours for as many days as directed  Heat helps decrease pain and muscle spasms  Help prevent diverticulitis or other symptoms: The following may help decrease your risk for diverticulitis or symptoms, such as bleeding   Talk to your provider about these or other things you can do to prevent problems that may occur with diverticulosis  · Exercise regularly  Ask your healthcare provider about the best exercise plan for you  Exercise can help you have regular bowel movements  Get 30 minutes of exercise on most days of the week  · Maintain a healthy weight  Ask your healthcare provider how much you should weigh  Ask him or her to help you create a weight loss plan if you are overweight  · Do not smoke  Nicotine and other chemicals in cigarettes increase your risk for diverticulitis  Ask your healthcare provider for information if you currently smoke and need help to quit  E-cigarettes or smokeless tobacco still contain nicotine  Talk to your healthcare provider before you use these products  · Ask your healthcare provider if it is safe to take NSAIDs  NSAIDs may increase your risk of diverticulitis  Follow up with your healthcare provider as directed:  Write down your questions so you remember to ask them during your visits  © 2017 [address] Information is for End User's use only and may not be sold, redistributed or otherwise used for commercial purposes  All illustrations and images included in CareNotes® are the copyrighted property of [employer] M , [employer]  or [employer]  The above information is an  only  It is not intended as medical advice for individual conditions or treatments  Talk to your doctor, nurse or pharmacist before following any medical regimen to see if it is safe and effective for you  Diverticulosis Diet   AMBULATORY CARE:   A diverticulosis diet  includes high-fiber foods  High-fiber foods help you have regular bowel movements  Extra fiber may decrease your risk of forming new diverticula (small pockets) in your intestine  A high-fiber diet may also help prevent diverticulitis  Diverticulitis is a painful condition that occurs when diverticula become inflamed or infected   You do not need to avoid nuts, seeds, corn, or popcorn while you are on a diverticulosis diet  Contact your healthcare provider if:   · You have questions about a high-fiber diet  · You have a change in your bowel movements  · You have an upset stomach  · You have a fever  · You have pain in your lower abdomen on the left side  · You have questions about your condition or care  Amount of fiber you need: You may need 25 to 35 grams of fiber each day  Ask your dietitian or healthcare provider how much fiber you should have  Increase your intake of fiber slowly  When you eat more fiber, you may have gas and feel bloated  You may need to take a fiber supplement if you do not get enough fiber from food  Drink plenty of liquids as you increase the fiber in your diet  Your dietitian or healthcare provider may recommend 8 eight-ounce cups or more each day  Ask which liquids are best for you  Foods that are high in fiber:   · Foods with at least 4 grams of fiber per serving:      ¨ ? to ½ cup of high-fiber cereal (check the nutrition label on the box)    ¨ ½ cup of blackberries or raspberries    ¨ 4 dried prunes    ¨ 1 cooked artichoke    ¨ ½ cup of cooked legumes, such as lentils, or red, kidney, and chen beans    · Foods with 1 to 3 grams of fiber per serving:      ¨ 1 slice of whole-wheat, pumpernickel, or rye bread    ¨ 4 whole-wheat crackers    ¨ ½ cup of cereal with 1 to 3 grams of fiber per serving (check the nutrition label on the box)    ¨ 1 piece of fruit, such as an apple, banana, pear, kiwi, or orange    ¨ 3 dates    ¨ ½ cup of canned apricots, fruit cocktail, peaches, or pears    ¨ ½ cup of raw or cooked vegetables, such as carrots, cauliflower, cabbage, spinach, squash, or corn  © 2017 [facility] Information is for End User's use only and may not be sold, redistributed or otherwise used for commercial purposes   All illustrations and images included in CareNotes® are the copyrighted property of A D A M , Inc  or [employer]  The above information is an  only  It is not intended as medical advice for individual conditions or treatments  Talk to your doctor, nurse or pharmacist before following any medical regimen to see if it is safe and effective for you  Colonoscopy   AMBULATORY CARE:   What you need to know about a colonoscopy:  A colonoscopy is a procedure to examine the inside of your colon (intestine) with a scope  A scope is a flexible tube with a small light and camera on the end  Polyps or tissue growths may be removed during your colonoscopy  What you need to do the week before your colonoscopy: You will need to stop taking medicines that contain aspirin or iron for 7 days before your colonoscopy  If you take anticoagulants, such as warfarin, ask when you should stop taking it  Make plans for someone to drive you home after your procedure  How to prepare for your colonoscopy: Your healthcare provider will have you prepare your bowels before your procedure  Your bowels will need to be empty before your procedure to allow him to clearly see your colon  You will need to do the following the day before your procedure:  · Have only clear liquids  for the entire day before your colonoscopy  Clear liquid diet includes clear fruit juices and broths, clear flavored gelatin, and hard candy  It also includes coffee, tea, carbonated beverages, and clear sports drinks  · Follow your bowel prep as directed  There are many different preparations that can be given before a colonoscopy  Some are given over 2 hours and others over 6 hours  Some are given earlier in the afternoon the day before the colonoscopy  Others are given the day before and then the morning of the colonoscopy  With any bowel prep, stay close to the bathroom  This liquid will cause your bowels to move frequently  · An enema  may be needed   Your healthcare provider may tell you to use an enema to help clean out your bowels  · Do not eat or drink anything after midnight  This will help prevent problems that can happen if you vomit while under anesthesia  What will happen during your colonoscopy:   · You will be given medicine to help you relax  You will lie on your left side and raise one or both knees toward your chest  Your healthcare provider will examine your anus and use a finger to check your rectum  You may need another enema if your bowel is not empty  The scope will be lubricated and gently placed into your anus  It will then be passed through your rectum and into your colon  Water or air will be put into your colon to help clean or expand it  This is done so your healthcare provider can see your colon clearly  · Tissue samples may be taken from the walls of your bowel and sent to a lab for tests  If you have a polyp, your healthcare provider will pass a wire loop through the scope and use it to hold the polyp  The polyp is then burned or cut off the wall of your colon  Removed polyps are sent to a lab for tests  Pictures of your colon may be taken during the procedure  The scope will be removed when the procedure is done  What will happen after your colonoscopy:   · Rest after your procedure  You may feel bloated, have some gas and abdominal discomfort  You may need to lie on your right side with a heating pad on your abdomen  You may need to take short walks to help move the gas out  Eat small meals, if you feel bloated  Do not drive or make important decisions until the day after your procedure  · You may have polyps removed  Do not take aspirin or go on long car trips for 7 days after your procedure  Ask your healthcare provider about any other limits after your procedure  Risks of a colonoscopy: You may have pain or bleeding after the scope or polyps are removed  You may also have a slow heartbeat, decreased blood pressure, or increased sweating   Your colon may tear due to the increased pressure from the scope and other instruments  This may cause bowel contents to leak out of your colon and into your abdomen  If this happens, you will need to stay in the hospital and have surgery on your colon  Seek care immediately if:   · You have a large amount of bright red blood in your bowel movements  · Your abdomen is hard and firm and you have severe pain  · You have sudden trouble breathing  Contact your healthcare provider if:   · You develop a rash or hives  · You have a fever within 24 hours of your procedure  · You have not had a bowel movement for 3 days after your procedure  · You have questions or concerns about your condition or care  Activity:   · Do not lift, strain, or run  for 3 days after your procedure  · Rest after your procedure  You have been given medicine to relax you  Do not  drive or make important decisions until the day after your procedure  Return to your normal activity as directed  · Relieve gas and discomfort from bloating  by lying on your right side with a heating pad on your abdomen  You may need to take short walks to help the gas move out  Eat small meals until bloating is relieved  If you had polyps removed: For 7 days after your procedure:  · Do not  take aspirin  · Do not  go on long car rides  Help prevent constipation:   · Eat a variety of healthy foods  Healthy foods include fruit, vegetables, whole-grain breads, low-fat dairy products, beans, lean meat, and fish  Ask if you need to be on a special diet  Your healthcare provider may recommend that you eat high-fiber foods such as cooked beans  Fiber helps you have regular bowel movements  · Drink liquids as directed  Adults should drink between 9 and 13 eight-ounce cups of liquid every day  Ask what amount is best for you  For most people, good liquids to drink are water, juice, and milk  · Exercise as directed  Talk to your healthcare provider about the best exercise plan for you  Exercise can help prevent constipation, decrease your blood pressure and improve your health  Follow up with your healthcare provider as directed:  Write down your questions so you remember to ask them during your visits  © 2017 [employer] [employer] Information is for End User's use only and may not be sold, redistributed or otherwise used for commercial purposes  All illustrations and images included in CareNotes® are the copyrighted property of A D [employer] M [employer]  or [employer]  The above information is an  only  It is not intended as medical advice for individual conditions or treatments  Talk to your doctor, nurse or pharmacist before following any medical regimen to see if it is safe and effective for you

## 2017-12-29 NOTE — OP NOTE
**** GI/ENDOSCOPY REPORT ****     PATIENT NAME: CATALINO LEONG - VISIT ID:  Patient ID: [id number]   YOB: 1970     INTRODUCTION: Esophagogastroduodenoscopy - A 49 male patient presents for   an outpatient Esophagogastroduodenoscopy at [address] [place] [address]  INDICATIONS: GERD  Abdominal pain  Diarrhea  Loss of weight  CT scan with   small bowel thickening  CONSENT: The benefits, risks, and alternatives to the procedure were   discussed and informed consent was obtained from the patient  PREPARATION:  EKG, pulse, pulse oximetry and blood pressure were monitored   throughout the procedure  ASA Classification: Class 3 - Patient has severe   systemic disturbance that may or may not be related to the disorder   requiring surgery  MEDICATIONS: Anesthesia-check records     PROCEDURE:  The endoscope was passed without difficulty through the mouth   under direct visualization and advanced to the 2nd portion of the   duodenum  The scope was withdrawn and the mucosa was carefully examined  Retroflexion was performed  FINDINGS:   Esophagus: The esophagus appeared to be normal   Multiple   random biopsies was taken from the proximal third of the esophagus  GE   junction: There was a small sliding hiatus hernia visible in the GE   junction  Stomach: Gastritis was found in the stomach  Multiple   biopsies was taken  Duodenum: The duodenum appeared to be normal       Jejunum: The jejunum appeared to be normal     Multiple random biopsies   was taken  COMPLICATIONS: There were no complications  IMPRESSIONS: Normal esophagus  Multiple biopsies taken  A hiatus hernia   found  Gastritis found  Multiple biopsies taken  Normal duodenum  Normal   jejunum  Multiple biopsies taken  RECOMMENDATIONS: Follow-up on the results of the biopsy specimens  Anti-reflux measures: Raise the head of the bed 4 to 6 inches  Avoid   smoking   Avoid excess coffee, tea or other caffeinated beverages  Avoid   garments that fit tightly through the abdomen  Avoid eating before bed  Continue current medications  Colonoscopy to follow  ESTIMATED BLOOD LOSS:     PATHOLOGY SPECIMENS: Multiple random biopsies taken from the proximal   third of the esophagus  Multiple biopsies taken  Associated finding:   Gastritis  Multiple random biopsies taken  PROCEDURE CODES:     ICD-9 Codes: 530 81 Esophageal reflux 789 00 Abdominal pain, unspecified   site 787 91 Diarrhea 783 21 Loss of weight 553 3 Diaphragmatic hernia   without mention of obstruction or gangrene 535 50 Unspecified gastritides   and gastroduodenitis, without mention of hemorrhage     ICD-10 Codes: K21 Gastro-esophageal reflux disease R10 Abdominal and   pelvic pain R19 7 Diarrhea, unspecified R63 4 Abnormal weight loss K44   Diaphragmatic hernia K29 Gastritis and duodenitis     PERFORMED BY: WINSTON Oropeza  on 12/29/2017  Version 1, electronically signed by WINSTON Mars  on 12/29/2017   at 14:40

## 2017-12-29 NOTE — OP NOTE
**** GI/ENDOSCOPY REPORT ****     PATIENT NAME: CATALINO LEONG ------ VISIT ID:  Patient ID:   [id number] YOB: 1970     INTRODUCTION: Colonoscopy - A 49 male patient presents for an outpatient   Colonoscopy at [address] [place] [address]  PREVIOUS COLONOSCOPY: None     INDICATIONS: Abdominal pain  Loss of weight  Diarrhea  CONSENT:  The benefits, risks, and alternatives to the procedure were   discussed and informed consent was obtained from the patient  PREPARATION: EKG, pulse, pulse oximetry and blood pressure were monitored   throughout the procedure  The patient was identified by myself both   verbally and by visual inspection of ID band  Airway Assessment   Classification: Airway class 2 - Visualization of the soft palate, fauces   and uvula  ASA Classification: Class 3 - Patient has severe systemic   disturbance that may or may not be related to the disorder requiring   surgery  MEDICATIONS: Anesthesia-check records     PROCEDURE:  The endoscope was passed without difficulty through the anus   under direct visualization and advanced to the terminal ileum  The scope   was withdrawn and the mucosa was carefully examined  The quality of the   preparation was good  Retroflexion was performed  Cecal Intubation Time: 2   minutes(s) Scope Withdrawal Time: 13 minutes(s)     RECTAL EXAM: Normal rectal exam      FINDINGS:  The terminal ileum appeared to be normal  There was evidence of   mild diverticulosis in the sigmoid colon  Multiple random biopsies was   taken from the transverse colon  A single sessile polyp, measuring 6 mm in   size, was found in the rectum  The polyp was removed by cold snare   polypectomy  COMPLICATIONS: There were no complications  IMPRESSIONS: Normal terminal ileum  Mild diverticulosis found in the   sigmoid colon  A single sessile polyp found in the rectum; removed by cold   snare polypectomy       RECOMMENDATIONS: Follow-up on the results of the biopsy specimens  Colonoscopy recommended in 5 years if the polyp is adenomatous  Follow-up   appointment with endoscopist for further workup  ESTIMATED BLOOD LOSS:     PATHOLOGY SPECIMENS: Multiple random biopsies taken from the transverse   colon  Removed by cold snare polypectomy  PROCEDURE CODES:     ICD-9 Codes: 789 00 Abdominal pain, unspecified site 783 21 Loss of weight   787 91 Diarrhea 562 10 Diverticulosis of colon (without mention of   hemorrhage) 211 4 Benign neoplasm of rectum and anal canal     ICD-10 Codes: R10 Abdominal and pelvic pain R63 4 Abnormal weight loss   R19 7 Diarrhea, unspecified K57 Diverticular disease of intestine K63 5   Polyp of colon     PERFORMED BY: WINSTON German  on 12/29/2017  Version 1, electronically signed by WINSTON Layton  on 12/29/2017   at 14:47

## 2017-12-29 NOTE — ANESTHESIA PREPROCEDURE EVALUATION
Review of Systems/Medical History          Cardiovascular  Hypertension poorly controlled, Dysrhythmias, atrial fibrillation,    Pulmonary  Negative pulmonary ROS Smoker cigarette smoker more than 10 packs per year , Tobacco cessation counseling given, ,        GI/Hepatic  Negative GI/hepatic ROS   GERD well controlled,        Kidney disease CKD, Chronic kidney disease stage 3,   Comment: Polycystic kidney disease     Endo/Other  Negative endo/other ROS      GYN       Hematology  Negative hematology ROS      Musculoskeletal  Negative musculoskeletal ROS        Neurology  Seizures poorly controlled,    Comment: Temporal lobe seizures Psychology   Anxiety,            Physical Exam    Airway    Mallampati score: II  TM Distance: >3 FB  Neck ROM: full     Dental       Cardiovascular  Rhythm: regular, Rate: normal, Cardiovascular exam normal    Pulmonary  Pulmonary exam normal Breath sounds clear to auscultation,     Other Findings        Anesthesia Plan  ASA Score- 3     Anesthesia Type- IV sedation with anesthesia with ASA Monitors  Additional Monitors:   Airway Plan:         Plan Factors-    Induction- intravenous  Postoperative Plan-     Informed Consent- Anesthetic plan and risks discussed with patient

## 2018-01-06 ENCOUNTER — GENERIC CONVERSION - ENCOUNTER (OUTPATIENT)
Dept: OTHER | Facility: OTHER | Age: 48
End: 2018-01-06

## 2018-01-09 NOTE — MISCELLANEOUS
Message   Recorded as Task   Date: 05/23/2017 01:14 PM, Created By: Ailyn Kelsey   Task Name: Call Back   Assigned To: Vanessa Nair   Regarding Patient: Izabel Coe, Status: In Progress   Comment:    Vanessa Nair - 23 May 2017 1:14 PM     TASK CREATED  Please call patient regarding polycystic kidney disease   was @ [facility] ER this morning  Thanks! Tristenmaris Gabi - 23 May 2017 6:01 PM     TASK REPLIED TO: Previously Assigned To Galilea Benz  I couldn't reach him but left im a voicemail telling him to call back after 3pm tomorrow and after 1pm Thursday since I am in the office then  I know he wants to switch docs which is ok  I just don't want him to think I'm ignoring his issues  Ailyn Kelsey - 25 May 2017 9:29 AM     TASK IN PROGRESS   [facility] [place] called on 5/25 and Dr Denton Slade spoke with him regarding his concerns  Active Problems    1  Acne vulgaris (706 1) (L70 0)   2  Anxiety (300 00) (F41 9)   3  Atrial fibrillation (427 31) (I48 91)   4  Balance disorder (781 99) (R26 89)   5  Benign essential hypertension (401 1) (I10)   6  Chronic daily headache (784 0) (R51)   7  Chronic kidney disease, stage 3 (585 3) (N18 3)   8  Esophageal reflux (530 81) (K21 9)   9  Gross hematuria (599 71) (R31 0)   10  Hemiparesthesia (782 0) (R20 2)   11  Hypertension (401 9) (I10)   12  Hyponatremia (276 1) (E87 1)   13  Lupus anticoagulant positive (795 79) (R76 0)   14  Memory loss (780 93) (R41 3)   15  Neurocognitive deficits (781 99) (R29 818,R41 89)   16  Oscillopsia (368 15) (H53 19)   17  Osteoarthritis of both knees (715 96) (M17 0)   18  Polycystic kidney (753 12) (Q61 3)   19  Pulmonary nodules (793 19) (R91 8)   20  Transient right leg weakness (729 89) (R29 898)   21  Ulnar neuritis, right (723 4) (G56 21)   22  White matter abnormality on MRI of brain (793 0) (R93 0)    Current Meds   1  Aspirin 81 MG TABS; TAKE 1 TABLET DAILY; Therapy: 35XTK8165 to (Evaluate:13Mar2016); Last Rx:11Krn2545 Ordered   2  Carvedilol 12 5 MG Oral Tablet; two tables twice daily; Therapy: 65PPP5817 to  Requested for: 53Cgt9483 Recorded   3  Venlafaxine HCl ER 37 5 MG Oral Capsule Extended Release 24 Hour; 1 cap daily X 2   weeks then 2 caps  then call MD;   Therapy: 73ZVU2255 to (Evaluate:09Yup2267)  Requested for: 73OCB0979; Last   Rx:22Mar2017 Ordered    Allergies    1   Codeine Sulfate TABS    Signatures   Electronically signed by : Franklin Matias DO; May 25 2017  3:04PM EST                       (Author)

## 2018-01-09 NOTE — MISCELLANEOUS
Message   Recorded as Task   Date: 05/17/2017 11:21 AM, Created By: Frances Edwards   Task Name: Miscellaneous   Assigned To: Frances Edwards   Regarding Patient: Shaye Glasgow, Status: In Progress   Murtaza Martel - 17 May 2017 11:21 AM     TASK CREATED  Pt Sandy Fall called this morning with complaints of left flank pain,pt rates this pain an 8 out of a 10  Pt denies fever, chills,dysuria  [facility] states he recently had a CTA done that you had ordered last Monday  Those results are final and ready for review  Linh [place] - 18 May 2017 5:02 PM     TASK REPLIED TO: Previously Assigned To Linh [place]  I tried to reach him but he did not answer  If he calls back, let me know   Selene Burris - 19 May 2017 11:12 AM     TASK IN PROGRESS   Pt has been advised to report to the ED for evaluation of flank pain  [address] 5/19/2017      Active Problems    1  Acne vulgaris (706 1) (L70 0)   2  Anxiety (300 00) (F41 9)   3  Atrial fibrillation (427 31) (I48 91)   4  Balance disorder (781 99) (R26 89)   5  Benign essential hypertension (401 1) (I10)   6  Chronic daily headache (784 0) (R51)   7  Chronic kidney disease, stage 3 (585 3) (N18 3)   8  Esophageal reflux (530 81) (K21 9)   9  Hemiparesthesia (782 0) (R20 2)   10  Hypertension (401 9) (I10)   11  Hyponatremia (276 1) (E87 1)   12  Lupus anticoagulant positive (795 79) (R76 0)   13  Memory loss (780 93) (R41 3)   14  Neurocognitive deficits (781 99) (R29 818,R41 89)   15  Oscillopsia (368 15) (H53 19)   16  Osteoarthritis of both knees (715 96) (M17 0)   17  Polycystic kidney (753 12) (Q61 3)   18  Pulmonary nodules (793 19) (R91 8)   19  Transient right leg weakness (729 89) (R29 898)   20  Ulnar neuritis, right (723 4) (G56 21)   21  White matter abnormality on MRI of brain (793 0) (R93 0)    Current Meds   1  Aspirin 81 MG TABS; TAKE 1 TABLET DAILY; Therapy: 28ORX0431 to (Evaluate:13Mar2016); Last Rx:62Yov9796 Ordered   2   Carvedilol 12 5 MG Oral Tablet; two tables twice daily; Therapy: 92MJI5261 to  Requested for: 48Rlm4695 Recorded   3  Venlafaxine HCl ER 37 5 MG Oral Capsule Extended Release 24 Hour; 1 cap daily X 2   weeks then 2 caps  then call MD;   Therapy: 86WCN2680 to (Evaluate:65Eqs8314)  Requested for: 36XJW8370; Last   Rx:22Mar2017 Ordered    Allergies    1   Codeine Sulfate TABS    Signatures   Electronically signed by : Malia García DO; May 19 2017  6:50PM EST                       (Author)

## 2018-01-10 NOTE — RESULT NOTES
Verified Results  * MRA HEAD WO CONTRAST [id number] 09:17PM Antonia Espinoza     Test Name Result Flag Reference   MRA HEAD WO CONTRAST (Report)     This is a summary report  The complete report is available in the patient's medical record  If you cannot access the medical record, please contact the sending organization for a detailed fax or copy  MRA BRAIN     INDICATION: 50-year-old male, post concussive headaches, polycystic kidney disease   COMPARISON: MRA of the head performed 3/5/2010     TECHNIQUE: Axial 3-D time-of-flight imaging with 3-D reconstructions  FINDINGS:     IMAGE QUALITY: Diagnostic  ANATOMY     INTERNAL CAROTID ARTERIES: Normal flow related enhancement of the distal cervical, petrous and cavernous segments of the internal carotid arteries  Normal ICA terminus  ANTERIOR CIRCULATION: Normal A1 segments  Normal anterior communicating artery  Normal flow-related enhancement of the anterior cerebral arteries  MIDDLE CEREBRAL ARTERY CIRCULATION: The M1 segment and middle cerebral artery branches demonstrate normal flow-related enhancement  DISTAL VERTEBRAL ARTERIES: Distal vertebral arteries are patient with a normal vertebrobasilar junction  The posterior inferior cerebellar artery origins are normal       BASILAR ARTERY: Normal      POSTERIOR CEREBRAL ARTERIES: Both posterior cerebral arteries arises from the basilar tip  Both arteries demonstrate normal flow-related enhancement  Normal posterior communicating arteries  IMPRESSION:     Normal examination, unchanged compared with prior MRA study       Workstation performed: [id number]     Signed by:   Estuardo West MD   3/31/16       Discussion/Summary   Study is normal, no signs of aneurysm       Signatures   Electronically signed by : Jake Wood MD; Apr 1 2016 10:34AM EST                       (Author)

## 2018-01-10 NOTE — RESULT NOTES
Verified Results  (1) OXCARBAZEPINE ( TRILEPTAL) [id number] 11:59AM Davie Sandifer     Test Name Result Flag Reference   OXC 23 ug/mL  10 - 35   Detection Limit = 1  Performed at:  [address]  [id number]  : Bijal Lassiter MD, Phone:  [phone]

## 2018-01-10 NOTE — RESULT NOTES
Verified Results  (1) CRYOGLOBULIN [id number] 03:35PM [facility] Serum Order Number: [id number]     Test Name Result Flag Reference   CRYOGLOBULIN Positive A None detected   Performed at:  [address] [place] [address]  [id number]  : Van Mari MD, Phone:  [phone]       Discussion/Summary   The test for cryoglobulins (proteins that tend to thicken up and coagulate as the temperature goes down) was qualitatively positive  This does not tell me what type of cryoglobulins there are (there are a few types), but they can be indicative of autoimmune disease  I would suggest that you review this result with her primary care physician and consider additional more specific testing and/or potential referral to rheumatology       Signatures   Electronically signed by : Jacob Reno MD; Jun 30 2016  9:24PM EST                       (Author)

## 2018-01-10 NOTE — PROCEDURES
Procedures by Mary Ann Malin MD at  2016  2:21 PM      Author:  Mary Ann Malin MD Service:  Neurology Author Type:  Physician     Filed:  2016  2:34 PM Date of Service:  2016  2:21 PM Status:  Signed     :  Mary Ann Malin MD (Physician)            Continuous Video EEG  Epilepsy Monitoring Unit    Patient Name:  Jacob Mari  MRN: [id number]   :  1970 File #: EMU 16-80   Age: 54 y o  Encounter #: [id number]   Date performed: -2016 Referring Provider: Mayr Ann Malin MD          Report date: 2016          Study type: Continuous video EEG, up to 24 hours    ICD 10 diagnosis: Transient alteration of awareness R40 4 and Other amnesia R41 3    Start time: 2016 12:31  End time: 2016 08:00    Patient History:  Patient is 54 y o  male admitted to the epilepsy monitoring unit to determine  whether spells of disorientation are seizures  He has a history of multiple concussions when he was younger; he complains of increasing forgetfulness, word finding difficulty, difficulty following conversations, and misplacing objects  Current AEDs: Oxcarbazepine 600-600  Medications include:   aspirin 81 mg Oral Daily   carvedilol 25 mg Oral BID   escitalopram 20 mg Oral Daily   nicotine 1 patch Transdermal Daily   OXcarbazepine 600 mg Oral Q12H [address]       Description of Procedure:   A 24 hours continuous video EEG was performed with electrodes applied using the International 10-20 System at least 16 channels are reviewed and formatted into longitudinal bipolar, transverse bipolar, and referential (to common reference  or calculated common reference) montages  Additional electrodes used included T1, T2, and extraocular electrodes, and ECG, along with video recording  The EEG was recorded with the patient awake, drowsy, and asleep state  A monitoring technologist superivised the continuous recording  The recording was technically satisfactory          Findings: Background Activity: The background is symmetric with respect to voltages and activity  During wakefulness, the background is well-organized  with anterior low amplitude beta activity and low-moderate amplitude posterior alpha activity  There is a symmetric 10-10 5  Hz posterior dominant rhythm that attenuates with eye opening  Drowsiness is characterized by attenuation of the alpha rhythm, prominence of anterior beta, central theta activity, presence of vertex waves, and positive occipital sharp transients of sleep (POSTS)  Stage 2 sleep is characterized by symmetric sleep spindles and K-complexes  Slow wave sleep and REM sleep are captured  There are occasional left F7 maximal sharp transients  Abnormal findings: There is intermittent polymorphic 1-2 Hz moderate voltage delta activity over the bilateral temporal regions, left greater than right  Other findings: The single lead ECG shows a regular sinus cardiac rhythm  Events:   Event - reported later being disoriented  17:17 - patient mentioned to the nurse that he felt disoriented when his in laws were in his room, he felt that he did not recognize them initially  I reviewed the EEG from 16:38 to 17:16 when there were visitors; no significant change to the background EEG was present; there is excessive EMG artifact over the midtemporal regions  On video assessment, there is no clear point when he was disoriented  or having a difficult time conversing with his visitors  02:56 - 03:05 - Event of disorientation, not sure how he got there  Patient is awake with eyes closed (awake EEG background), he opens his eyes and looks around  About a minute later, someone is at his door and ask if he is okay  Patient ask where is he; he reports that he does not remember where he is or how he got there  He appears confused but states that he is okay  Nurse enters the room and introduces himself to start ictal testing   Patient is slow to respond including giving his name  He does report that he is in a hospital and follow commands but he is not able to  retain the code phrase  He endorses feeling lost    There is no significant change to the awake EEG; no rhythmic or ictal activity is present  Interpretation: This is an abnormal 2 0 hours continuous video EEG recording due to bilateral left greater then right focal slowing over the temporal regions  This finding indicates focal cerebral dysfunction over the temporal regions, left worse than right, possibly structural in origin  Left temporal sharp transients are of unknown clinical significance  The event of being confused / disoriented is not associated with ictal changes to the awake EEG and is likely nonepileptic in origin  During the period the patient was visited by family/friends it was not readily apparent that he appeared to be disoriented despite his reporting the symptom in hindsight, no abnormal rhythmical or ictal EEG activity is present  Glinda Kern, MD [facility]  Arnoldo JOHNSON    Jul 12 2016  2:33PM [facility] Standard Time

## 2018-01-10 NOTE — MISCELLANEOUS
Message   Recorded as Task   Date: 05/22/2017 08:24 AM, Created By: Aster Nguyễn   Task Name: Call Back   Assigned To: Lucy Galicia   Regarding Patient: Janet Bobby, Status: Active   Comment:    Cary Mccormack - 22 May 2017 8:24 AM     TASK CREATED  Caller: Self; Results Inquiry; [phone] (Home)  req that Dr Olivier Shahid would look at the CT scan that was done on 5 10 17 at [facility]), ordered by Dr Saint Bali & discuss the results with him b/c he is concerned  He just wants someone to take a second look  Req a call back at [address] [place] [address] - 22 May 2017 8:35 AM     TASK EDITED  I spoke with patient  I will have Dr Olivier Shahid review upon his return in one week  patient aware   Lucy Galicia - 22 May 2017 8:35 AM     TASK Josesito Weaver - 22 May 2017 8:40 AM     TASK REACTIVATED   Aster Nguyễn - 22 May 2017 8:41 AM     TASK REASSIGNED: Previously Assigned To Lucy Galicia  Rec'vd cll from pt wanting to know if another oncologist could look his scan since Dr Olivier Shahid is away? Lucy Galicia - 22 May 2017 8:48 AM     TASK EDITED  spoke with patient  his nephrologist recommended he go to ED today for increased kidney pain  i instructed him to follow the nephrologist's advise        Active Problems    1  Acne vulgaris (706 1) (L70 0)   2  Anxiety (300 00) (F41 9)   3  Atrial fibrillation (427 31) (I48 91)   4  Balance disorder (781 99) (R26 89)   5  Benign essential hypertension (401 1) (I10)   6  Chronic daily headache (784 0) (R51)   7  Chronic kidney disease, stage 3 (585 3) (N18 3)   8  Esophageal reflux (530 81) (K21 9)   9  Hemiparesthesia (782 0) (R20 2)   10  Hypertension (401 9) (I10)   11  Hyponatremia (276 1) (E87 1)   12  Lupus anticoagulant positive (795 79) (R76 0)   13  Memory loss (780 93) (R41 3)   14  Neurocognitive deficits (781 99) (R29 818,R41 89)   15  Oscillopsia (368 15) (H53 19)   16  Osteoarthritis of both knees (715 96) (M17 0)   17   Polycystic kidney (753 12) (Q61 3)   18  Pulmonary nodules (793 19) (R91 8)   19  Transient right leg weakness (729 89) (R29 898)   20  Ulnar neuritis, right (723 4) (G56 21)   21  White matter abnormality on MRI of brain (793 0) (R93 0)    Current Meds   1  Aspirin 81 MG TABS; TAKE 1 TABLET DAILY; Therapy: 06LPI5513 to (Evaluate:13Mar2016); Last Rx:25Ryl0182 Ordered   2  Carvedilol 12 5 MG Oral Tablet; two tables twice daily; Therapy: 49UHA0003 to  Requested for: 14Apr2017 Recorded   3  Venlafaxine HCl ER 37 5 MG Oral Capsule Extended Release 24 Hour; 1 cap daily X 2   weeks then 2 caps  then call MD;   Therapy: 01AIF0808 to (Evaluate:16Yva9713)  Requested for: 61RGD0565; Last   Rx:22Mar2017 Ordered    Allergies    1   Codeine Sulfate TABS    Signatures   Electronically signed by : Jadiel Garcia, ; May 22 2017  8:48AM EST                       (Author)

## 2018-01-10 NOTE — PROCEDURES
Procedures by Bentley Day MD at  2016  5:04 PM      Author:  Bentley Day MD Service:  Neurology Author Type:  Physician     Filed:  2016  5:10 PM Date of Service:  2016  5:04 PM Status:  Signed     :  Bentley Day MD (Physician)            Continuous Video EEG  Epilepsy Monitoring Unit    Patient Name:  Arcadio Hill  MRN: [id number]   :  1970 File #: EMU 14-80   Age: 54 y o  Encounter #: [id number]   Date performed: 2016 Referring Provider: Bentley Day MD          Report date: 2016          Study type: Continuous video EEG, up to 24 hours    ICD 10 diagnosis: Transient alteration of awareness R40 4 and Other amnesia R41 3    Start time: 2016 08:01  End time: 2016 13:30    Patient History:  Patient is 54 y o  male admitted to the epilepsy monitoring unit to determine  whether spells of disorientation are seizures  He has a history of multiple concussions when he was younger; he complains of increasing forgetfulness, word finding difficulty, difficulty following conversations, and misplacing objects  Current AEDs: Oxcarbazepine 300-300  Medications include:     Description of Procedure:   A 24 hours continuous video EEG was performed with electrodes applied using the International 10-20 System at least 16 channels are reviewed and formatted into longitudinal bipolar, transverse bipolar, and referential (to common reference  or calculated common reference) montages  Additional electrodes used included T1, T2, and extraocular electrodes, and ECG, along with video recording  The EEG was recorded with the patient awake, drowsy, and asleep state  A monitoring technologist superivised the continuous recording  The recording was technically satisfactory  Findings:   Background Activity: The background is symmetric with respect to voltages and activity    During wakefulness, the background is well-organized  with anterior low amplitude beta activity and low-moderate amplitude posterior alpha activity  There is a symmetric 10-10 5  Hz posterior dominant rhythm that attenuates with eye opening  There are occasional left F7 maximal sharp transients  Abnormal findings: There is intermittent polymorphic 1-2 Hz moderate voltage delta activity over the left temporal region  The right temporal focal slowing is less conspicious  Other findings: The single lead ECG shows a regular sinus cardiac rhythm  Events: There are no patient push button events  Interpretation: This is an abnormal  5 5 hours continuous video EEG recording due to bilateral left greater then right focal slowing over the temporal regions  This finding indicates focal cerebral dysfunction over the temporal regions, left worse than right, possibly structural in origin  Left temporal sharp transients are of unknown clinical significance  Collin Cooks, MD  [address] [place] [address] [facility]  Len JOHNSON    Jul 13 2016  5:09PM [facility] Standard Time

## 2018-01-10 NOTE — MISCELLANEOUS
Message  I spoke with the patient earlier today regarding his episodes of gross hematuria  He is concerned about cancer  He says he has left flank pain and worries about his cysts  He agrees to a urology consult  I recommend he also see his cardiologist regarding possible need for anticoagulation in the setting of +lupus anticoagulant  He is most worried about the increased frequency of his gross hematuria over the past month  I reassured him and recommend urologic evaluation  I again emphasize that if he has recurrent hematuria he should go to the ER as that may indicate a hemorrhagic cyst or cyst rupture in light of PCKD  He showed understanding  Plan  Gross hematuria, Polycystic kidney    · *1 -  [facility]  This patient has a history of PCKD  and has had recurrent hematuria  Per patient request to see urology regarding this  He is  concerned about complex cysts and RCC in light of hematuria    Status: Active   Requested for: 19TZR9629  Care Summary provided  : Yes    Signatures   Electronically signed by : Robbie Angeles DO; May 24 2017  6:03PM EST                       (Author)

## 2018-01-10 NOTE — RESULT NOTES
Verified Results  * MRI BRAIN SEIZURE WO AND W CONTRAST 31TYE5731 09:22PM [facility] Order Number: [id number]   Performing Comments: With epilepsy protocol, thin cuts through the temporal lobes, and saggital flair   - Patient Instructions: To schedule this appointment, please contact Central Scheduling at [phone]  Test Name Result Flag Reference   MRI BRAIN SEIZURE WO AND W CONTRAST (Report)     MRI BRAIN - WITH AND WITHOUT CONTRAST     INDICATION: 50-year-old male, seizure disorder, multiple prior concussions, abnormal EEG   COMPARISON: 12/17/2015 MRI     TECHNIQUE: Sagittal 3D SPGR, axial T2, axial FLAIR, axial T1, axial Gradient, coronal T2 and FLAIR  Post-contrast axial T1 , Sagittal 3D SPGR with coronal recons  6 mL of Gadavist was injected intravenously without immediate consequence  IMAGE QUALITY:  Diagnostic  FINDINGS:     BRAIN PARENCHYMA:    Single tiny T2 and FLAIR hyperintense focus is present within the subcortical white matter of the lateral posterior right frontal lobe, essentially unchanged  It was not visible on prior MRI from 9/11/2009  This focus is suspicious for mild chronic    microangiopathic change which may accompany migraine headaches  Solitary lesion of MS or Lyme disease could appear similarly  Continued follow-up as clinically appropriate  The lesion does not enhance  There is no discrete mass, mass effect or midline shift  Brainstem and cerebellum demonstrate normal signal  Diffusion imaging is unremarkable  There is mild dilatation of the temporal horn of the right lateral ventricle which appears unchanged dating back through 9/11/2009  No abnormal signal intensity is present within the hippocampus  The hippocampus exhibits normal morphology  This    finding is of uncertain clinical significance  Correlate with EEG findings  Postcontrast imaging of the brain demonstrates no abnormal enhancement       VENTRICLES: Normal      SELLA AND PITUITARY GLAND: Normal      ORBITS: Normal      PARANASAL SINUSES: Normal      VASCULATURE: Evaluation of the major intracranial vasculature demonstrates appropriate flow voids  CALVARIUM AND SKULL BASE: Normal      EXTRACRANIAL SOFT TISSUES: Normal    Slight enlargement of benign-appearing cystic lesion measuring approximately 12 mm x 15 mm within the subcutaneous adipose tissues lateral to the posterior right mandible, likely represent sebaceous cyst       IMPRESSION:   Solitary focus of T2 and FLAIR hyperintensity involving subcortical white matter of the posterolateral right frontal lobe, unchanged compared with 12/17/2015  This finding was not evident 9/11/2009  It may represent microangiopathic change associated    with migraine headaches  Continued follow-up as appropriate     Mild nonspecific dilatation of the right temporal horn without evidence of abnormal hippocampal signal intensity or morphology     No enhancing pathology identified       Slight enlargement of suspected right lateral facial subcutaneous sebaceous cyst       Workstation performed: [id number]     Signed by:   Alie Zhou MD   5/3/16       Discussion/Summary   No clear change on this MRI  I will ask our epileptologists to eval further but as of now no specific change in therapy       Signatures   Electronically signed by : Cy Altman MD; May  7 2016  8:22PM EST                       (Author)

## 2018-01-12 VITALS
HEIGHT: 68 IN | RESPIRATION RATE: 18 BRPM | DIASTOLIC BLOOD PRESSURE: 86 MMHG | HEART RATE: 81 BPM | SYSTOLIC BLOOD PRESSURE: 124 MMHG | OXYGEN SATURATION: 98 % | BODY MASS INDEX: 20.79 KG/M2 | WEIGHT: 137.19 LBS

## 2018-01-12 VITALS
BODY MASS INDEX: 20.63 KG/M2 | WEIGHT: 136.13 LBS | HEIGHT: 68 IN | SYSTOLIC BLOOD PRESSURE: 124 MMHG | RESPIRATION RATE: 16 BRPM | DIASTOLIC BLOOD PRESSURE: 88 MMHG | HEART RATE: 58 BPM

## 2018-01-12 VITALS
DIASTOLIC BLOOD PRESSURE: 84 MMHG | HEART RATE: 76 BPM | SYSTOLIC BLOOD PRESSURE: 130 MMHG | WEIGHT: 145.25 LBS | HEIGHT: 68 IN | OXYGEN SATURATION: 96 % | BODY MASS INDEX: 22.01 KG/M2

## 2018-01-12 VITALS
HEIGHT: 68 IN | BODY MASS INDEX: 22.28 KG/M2 | WEIGHT: 147 LBS | SYSTOLIC BLOOD PRESSURE: 160 MMHG | HEART RATE: 84 BPM | DIASTOLIC BLOOD PRESSURE: 100 MMHG

## 2018-01-12 VITALS
BODY MASS INDEX: 20.76 KG/M2 | HEIGHT: 68 IN | HEART RATE: 76 BPM | SYSTOLIC BLOOD PRESSURE: 168 MMHG | WEIGHT: 137 LBS | DIASTOLIC BLOOD PRESSURE: 100 MMHG

## 2018-01-12 NOTE — MISCELLANEOUS
Message   Recorded as Task   Date: 09/21/2017 11:56 AM, Created By: Cate Leiva   Task Name: Care Coordination   Assigned To: [id number] [address] clinical,Team   Regarding Patient: Gokul Lilly, Status: Active   Comment:    Miya Reagan - 21 Sep 2017 11:56 AM     TASK CREATED  81 mg aspirin hold request faxed to Dr Opal Chaney [facility], Cardiology) at [phone]  Patient was not able to wait; instructions were not reviewed or provided to patient  Once approval rec'd, please schedule:     right KAMINI  4w f/u with NP   [facility] - 27 Sep 2017 4:21 PM     TASK EDITED   Miya Reagan - 29 Sep 2017 7:31 AM     TASK EDITED  ASA hold rec'd from cardiology and scanned into chart  [facility] - 15 Oct 2017 11:33 AM     TASK REASSIGNED: Previously Assigned To Kayce Guerrier - 97 Oct 2017 12:49 PM     TASK EDITED  [facility] on home/cell # for pt to C/B, C/B # provided  [facility] - 05 Oct 2017 11:31 AM     TASK EDITED  S/W pt   Scheduled pt for right C7-T1 KAMINI w/ JE on 10/25/17 at 8:45  Pt instructed to stop the aspirin on 10/19/17 and last dose is 10/18/17  Pt denies taking other anticoagulants and NSAIDS  Pt vebalized understanding of preoperative instructions and wrote them down and pt will contact [facility] if he gets sick or starts antibiotics  Scheduled pt for SOVS on 11/22/17 at 9:00 w/ DG for F/U  Esau Lara - 05 Oct 2017 11:31 AM     TASK REPLIED TO: Previously Assigned To Vince Oswald                  aware agree        Active Problems    1  Acne vulgaris (706 1) (L70 0)   2  Antiphospholipid antibody syndrome (289 81) (D68 61)   3  Anxiety (300 00) (F41 9)   4  Atrial fibrillation (427 31) (I48 91)   5  Balance disorder (781 99) (R26 89)   6  Benign essential hypertension (401 1) (I10)   7  Bilateral hip pain (719 45) (M25 551,M25 552)   8  Cervical disc disease (722 91) (M50 90)   9  Cervical neuritis (723 4) (M54 12)   10  Chronic daily headache (784 0) (R51)   11   Chronic kidney disease, stage 3 (585  3) (N18 3)   12  Chronic nausea (787 02) (R11 0)   13  Esophageal reflux (530 81) (K21 9)   14  Gross hematuria (599 71) (R31 0)   15  Hand pain (729 5) (M79 643)   16  Hemiparesthesia (782 0) (R20 2)   17  Hypertension (401 9) (I10)   18  Hyponatremia (276 1) (E87 1)   19  Inflammatory arthropathy (714 9) (M19 90)   20  Irritable bowel syndrome with diarrhea (564 1) (K58 0)   21  Kidney cysts (753 10) (N28 1)   22  Left nephrolithiasis (592 0) (N20 0)   23  Lumbar radiculopathy (724 4) (M54 16)   24  Lupus anticoagulant positive (795 79) (R76 0)   25  Memory loss (780 93) (R41 3)   26  Nausea (787 02) (R11 0)   27  Nephrolithiasis (592 0) (N20 0)   28  Neurocognitive deficits (781 99) (R29 818,R41 89)   29  Oscillopsia (368 15) (H53 19)   30  Osteoarthritis of both knees (715 96) (M17 0)   31  Polycystic kidney (753 12) (Q61 3)   32  Pulmonary nodules (793 19) (R91 8)   33  Transient right leg weakness (729 89) (R29 898)   34  Ulnar neuritis, right (723 4) (G56 21)   35  White matter abnormality on MRI of brain (793 0) (R93 0)    Current Meds   1  Aspirin 81 MG TABS; TAKE 1 TABLET DAILY; Therapy: 14QIW3788 to (Evaluate:13Mar2016); Last Rx:86Ghy3017 Ordered   2  Carvedilol 12 5 MG Oral Tablet; two tables twice daily; Therapy: 83JPR2823 to  Requested for: 14Apr2017 Recorded   3  Chlorthalidone 25 MG Oral Tablet; take 1 capsule by mouth daily; Therapy: 01Rln2953 to (Evaluate:98Ged2276)  Requested for: 34Szh9150; Last   Rx:89Dwt6792 Ordered   4  NIFEdipine ER 30 MG Oral Tablet Extended Release 24 Hour; Take 1 tablet twice daily; Therapy: 51IAK6250 to ([facility])  Requested for: 29DNH0510; Last   Rx:13Png0589 Ordered   5  Potassium Chloride ER 10 MEQ Oral Tablet Extended Release; TAKE 1 TABLET DAILY; Therapy: 28AHB4620 to (UQCEJTE:18JLE5993)  Requested for: 21WEV8501; Last   Rx:45Osc5777 Ordered   6  Xanax XR 1 MG Oral Tablet Extended Release 24 Hour (ALPRAZolam ER); One po hs;    Therapy: 02QTB4552 to Recorded    Allergies    1   Codeine Sulfate TABS    Signatures   Electronically signed by : Liat Mera, ; Oct  5 2017 11:33AM EST                       (Author)

## 2018-01-12 NOTE — MISCELLANEOUS
Provider Comments  Provider Comments:   No show for 45 min EDX study        Signatures   Electronically signed by : Truong Edmonds DO; Jul 13 2016  8:53AM EST                       (Author)

## 2018-01-12 NOTE — RESULT NOTES
Verified Results  (1) SED RATE 08Jun2016 03:35PM [facility] Order Number: [id number]     Test Name Result Flag Reference   SED RATE 15 mm/hour H 0-10     (1) C-REACTIVE PROTEIN 29ZBV6917 03:35PM [facility] Order Number: [id number]  TW Order Number: [id number] Order Number: [id number] Order Number: [id number] Order Number: [id number] Order Number: [id number]     Test Name Result Flag Reference   C-REACT PROTEIN 4 6 mg/L H <3 0     (1) TSH WITH FT4 REFLEX 78FAN1345 03:35PM [facility] Major Order Number: [id number]  TW Order Number: [id number] Order Number: [id number] Order Number: [id number] Order Number: [id number] Order Number: [id number]     Test Name Result Flag Reference   TSH 1 310 uIU/mL  0 358-3 740     (1) VITAMIN B12 23JEQ5786 03:35PM [facility] Major Order Number: [id number]  TW Order Number: [id number] Order Number: [id number] Order Number: [id number] Order Number: [id number] Order Number: [id number]     Test Name Result Flag Reference   VITAMIN B12 388 pg/mL  100-900     (1) FOLATE 69VML6414 03:35PM [facility] Major Order Number: [id number]  TW Order Number: [id number] Order Number: [id number] Order Number: [id number] Order Number: [id number] Order Number: [id number]     Test Name Result Flag Reference   FOLATE 12 7 ng/mL  3 1-17 5       Discussion/Summary   Initial labs here looks good, no real inflammation (very minimal elevation of non-specific markers of inflammation)  Continue current course       Signatures   Electronically signed by : Jacqueline Garg MD; Jun 9 2016  3:13PM EST                       (Co-author)

## 2018-01-12 NOTE — PROGRESS NOTES
Assessment    1  Chronic kidney disease, stage 3 (585 3) (N18 3)   2  Hypertension (401 9) (I10)   3  Polycystic kidney (753 12) (Q61 3)    Plan  Chronic kidney disease, stage 3, Hypertension, Polycystic kidney    · (1) PHOSPHORUS; Status:Active; Requested for:08Mar2017;    Perform:[facility]; NIX:[id number]; Ordered; For:Chronic kidney disease, stage 3, Hypertension, Polycystic kidney; Ordered By:Galilea Benz;   · (1) PTH N-TERMINAL (INTACT); Status:Active; Requested for:08Mar2017;    Perform:[facility]; MYZ:[id number]; Ordered; For:Chronic kidney disease, stage 3, Hypertension, Polycystic kidney; Ordered By:Galilea Benz;  Chronic kidney disease, stage 3, Hyponatremia    · (1) BASIC METABOLIC PROFILE; Status:Active; Requested for:08Mar2017;    Perform:[facility]; RXL:[id number]; Ordered; For:Chronic kidney disease, stage 3, Hyponatremia; Ordered By:Galilea Benz;  Polycystic kidney    · (1) CBC/ PLT (NO DIFF); Status:Active; Requested for:08Mar2017;    Perform:[facility]; VDA:[id number]; Ordered; For:Polycystic kidney; Ordered By:Galilea Benz;   · CT ABDOMEN WO CONTRAST; Status:Need Information - Financial Authorization; Requested for:08Mar2017;    Perform:[facility]; Order Comments: Follow up CT as patient having lower back pain, concern for cyst rupture vs other cause; Due:08Mar2018; Ordered; For:Polycystic kidney; Ordered By:Galilea Benz;    Discussion/Summary    1  polycystic kidney disease /CKD stage II-III- monitor sCr q4 months  No recent bloodwork   -check BMP now as well as phos and PTH levels  -of note, MRA in March 2016 negative for aneurysm  -will get CT of abdomen for further evaluation of "kidney pain", also in light of elevated Hgb, will also recheck CBC    2  hyponatremia - sNa 135 on last bloodwork  Recheck BMP now    3  Hypertension - well controlled on carvedilol 12 5mg po BID, continue current dose  RTC in 6 months        Reason For Visit  CKD, PCKD      History of Present Illness  52 yo M with hx of CKD, PCKD, HTN here for CKD follow up  Last seen by me October 6, 2016  Today, he states everything is fine  He states he has "pain in his right kidney"  He states he feels as if something is catching  Sporadically uses NSAIDs for bad headaches  No recent hospitalizations  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  He is told he has lupus  Follows with neurology  Also sees rheumatology regarding lupus  He is also told he has afib  Review of Systems    Constitutional: no fever, no chills, no recent weight gain and no recent weight loss  Integumentary: no rashes  Gastrointestinal: constipation, nausea, diarrhea, +hx of IBS and vomiting, but no abdominal pain  Respiratory: shortness of breath, but no cough  Cardiovascular: no chest pain and no lower extremity edema  Musculoskeletal: joint pain and joint swelling  Neurological: headache, but no lightheadedness and no dizziness  Genitourinary: foamy urine, but no dysuria and no hematuria  Eyes: eyesight problems  ENT: no hearing loss  Psychiatric: anxiety, but no depression  ROS reviewed  Past Medical History    The active problems and past medical history were reviewed and updated today  Surgical History    The surgical history was reviewed and updated today  Family History    The family history was reviewed and updated today  Social History  The social history was reviewed and updated today  The social history was reviewed and is unchanged  Current Meds   1  Aspirin 81 MG TABS; TAKE 1 TABLET DAILY; Therapy: [id number] to (Evaluate:13Mar2016); Last Rx:[id number] Ordered   2  Carvedilol 12 5 MG Oral Tablet; TAKE ONE TABLET BY MOUTH 2 TIMES A DAY; Therapy: [id number] to (Last Rx:[id number])  Requested for: 30Sep2016 Ordered    The medication list was reviewed and updated today  Allergies    1   Codeine Sulfate TABS    Vitals  Vital Signs    Recorded: [id number] 03:26PM Recorded: 64SCI9104 03:24PM   Heart Rate 88    Systolic 115, RUE, Sitting    Diastolic 80, RUE, Sitting    Height  5 ft 8 in   Weight  146 lb 4 00 oz   BMI Calculated  22 24   BSA Calculated  1 79     Physical Exam    Constitutional: General appearance: No acute distress, well appearing and well nourished  ENT: External ears and nose appear normal      Eyes: Anicteric sclerae  Pulmonary: Respiratory effort: No increased work of breathing or signs of respiratory distress  Auscultation of lungs: Clear to auscultation  Cardiovascular: Auscultation of heart: Normal rate and rhythm, normal S1 and S2, without murmurs  Abdomen: Non-tender, no masses  Extremities: No cyanosis, clubbing or edema  Rash: No rash present  Neurologic: Non Focal      Psychiatric: Orientation to person, place, and time: Normal   and Mood and affect: Normal        Future Appointments    Date/Time Provider Specialty Site   03/20/2017 08:30 AM Salo Bhagat MD Neurology [place] [id number]   03/14/2017 09:40 AM WINSTON Mike   Internal Medicine [facility]   04/14/2017 10:00 AM Hemant Weeks DO Cardiology  CARDIOLOGY  [facility]   03/21/2017 01:40 PM Edmundo Cui MD Pulmonary Medicine [facility]     Signatures   Electronically signed by : Deborra Crigler, DO; Mar  8 2017  3:54PM EST                       (Author)

## 2018-01-12 NOTE — RESULT NOTES
Verified Results  (1) RPR [id number] 03:35PM [facility] Order Number: [id number]     Test Name Result Flag Reference   [address]     (1) HEP B CORE AB, TOTAL [id number] 03:35PM [facility] Order Number: [id number]  TW Order Number: [id number]     Test Name Result Flag Reference   HEPATITIS B CORE TOTAL ANTIBODY Non-reactive  Non-reactive     (1) HEP B SURFACE ANTIBODY 08Jun2016 03:35PM [facility] Order Number: [id number]  TW Order Number: [id number]     Test Name Result Flag Reference   HEPATITIS B SURFACE ANTIBODY <3 10 mIU/mL     Protective Immunity: Hep B Surface Antibody >= 10 mIu/ml (Traceable to [facility] Reference Preparation)     (1) HEP B SURFACE ANTIGEN 08Jun2016 03:35PM [facility] Order Number: [id number]  TW Order Number: [id number]     Test Name Result Flag Reference   HEPATITIS B SURFACE ANTIGEN Non-reactive  Non-reactive, NonReactive - Confirmed       Discussion/Summary   Normal values, no evidence of hepatitis or syphilis       Signatures   Electronically signed by : Kenna Morales MD; Romeo 10 2016  4:29PM EST                       (Author)

## 2018-01-12 NOTE — PROGRESS NOTES
Assessment    1  Antiphospholipid antibody syndrome (289 81) (D68 61)   2  Lupus anticoagulant positive (795 79) (R76 0)   3  Chronic kidney disease, stage 3 (585 3) (N18 3)   4  Polycystic kidney (753 12) (Q61 3)   5  Hypertension (401 9) (I10)   6  Esophageal reflux (530 81) (K21 9)   7  Atrial fibrillation (427 31) (I48 91)   8  Benign essential hypertension (401 1) (I10)    Discussion/Summary    1  Positive Lupus anticoagulant- No clots or CVA currently, positive on 2 separate tests  On ASA, considering anticoagulant- currently working with heme/onc on this, will defer decision to them  Patient is able to Self-Care  Counseling  Rheumatology Counseling Documentation: The patient was counseled regarding diagnostic results, instructions for management, risk factor reductions, prognosis, patient and family education, impressions, risks and benefits of treatment options and importance of compliance with treatment  total time of encounter was 35 minutes and 25 minutes was spent counseling  Chief Complaint  Pt here for evaluation of positive Lupus Anticoagulant      History of Present Illness  Pt presents at referral of Hem Onc for evaluation of + Lupus anticoagulant  He is currently on ASA and Coreg for afib  He also notes bleeding in his urine for which he follows up Urology, and a bout of colitis 2 years ago that resolved, but has intermittent diarrhea- no blood  Denies nosebleeds, blood clots, DVTs, no strokes  Strong family hx of CVA  Has been having memory changes and vision changes, with some right sided weakness changes per his neurologist- states is being worked up for ALS vs  MS, with temporal lobe epilepsy  Also complains of finger/hand swelling and knee swelling, worse in am, improves within an hour or so  Review of Systems    Constitutional: chills, but no fever, no chills, no recent weight gain, no recent weight loss, no complaints of feeling tired, no anorexia  , no fever, no recent weight gain and no recent weight loss  HEENT: blurred vision, but no dryness of the eyes, no hearing loss, no dryness mouth and no mouth sores  Cardiovascular:  no chest pain or pressure, no RICO, no palpitations, no syncope, no orthopnea, no edema  Respiratory:  no cough, no sputum, no shortness of breath, no pleurisy, no hemoptysis, no wheezing  Gastrointestinal: no abdominal pain, no heartburn, no diarrhea, no constipation and no melena   no abdominal pain, no nausea, no vomiting,no hematemesis, no dysphagia, no odynophagia, no heartburn, no diarrhea, no constipation, no melena, no BRBPR  Genitourinary: as noted in HPI  Musculoskeletal: as noted in HPI  Integumentary no rash, no Raynaud's and no photosensitivity  Endocrine heat or cold intolerance  Hematologic/Lymphatic:  no unusual bleeding, no tendency for easy bruising, no clotting skin or lumps  Neurological: headache, vertigo or dizziness and tingling, but as noted in HPI  Psychiatric: no insomnia, no depression and no anxiety   no insomnia, no non-restorative sleep, no depression, no anxiety  ROS reviewed  Active Problems    1  Acne vulgaris (706 1) (L70 0)   2  Anxiety (300 00) (F41 9)   3  Atrial fibrillation (427 31) (I48 91)   4  Balance disorder (781 99) (R26 89)   5  Benign essential hypertension (401 1) (I10)   6  Chronic daily headache (784 0) (R51)   7  Chronic kidney disease, stage 3 (585 3) (N18 3)   8  Esophageal reflux (530 81) (K21 9)   9  Gross hematuria (599 71) (R31 0)   10  Hemiparesthesia (782 0) (R20 2)   11  Hypertension (401 9) (I10)   12  Hyponatremia (276 1) (E87 1)   13  Kidney cysts (753 10) (N28 1)   14  Left nephrolithiasis (592 0) (N20 0)   15  Lupus anticoagulant positive (795 79) (R76 0)   16  Memory loss (780 93) (R41 3)   17  Nephrolithiasis (592 0) (N20 0)   18  Neurocognitive deficits (781 99) (R29 818,R41 89)   19  Oscillopsia (368 15) (H53 19)   20   Osteoarthritis of both knees (715 96) (M17 0)   21  Polycystic kidney (753 12) (Q61 3)   22  Pulmonary nodules (793 19) (R91 8)   23  Transient right leg weakness (729 89) (R29 898)   24  Ulnar neuritis, right (723 4) (G56 21)   25  White matter abnormality on MRI of brain (793 0) (R93 0)    Past Medical History    1  History of Closed Fracture Of The Dorsal Cortical Left Triquetral Bone (814 03)   2  History of Closed Fracture Of The Left Triquetral Bone (814 03)   3  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)   4  History of Erectile dysfunction of non-organic origin (302 72) (F52 21)   5  History of Esophagitis, reflux (530 11) (K21 0)   6  History of arthritis (V13 4) (Z87 39)   7  History of head injury (V15 59) (Z87 828)   8  History of hypertension (V12 59) (Z86 79)   9  History of kidney disease (V13 09) (Z87 448)   10  History of Joint Pain In Both Knees (719 46)   11  History of Left Trapezius Muscle Strain (840 8)   12  History of Light Colored Bowel Movement (Acholic Stools) (406 8)    Surgical History    1  History of Hernia Repair   2  History of Knee Arthroscopy (Therapeutic)   3  History of Shoulder Surgery    Family History  Mother    1  Family history of Blood pressure instability   2  Family history of migraine headaches (V17 2) (Z82 0)   3  Family history of transient ischemic attacks (V17 1) (Z82 3)  Father    4  Family history of Paternity Unknown  Maternal Grandmother    5  Family history of Alzheimer's disease (V17 2) (Z82 0)   6  Family history of rheumatoid arthritis (V17 7) (Z82 61)  Maternal Grandfather    7  Family history of cerebrovascular accident (CVA) (V17 1) (Z82 3)   8  Family history of stroke (V17 1) (Z82 3)   9  Family history of transient ischemic attacks (V17 1) (Z82 3)  Family History    10  Family history of Arthritis (V17 7)   11  Family history of hypertension (V17 49) (Z82 49)   12   Family history of Osteoporosis (V17 81)    Social History    · Being A Social Drinker   · Caffeine Use   · Completed 12th grade   · Current every day smoker (305 1) (F17 200)   · Drug use (305 90) (F19 90)   ·     Current Meds   1  Aspirin 81 MG TABS; TAKE 1 TABLET DAILY; Therapy: [id number] to (Evaluate:13Mar2016); Last Rx:[id number] Ordered   2  Carvedilol 12 5 MG Oral Tablet; two tables twice daily; Therapy: 45TEC1559 to  Requested for: 14Apr2017 Recorded    Allergies    1  Codeine Sulfate TABS    Vitals  Signs   Recorded: 07Jun2017 01:41PM   Heart Rate: 84  Systolic: 282  Diastolic: 734  Height: 5 ft 8 in  Weight: 147 lb   BMI Calculated: 22 35  BSA Calculated: 1 79    Physical Exam    Constitutional   General appearance: No acute distress, well appearing and well nourished  Ears, Nose, Mouth, and Throat   Oropharynx: Normal with no erythema, edema, exudate lesions, or ulcers  Pulmonary   Respiratory effort: No increased work of breathing or signs of respiratory distress  Auscultation of lungs: Clear to auscultation  Cardiovascular   Auscultation of heart: Normal rate and rhythm, normal S1 and S2, without murmurs  Examination of extremities for edema and/or varicosities: Normal     Carotid pulses: Normal     Lymphatic   Palpation of lymph nodes in neck: No lymphadenopathy  Psychiatric   Orientation to person, place, and time: Normal     Mood and affect: Normal         Right Upper Extremity: All normal    Left Upper Extremity: All normal    Right Lower Extremity: All normal    Left Lower Extremity: All normal    Additional Findings - bilateral trochanteric bursa TTP  Results/Data  (1) BASIC METABOLIC PROFILE [id number] 11:44AM Cynthia Reilly    Order Number: [id number]     Test Name Result Flag Reference   GLUCOSE,RANDM 148 mg/dL H    If the patient is fasting, the ADA then defines impaired fasting glucose as > 100 mg/dL and diabetes as > or equal to 123 mg/dL     SODIUM 139 mmol/L  136-145   POTASSIUM 4 3 mmol/L  3 5-5 3   CHLORIDE 104 mmol/L  100-108   CARBON DIOXIDE 30 mmol/L 21-32   ANION GAP (CALC) 5 mmol/L  4-13   BLOOD UREA NITROGEN 17 mg/dL  5-25   CREATININE 1 24 mg/dL  0 60-1 30   Standardized to IDMS reference method   CALCIUM 9 0 mg/dL  8 3-10 1   eGFR Non-African American      >60 0 ml/min/1 73sq m   [facility] Disease Education Program recommendations are as follows:  GFR calculation is accurate only with a steady state creatinine  Chronic Kidney disease less than 60 ml/min/1 73 sq  meters  Kidney failure less than 15 ml/min/1 73 sq  meters  (1) PTH N-TERMINAL (INTACT) [id number] 11:44AM PlayPhone   TW Order Number: [id number]     Test Name Result Flag Reference   PARATHYROID HORMONE INTACT 93 6 pg/mL H 14 0-72 0     (1) CBC/ PLT (NO DIFF) 05Apr2017 11:44AM PlayPhone   TW Order Number: [id number]     Test Name Result Flag Reference   HEMATOCRIT 45 8 %  36 5-49 3   HEMOGLOBIN 15 8 g/dL  12 0-17 0   MCHC 34 5 g/dL  31 4-37 4   MCH 32 0 pg  26 8-34 3   MCV 93 fL  82-98   PLATELET COUNT 434 Thousands/uL  149-390   RBC COUNT 4 94 Million/uL  3 88-5 62   RDW 12 1 %  11 6-15 1   WBC COUNT 4 30 Thousand/uL L 4 31-10 16   MPV 11 2 fL  8 9-12 7     (1) LUPUS ANTICOAGULANT PROFILE 05Apr2017 11:44AM EPIC, Provider   Test ordered by: Figueroa Line     Test Name Result Flag Reference   PTT LUPUS ANTICOAGULANT 50 4 sec H 0 0 - 43 6   **Please note reference interval change**   DILUTE ROCIO VIPER VENOM TIME 48 7 sec H 0 0 - 44 0   DILUTE PROTHROMBIN TIME(DPT) 44 7 sec  0 0 - 55 0   THROMBIN TIME (DRVW) 17 0 sec  0 0 - 20 9   DPT CONFIRM RATIO 1 19 Ratio  0 00 - 1 40   LUPUS REFLEX INTERPRETATION Comment:     Results are consistent with the presence of a lupus anticoagulant  NOTE: Only persistent lupus anticoagulants are thought to be of clinical  significance   For this reason, repeat testing in 12 or more weeks after an  initial positive result should be considered to confirm or refute the  presence of a lupus anticoagulant, depending on clinical presentation  Results of lupus anticoagulant tests may be falsely positive in the  presence of certain anticoagulant therapies  Performed at:  [address]  [id number]  : Mark Zaragoza MD, Phone:  [phone]   DRVVT MIX INTERPRETATION 44 4 sec H 0 0 - 44 0   Performed at:  [address]  [id number]  : Mark Zaragoza MD, Phone:  [phone]   DRVVT/CONFIRM RATIO 1 2 ratio  0 8 - 1 2   Performed at:  [address]  [id number]  : Mark Zaragoza MD, Phone:  [phone]   PTT-LA MIX 46 7 sec H 0 0 - 40 6   Performed at:  [address]  [id number]  : Mark Zaragoza MD, Phone:  [phone]   HEXAGONAL PHOSPHOLIPID NEUTRALIZAT TEST 22 sec H 0 - 11   Performed at:  [address]  [id number]  : Mark Zaragoza MD, Phone:  [phone]     (1) C-REACTIVE PROTEIN [id number] 03:40PM EPIC, Provider   Test ordered by: Eva Rivera     Test Name Result Flag Reference   C-REACT PROTEIN <3 0 mg/L  <3 0     (1) COMPREHENSIVE METABOLIC PANEL [id number] 95:47IA EPIC, Provider   Test ordered by: Ludmila Sales Name Result Flag Reference   GLUCOSE,RANDM 93 mg/dL     If the patient is fasting, the ADA then defines impaired fasting glucose as > 100 mg/dL and diabetes as > or equal to 123 mg/dL     SODIUM 135 mmol/L L 136-145   POTASSIUM 3 9 mmol/L  3 5-5 3   CHLORIDE 100 mmol/L  100-108   CARBON DIOXIDE 28 mmol/L  21-32   ANION GAP (CALC) 7 mmol/L  4-13   BLOOD UREA NITROGEN 14 mg/dL  5-25   CREATININE 1 25 mg/dL  0 60-1 30   Standardized to IDMS reference method   CALCIUM 9 2 mg/dL  8 3-10 1   BILI, TOTAL 1 03 mg/dL H 0 20-1 00   ALK PHOSPHATAS 97 U/L     ALT (SGPT) 23 U/L  12-78   AST(SGOT) 19 U/L  5-45   ALBUMIN 4 1 g/dL  3 5-5 0   TOTAL PROTEIN 7 8 g/dL  6 4-8 2   eGFR Non-African American      >60 0 ml/min/1 73sq m   [facility] Disease Education Program recommendations are as follows:  GFR calculation is accurate only with a steady state creatinine  Chronic Kidney disease less than 60 ml/min/1 73 sq  meters  Kidney failure less than 15 ml/min/1 73 sq  meters       (1) SED RATE [id number] 03:40PM [facility], Provider   Test ordered by: Ardath Ra     Test Name Result Flag Reference   SED RATE 2 mm/hour  0-10     (1) C4 COMPLEMENT 03Oct2016 03:40PM [facility], Provider   Test ordered by: Ardath Ra     Test Name Result Flag Reference   C4 COMPLEMENT 23 mg/dL  14 - 44   Performed at:  [address] [place] [address]  [id number]  : Hermilo Mujica MD, Phone:  [phone]     (0) [facility] [id number] 03:40PM EPIC, Provider   Test ordered by: Ardath Ra     Test Name Result Flag Reference   C3 COMPLEMENT 96 mg/dL  82 - 167   Performed at:  [address] [place] [address]  [id number]  : Hermilo Mujica MD, Phone:  [phone]     ( [address] [place] [address] [id number] 03:40PM EPIC, Provider   Test ordered by: Ardath Ra     Test Name Result Flag Reference   RHEUMATOID FACTOR Negative  Negative     (1) MIGUEL ANTIBODIES [id number] 03:40PM EPIC, Provider   Test ordered by: Ardath Ra     Test Name Result Flag Reference   RNP AB CIE ID <0 2 AI  0 0 - 0 9   Performed at:  [address] [place] [address]  [id number]  : Hermilo Mujica MD, Phone:  [phone]   [place] AB CIE ID <0 2 AI  0 0 - 0 9     (1) Ayush Doe [id number] 03:40PM [facility], Provider   Test ordered by: Ardath Ra     Test Name Result Flag Reference   SS-A <0 2 AI  0 0 - 0 9   SS-B <0 2 AI  0 0 - 0 9   Performed at:  [address] [place] [address]  [id number]  : Hermilo Mujica MD, Phone:  [phone]     (1) DNA (DS) ANTIBODY [id number] 03:40PM [facility], Provider Test ordered by: [facility]     Test Name Result Flag Reference   DNA (DS) ANTIB 1 IU/mL  0 - 9   Negative      <5                                     Equivocal  5 - 9                                     Positive      >9  Performed at:  [address] [place] [address]  [id number]  : Larraine Rubinstein MD, Phone:  [phone]     (1) [address] [place] [address]  [id number] 03:40PM EPIC, Provider   Test ordered by: [facility]     Test Name Result Flag Reference   SCL70 AB <0 2 AI  0 0 - 0 9   Performed at:  [address] [place] [address]  [id number]  : Larraine Rubinstein MD, Phone:  [phone]     (1) [facility] [id number] 03:40PM EPIC, Provider   Test ordered by: [facility]     Test Name Result Flag Reference   CENTROMERE AB <0 2 AI  0 0 - 0 9   Performed at:  [address] [place] [address]  [id number]  : Larraine Rubinstein MD, Phone:  [phone]     (1) ANTICARDIOLIPIN ANTIBODY [id number] 03:40PM EPIC, Provider   Test ordered by: [facility]     Test Name Result Flag Reference   CARDLIP IGA AB <9 APL U/mL  0 - 11   Negative:              <12                            Indeterminate:     12 - 20                            Low-Med Positive: >20 - 80                            High Positive:         >80   CARDLIP IGG AB <9 GPL U/mL  0 - 14   Negative:              <15                            Indeterminate:     15 - 20                            Low-Med Positive: >20 - 80                            High Positive:         >80   CARDLIP IGM AB <9 MPL U/mL  0 - 12   Negative:              <13                            Indeterminate:     13 - 20                            Low-Med Positive: >20 - 80                            High Positive:         >80  Performed at:  [address] [place] [address]  [id number]  : Larraine Rubinstein MD, Phone:  [phone]     (1) [address] [facility] Nani Slade [id number] 03:40PM EPIC, Provider   Test ordered by: Fidencio Paz     Test Name Result Flag Reference   BETA-2 GLYCOPROTEIN I AB,IGG <9 GPI IgG units  0 - 20   The reference interval reflects a 3SD or 99th percentile interval,  which is thought to represent a potentially clinically significant  result in accordance with the International Consensus Statement on  the classification criteria for definitive antiphospholipid syndrome  (APS)  J Thromb Haem 2006;4:295-306  BETA-2 GLYCOPROTEIN I AB,IGM <9 GPI IgM units  0 - 32   The reference interval reflects a 3SD or 99th percentile interval,  which is thought to represent a potentially clinically significant  result in accordance with the International Consensus Statement on  the classification criteria for definitive antiphospholipid syndrome  (APS)  J Thromb Haem 2006;4:295-306  Performed at:  [address] [place] [address]  [id number]  : Jero Carter MD, Phone:  [phone]   [place] 88 <9 GPI IgA units  0 - 25   The reference interval reflects a 3SD or 99th percentile interval,  which is thought to represent a potentially clinically significant  result in accordance with the International Consensus Statement on  the classification criteria for definitive antiphospholipid syndrome  (APS)  J Thromb Haem 2006;4:295-306       (1) CYCLIC CITRULLINATED PEPTIDE [id number] 03:40PM [facility], Provider   Test ordered by: Fidencio Paz     Test Name Result Flag Reference   CYCLIC CITRULLINATED PEPTIDE 6 units  0 - 19   Negative               <20                            Weak positive      20 - 39                            Moderate positive  40 - 59                            Strong positive        >59  Performed at:  [address] [place] [address]  [id number]  : Jero Carter MD, Phone:  [phone]     (1) IDRIS SCREEN W/REFLEX TO TITER/PATTERN [id number] 03:40PM [facility], Provider   Test ordered by: Claudia Arriaga     Test Name Result Flag Reference   IDRIS SCREEN  Negative  Negative     (1) LUPUS ANTICOAGULANT PROFILE [id number] 03:40PM [facility], Provider   Test ordered by: Claudia Arriaga     Test Name Result Flag Reference   PTT LUPUS ANTICOAGULANT 53 7 sec H 0 0 - 50 0   **Effective October 24, 2016 PTT-LA reference interval**    will be changing to:                      0 0 - 40 6   DILUTE ROCIO VIPER VENOM TIME 48 6 sec  0 0 - 55 1   **Effective October 24, 2016 dRVVT reference interval**    will be changing to:                      0 0 - 44 0   DILUTE PROTHROMBIN TIME(DPT) 58 5 sec H 0 0 - 55 0   THROMBIN TIME (DRVW) 19 3 sec  0 0 - 20 0   **Effective October 24, 2016 Thrombin Time reference**    interval will be changing to:             0 0 - 20 9   DPT CONFIRM RATIO 1 27 Ratio  0 00 - 1 40   LUPUS REFLEX INTERPRETATION Comment:     Results are consistent with the presence of a lupus anticoagulant  NOTE: Only persistent lupus anticoagulants are thought to be of  clinical significance  For this reason, repeat testing in 12 or more  weeks after an initial positive result should be considered to  confirm or refute the presence of a lupus anticoagulant,  depending on clinical presentation    Performed at:  [address]  [id number]  : Yaima Ornelas MD, Phone:  [phone]   PTT-LA MIX 52 4 sec H 0 0 - 50 0   **Effective October 24, 2016 PTT-LA Mix reference**    interval will be changing to:             0 0 - 40 6  Performed at:  [address]  [id number]  : Yaima Ornelas MD, Phone:  [phone]   HEXAGONAL PHOSPHOLIPID NEUTRALIZAT TEST 36 0 sec H 0 0 - 8 0   Performed at:  [address]  [id number]  : Yaima Ornelas MD, Phone:  [phone]     (1) PROTEIN ELECTRO, SERUM [id number] 03:34PM [facility], Provider   Test ordered by: [facility]     Test Name Result Flag Reference   A/G RATIO 1 55  1 10-1 80   Albumin 60 8 %  52 0-65 0   Albumin Conc  4 62 g/dl  3 50-5 00   Alpha 1 Conc  0 33 g/dL  0 10-0 40   ALPHA 1 4 3 %  2 5-5 0   Alpha 2 Conc  0 71 g/dL  0 40-1 20   ALPHA 2 9 3 %  7 0-13 0   Beta 1 Conc  0 38 g/dL L 0 40-0 80   BETA-1 5 0 %  5 0-13 0   Beta 2 Conc 0 38 g/dL  0 20-0 50   BETA-2 5 0 %  2 0-8 0   Gamma Conc 1 19 g/dL  0 50-1 60   GAMMA GLOBULIN 15 6 %  12 0-22 0   Interpretation      The serum total protein and albumin are within normal limits  The serum protein electrophoresis shows a decreased beta-1 region  No monoclonal bands noted  Reviewed by: Alicia Maynard MD (1989) **Electronic Signature**   TOTAL PROTEIN 7 6 g/dL  6 4-8 2       Attending Note  Attending Note: I interviewed and examined the patient, I discussed the case with the Resident and reviewed the Resident's note, I supervised the Resident and I agree with the Resident management plan as it was presented to me  Level of Participation: I was present in clinic and examined the patient  Patient's History: Mr Maria E Goldman is a 79-year-old  male who presents the office for evaluation of a positive lupus anticoagulant on 2 separate occasions at the recommendation of hematology  He is currently utilizing aspirin 81 mg daily, as well as Coreg for atrial fibrillation  He has a history of polycystic kidney disease, and does have intermittent episodes of hematuria  He also had an episode of colitis 2 years ago that has resolved, but he continues to have intermittent diarrhea  He denies any nosebleeds, history of any blood clots, including DVTs, and no history of CVAs  He does have a strong family history of CVAs  He has been having some memory changes as well as visual changes and right-sided weakness  He is being evaluated by neurology, and is being worked up for her ALS versus MS as well as temporal lobe epilepsy   He does report significant arthralgias in a variety of areas, which he attributes to his occupation as a   He does note episodes of hand swelling as well as knee swelling, which are worst in the morning, and to improve within one hour or so  Key Parts of the Exam: On exam, there is no active synovitis  He does have osteoarthritic changes of the hands, with multiple Wilder's nodes  There is mild crepitus of the bilateral knees  He does have paraspinal spasm noted in the lumbar spine area  There is tenderness to palpation of the left trochanteric bursa  He does have full passive range of motion of his joints  There is no objective muscular weakness  Review of laboratory studies from October 3, 2016 revealed negative connective-tissue disease serologies, with the exception of a positive lupus anticoagulant  ESR and CRP were normal  A repeat lupus anticoagulant from April 5, 2017 was again positive  Diagnosis and Plan: At this time, his history, exam, and laboratory studies do appear most consistent with any phospholipid antibody syndrome, which is based on the presence of a positive lupus anticoagulant on 2 separate occasions  by at least 12 weeks  He has not had any clotting events, so I do agree with management with aspirin 81 mg daily at this time  If he is to develop any sort of deep vein thrombosis or pulmonary embolism, he should be placed on lifelong anticoagulation  There is no other evidence of any other concurrent connective tissue disease based on his laboratory studies  Therefore, there is no indication for any chronic immunosuppressive therapy, as this is only been shown to show efficacy in patients who have a concurrent connective tissue disease such as SLE  I will reevaluate him on an as-needed basis  He will call if he has any additional questions or concerns  I agree with the Resident's note        Future Appointments    Date/Time Provider Specialty Site   10/18/2017 09:20 AM Sondra Louise DO Cardiology  CARDIOLOGY [facility]   08/10/2017 01:00 PM WINSTON Ascencio  [facility]   08/16/2017 10:00 AM WINSTON Li   Orthopedic Surgery [facility]     Signatures   Electronically signed by : Bishop Reymundo DO; Jun 7 2017  2:46PM EST                       (Author)

## 2018-01-12 NOTE — MISCELLANEOUS
Message   Recorded as Task   Date: 09/27/2017 09:59 PM, Created By: Diana Browning   Task Name: Miscellaneous   Assigned To: America Casas   Regarding Patient: Xi Jaramillo, Status: Active   Comment:    Cody Graham - 27 Sep 2017 9:59 PM     TASK CREATED  plesase send lab slip to patient     Lab slip was mailed to the patient  AG      Active Problems    1  Acne vulgaris (706 1) (L70 0)   2  Antiphospholipid antibody syndrome (289 81) (D68 61)   3  Anxiety (300 00) (F41 9)   4  Atrial fibrillation (427 31) (I48 91)   5  Balance disorder (781 99) (R26 89)   6  Benign essential hypertension (401 1) (I10)   7  Bilateral hip pain (719 45) (M25 551,M25 552)   8  Cervical disc disease (722 91) (M50 90)   9  Cervical neuritis (723 4) (M54 12)   10  Chronic daily headache (784 0) (R51)   11  Chronic kidney disease, stage 3 (585 3) (N18 3)   12  Chronic nausea (787 02) (R11 0)   13  Esophageal reflux (530 81) (K21 9)   14  Gross hematuria (599 71) (R31 0)   15  Hand pain (729 5) (M79 643)   16  Hemiparesthesia (782 0) (R20 2)   17  Hypertension (401 9) (I10)   18  Hyponatremia (276 1) (E87 1)   19  Inflammatory arthropathy (714 9) (M19 90)   20  Irritable bowel syndrome with diarrhea (564 1) (K58 0)   21  Kidney cysts (753 10) (N28 1)   22  Left nephrolithiasis (592 0) (N20 0)   23  Lumbar radiculopathy (724 4) (M54 16)   24  Lupus anticoagulant positive (795 79) (R76 0)   25  Memory loss (780 93) (R41 3)   26  Nausea (787 02) (R11 0)   27  Nephrolithiasis (592 0) (N20 0)   28  Neurocognitive deficits (781 99) (R29 818,R41 89)   29  Oscillopsia (368 15) (H53 19)   30  Osteoarthritis of both knees (715 96) (M17 0)   31  Polycystic kidney (753 12) (Q61 3)   32  Pulmonary nodules (793 19) (R91 8)   33  Transient right leg weakness (729 89) (R29 898)   34  Ulnar neuritis, right (723 4) (G56 21)   35  White matter abnormality on MRI of brain (793 0) (R93 0)    Current Meds   1   Aspirin 81 MG TABS; TAKE 1 TABLET DAILY; Therapy: 08MWN1813 to (Evaluate:13Mar2016); Last Rx:73Srm6181 Ordered   2  Carvedilol 12 5 MG Oral Tablet; two tables twice daily; Therapy: 06HQA0938 to  Requested for: 14Apr2017 Recorded   3  Chlorthalidone 25 MG Oral Tablet; take 1 capsule by mouth daily; Therapy: 24Mwy8813 to (Evaluate:06Lkz1659)  Requested for: 40Mjh4430; Last   Rx:16Ite9024 Ordered   4  NIFEdipine ER 30 MG Oral Tablet Extended Release 24 Hour; Take 1 tablet twice daily; Therapy: 47YKM6201 to (Mika Sing)  Requested for: 34AOO9462; Last   Rx:16Nfj2465 Ordered   5  Potassium Chloride ER 10 MEQ Oral Tablet Extended Release; TAKE 1 TABLET DAILY; Therapy: 70RLC6460 to (CZHUQRRA:40LTI5068)  Requested for: 47RZM6188; Last   Rx:90Grh5643 Ordered   6  Xanax XR 1 MG Oral Tablet Extended Release 24 Hour (ALPRAZolam ER); One po hs; Therapy: 39SWR9560 to Recorded    Allergies    1   Codeine Sulfate TABS    Signatures   Electronically signed by : Cal Reyna DO; Oct  2 2017  9:01AM EST

## 2018-01-13 VITALS
WEIGHT: 147 LBS | HEART RATE: 84 BPM | DIASTOLIC BLOOD PRESSURE: 80 MMHG | SYSTOLIC BLOOD PRESSURE: 140 MMHG | BODY MASS INDEX: 22.35 KG/M2

## 2018-01-13 VITALS
DIASTOLIC BLOOD PRESSURE: 100 MMHG | WEIGHT: 148.06 LBS | OXYGEN SATURATION: 98 % | HEIGHT: 68 IN | BODY MASS INDEX: 22.44 KG/M2 | SYSTOLIC BLOOD PRESSURE: 160 MMHG | RESPIRATION RATE: 16 BRPM | HEART RATE: 72 BPM

## 2018-01-13 VITALS
SYSTOLIC BLOOD PRESSURE: 139 MMHG | WEIGHT: 144.38 LBS | BODY MASS INDEX: 21.88 KG/M2 | HEIGHT: 68 IN | OXYGEN SATURATION: 98 % | DIASTOLIC BLOOD PRESSURE: 90 MMHG | HEART RATE: 60 BPM

## 2018-01-13 VITALS
SYSTOLIC BLOOD PRESSURE: 206 MMHG | DIASTOLIC BLOOD PRESSURE: 103 MMHG | HEART RATE: 73 BPM | BODY MASS INDEX: 21.98 KG/M2 | WEIGHT: 145 LBS | HEIGHT: 68 IN

## 2018-01-13 VITALS
HEIGHT: 68 IN | OXYGEN SATURATION: 98 % | HEART RATE: 72 BPM | WEIGHT: 141.38 LBS | BODY MASS INDEX: 21.43 KG/M2 | TEMPERATURE: 96.3 F | SYSTOLIC BLOOD PRESSURE: 182 MMHG | DIASTOLIC BLOOD PRESSURE: 100 MMHG

## 2018-01-13 VITALS
HEIGHT: 68 IN | HEART RATE: 76 BPM | WEIGHT: 144.25 LBS | SYSTOLIC BLOOD PRESSURE: 173 MMHG | BODY MASS INDEX: 21.86 KG/M2 | DIASTOLIC BLOOD PRESSURE: 119 MMHG

## 2018-01-13 NOTE — MISCELLANEOUS
Message   Recorded as Task   Date: 08/23/2017 03:16 PM, Created By: Josephine Bell   Task Name: Care Coordination   Assigned To: Manuela Razo   Regarding Patient: Luz Elena Barroso, Status: In Progress   Comment:    Josephine Bell - 23 Aug 2017 3:16 PM     TASK CREATED  I could not reach the patient  His potassium was higher than usual at 5 3  I did recently start him on lisinopril  I have discontinued the lisinopril 10mg daily and begun amlodipine 10mg daily for his hypertension  He should stop lisinopril immediately and begin amlodipine  His serum Creatinine is stable  Please call the patient and let him know of these changes  Will also order repeat BMP for next week  Thanks! Claire Roy - 23 Aug 2017 4:55 PM     TASK IN PROGRESS   patient aware of the above  CR 8/23/2017      Active Problems    1  Acne vulgaris (706 1) (L70 0)   2  Antiphospholipid antibody syndrome (289 81) (D68 61)   3  Anxiety (300 00) (F41 9)   4  Atrial fibrillation (427 31) (I48 91)   5  Balance disorder (781 99) (R26 89)   6  Benign essential hypertension (401 1) (I10)   7  Bilateral hip pain (719 45) (M25 551,M25 552)   8  Cervical disc disease (722 91) (M50 90)   9  Cervical neuritis (723 4) (M54 12)   10  Chronic daily headache (784 0) (R51)   11  Chronic kidney disease, stage 3 (585 3) (N18 3)   12  Esophageal reflux (530 81) (K21 9)   13  Gross hematuria (599 71) (R31 0)   14  Hand pain (729 5) (M79 643)   15  Hemiparesthesia (782 0) (R20 2)   16  Hypertension (401 9) (I10)   17  Hyponatremia (276 1) (E87 1)   18  Inflammatory arthropathy (714 9) (M19 90)   19  Kidney cysts (753 10) (N28 1)   20  Left nephrolithiasis (592 0) (N20 0)   21  Lumbar radiculopathy (724 4) (M54 16)   22  Lupus anticoagulant positive (795 79) (R76 0)   23  Memory loss (780 93) (R41 3)   24  Nausea (787 02) (R11 0)   25  Nephrolithiasis (592 0) (N20 0)   26  Neurocognitive deficits (781 99) (R29 818,R41 89)   27  Oscillopsia (368 15) (H53 19)   28  Osteoarthritis of both knees (715 96) (M17 0)   29  Polycystic kidney (753 12) (Q61 3)   30  Pulmonary nodules (793 19) (R91 8)   31  Transient right leg weakness (729 89) (R29 898)   32  Ulnar neuritis, right (723 4) (G56 21)   33  White matter abnormality on MRI of brain (793 0) (R93 0)    Current Meds   1  AmLODIPine Besylate 10 MG Oral Tablet; TAKE 1 TABLET DAILY FOR BLOOD   PRESSURE; Therapy: 52Xdn3113 to (Evaluate:69Ots7857)  Requested for: 73Oaf2001; Last   Rx:40Myr4528 Ordered   2  Aspirin 81 MG TABS; TAKE 1 TABLET DAILY; Therapy: 49MIZ5427 to (Evaluate:13Mar2016); Last Rx:49Shr5128 Ordered   3  Carvedilol 12 5 MG Oral Tablet; two tables twice daily; Therapy: 37VON2733 to  Requested for: 81Dtx4748 Recorded   4  Xanax XR 1 MG Oral Tablet Extended Release 24 Hour; One po hs; Therapy: 61TJD1376 to Recorded    Allergies    1   Codeine Sulfate TABS    Signatures   Electronically signed by : Sandhya Deng DO; Aug 24 2017  9:38AM EST                       (Author)

## 2018-01-13 NOTE — MISCELLANEOUS
Signatures   Electronically signed by :  Jocelin Justice, ; Nov 22 2017  9:48AM EST                       (Author)

## 2018-01-13 NOTE — PROCEDURES
Procedures by Hanny Palmer MD at  2016  4:55 PM      Author:  Hanny Palmer MD Service:  Neurology Author Type:  Physician     Filed:  2016  5:04 PM Date of Service:  2016  4:55 PM Status:  Signed     :  Hanny Palmer MD (Physician)            Continuous Video EEG  Epilepsy Monitoring Unit    Patient Name:  Aster Delvalle  MRN: [id number]   :  1970 File #: [id number]   Age: 54 y o  Encounter #: [id number]   Date performed: -2016 Referring Provider: No att  providers found          Report date: 2016          Study type: Continuous video EEG, up to 24 hours    ICD 10 diagnosis: Transient alteration of awareness R40 4 and Other amnesia R41 3    Start time: 2016 08:01  End time: 2016 08:00    Patient History:  Patient is 54 y o  male admitted to the epilepsy monitoring unit to determine  whether spells of disorientation are seizures  He has a history of multiple concussions when he was younger; he complains of increasing forgetfulness, word finding difficulty, difficulty following conversations, and misplacing objects  Current AEDs: Oxcarbazepine 300-300  Medications include:     Description of Procedure:   A 24 hours continuous video EEG was performed with electrodes applied using the International 10-20 System at least 16 channels are reviewed and formatted into longitudinal bipolar, transverse bipolar, and referential (to common reference  or calculated common reference) montages  Additional electrodes used included T1, T2, and extraocular electrodes, and ECG, along with video recording  The EEG was recorded with the patient awake, drowsy, and asleep state  A monitoring technologist superivised the continuous recording  The recording was technically satisfactory  Findings:   Background Activity: The background is symmetric with respect to voltages and activity    During wakefulness, the background is well-organized  with anterior low amplitude beta activity and low-moderate amplitude posterior alpha activity  There is a symmetric 10-10 5  Hz posterior dominant rhythm that attenuates with eye opening  Drowsiness is characterized by attenuation of the alpha rhythm, prominence of anterior beta, central theta activity, presence of vertex waves, and positive occipital sharp transients of sleep (POSTS)  Stage 2 sleep is characterized by symmetric sleep spindles and K-complexes  Slow wave sleep and REM sleep are captured  There are occasional left F7 maximal sharp transients  Abnormal findings: There is intermittent polymorphic 1-2 Hz moderate voltage delta activity over the left temporal region  The right temporal focal slowing is less conspicious  Activation Procedures:   Hyperventilation was performed with good effort up to 4 minutes and did not produce any abnormalities  Stepped photic stimulation between 1-30 fps was performed and produced symmetric photic driving response  Other findings: The single lead ECG shows a regular sinus cardiac rhythm  Events:   08:09 - Jennifer vision  Nurse enters the room to introduce herself, he reports that he has 19/19 vision and he is working on the computer and is having a problem reading  There is no cognitive impairment; no significant change to the background EEG  was present; there is excessive EMG artifact over the midtemporal regions  10:56 - He reports difficulty keeping his eyes open  He was previously answering the neuropsychological questionnaire, squinting, he calls some one with his phone, complains that he cannot keep his eyes open whenever he does these tests  He pushes the button about a minute after  the phone call  He feels very fatigued and sleepy but no cognitive impairment and his speech is fluent  There is no significant change to the background EEG  There is no significant change to the awake EEG; no rhythmic or ictal activity is present      Interpretation: This is an abnormal 2 4 hours continuous video EEG recording due to bilateral left greater then right focal slowing over the temporal regions  This finding indicates focal cerebral dysfunction over the temporal regions, left worse than right, possibly structural in origin  Left temporal sharp transients are of unknown clinical significance  The events of blurry vision / fatigue are not associated with ictal changes to the awake EEG and semiologically are not likely to be epileptic in origin  MD [facility]  Sydney JOHNSON    Jul 13 2016  5:03PM [facility] Standard Time

## 2018-01-14 VITALS
WEIGHT: 144 LBS | TEMPERATURE: 96.4 F | OXYGEN SATURATION: 96 % | HEIGHT: 68 IN | HEART RATE: 79 BPM | BODY MASS INDEX: 21.82 KG/M2 | SYSTOLIC BLOOD PRESSURE: 130 MMHG | DIASTOLIC BLOOD PRESSURE: 70 MMHG | RESPIRATION RATE: 16 BRPM

## 2018-01-14 VITALS
HEIGHT: 68 IN | HEART RATE: 84 BPM | DIASTOLIC BLOOD PRESSURE: 98 MMHG | WEIGHT: 143.38 LBS | BODY MASS INDEX: 21.73 KG/M2 | SYSTOLIC BLOOD PRESSURE: 160 MMHG | RESPIRATION RATE: 16 BRPM | OXYGEN SATURATION: 99 %

## 2018-01-14 VITALS
WEIGHT: 147 LBS | HEART RATE: 77 BPM | DIASTOLIC BLOOD PRESSURE: 129 MMHG | HEIGHT: 68 IN | SYSTOLIC BLOOD PRESSURE: 178 MMHG | BODY MASS INDEX: 22.28 KG/M2

## 2018-01-14 VITALS
HEART RATE: 88 BPM | BODY MASS INDEX: 21.98 KG/M2 | WEIGHT: 145 LBS | DIASTOLIC BLOOD PRESSURE: 100 MMHG | SYSTOLIC BLOOD PRESSURE: 170 MMHG | HEIGHT: 68 IN

## 2018-01-14 VITALS
BODY MASS INDEX: 20.92 KG/M2 | SYSTOLIC BLOOD PRESSURE: 122 MMHG | DIASTOLIC BLOOD PRESSURE: 80 MMHG | WEIGHT: 138 LBS | HEART RATE: 56 BPM | HEIGHT: 68 IN

## 2018-01-14 NOTE — MISCELLANEOUS
Message   Recorded as Task   Date: 08/16/2017 09:50 AM, Created By: Adwoa Esquivel   Task Name: Follow Up   Assigned To: Jeff Betancourt   Regarding Patient: Bea Michel, Status: In Progress   CommentOctalbert Favor - 16 Aug 2017 9:50 AM     TASK CREATED  Caller: Self; General Medical Question; [phone]  wanted to know if Dr Amrik Buchanan thought it was ok for the pt to get on Zymbalta  His psychiatrist prescribed it for him  Call back at [phone]   Lucy Galicia - 16 Aug 2017 9:56 AM     TASK IN PROGRESS   Lucy Galicia - 16 Aug 2017 12:48 PM     TASK EDITED  MED OK PER DR ROBLEDO  PATIENT AWARE        Active Problems    1  Acne vulgaris (706 1) (L70 0)   2  Antiphospholipid antibody syndrome (289 81) (D68 61)   3  Anxiety (300 00) (F41 9)   4  Atrial fibrillation (427 31) (I48 91)   5  Balance disorder (781 99) (R26 89)   6  Benign essential hypertension (401 1) (I10)   7  Bilateral hip pain (719 45) (M25 551,M25 552)   8  Cervical disc disease (722 91) (M50 90)   9  Cervical neuritis (723 4) (M54 12)   10  Chronic daily headache (784 0) (R51)   11  Chronic kidney disease, stage 3 (585 3) (N18 3)   12  Esophageal reflux (530 81) (K21 9)   13  Gross hematuria (599 71) (R31 0)   14  Hemiparesthesia (782 0) (R20 2)   15  Hypertension (401 9) (I10)   16  Hyponatremia (276 1) (E87 1)   17  Kidney cysts (753 10) (N28 1)   18  Left nephrolithiasis (592 0) (N20 0)   19  Lumbar radiculopathy (724 4) (M54 16)   20  Lupus anticoagulant positive (795 79) (R76 0)   21  Memory loss (780 93) (R41 3)   22  Nausea (787 02) (R11 0)   23  Nephrolithiasis (592 0) (N20 0)   24  Neurocognitive deficits (781 99) (R29 818,R41 89)   25  Oscillopsia (368 15) (H53 19)   26  Osteoarthritis of both knees (715 96) (M17 0)   27  Polycystic kidney (753 12) (Q61 3)   28  Pulmonary nodules (793 19) (R91 8)   29  Transient right leg weakness (729 89) (R29 898)   30  Ulnar neuritis, right (723 4) (G56 21)   31   White matter abnormality on MRI of brain (793 0) (R93 0)    Current Meds   1  Aspirin 81 MG TABS; TAKE 1 TABLET DAILY; Therapy: 10XFJ3883 to (Evaluate:13Mar2016); Last Rx:34Kxx2278 Ordered   2  Carvedilol 12 5 MG Oral Tablet; two tables twice daily; Therapy: 68VUQ6803 to  Requested for: 14Apr2017 Recorded    Allergies    1   Codeine Sulfate TABS    Signatures   Electronically signed by : Kayla Bhatti, ; Aug 16 2017 12:48PM EST                       (Author)

## 2018-01-14 NOTE — MISCELLANEOUS
Provider Comments  Provider Comments:   PATIENT WAS A NO SHOW FOR THE APPOINTMENT      Signatures   Electronically signed by : Tamika Ramsey DO; Feb 7 2017 11:43AM EST                       (Author)

## 2018-01-14 NOTE — MISCELLANEOUS
Message  The patient had called stating he is not urinating a lot despite how much he is drinking no other urine symptoms  Per Dr Mary Jean have him go for a STAT BMP  Spoke with the patient and he is aware and the lab slip has been faxed to his wife and he will go soon for the lab work  Martha Gamble      Active Problems   1  Abnormal chest CT (793 2) (R93 8)  2  Abnormal weight loss (783 21) (R63 4)  3  Acne vulgaris (706 1) (L70 0)  4  Anxiety (300 00) (F41 9)  5  Atrial fibrillation (427 31) (I48 91)  6  Benign essential hypertension (401 1) (I10)  7  Chronic kidney disease, stage 3 (585 3) (N18 3)  8  Esophageal reflux (530 81) (K21 9)  9  Hand injury (959 4) (S69 90XA)  10  Hypertension (401 9) (I10)  11  Nicotine dependence (305 1) (F17 200)  12  Osteoarthritis of both knees (715 96) (M17 0)  13  Other muscle spasm (728 85) (M62 838)  14  Polycystic kidney (753 12) (Q61 3)  15  Renal Insufficiency (593 9)  16  TIA (transient ischemic attack) (435 9) (G45 9)  17  Wound of skin (782 9) (R23 8)    Current Meds  1  Aspirin 81 MG Oral Tablet; TAKE 1 TABLET DAILY; Therapy: 44HTP8745 to (Evaluate:13Mar2016); Last Rx:53Bxi5884 Ordered  2  Carvedilol 25 MG Oral Tablet; TAKE 1 TABLET TWICE DAILY; Therapy: (Stella Madden) to Recorded  3  Escitalopram Oxalate 10 MG Oral Tablet; TAKE 1 TABLET DAILY; Therapy: 46OHE0497 to (Evaluate:30Mar2016)  Requested for: 72Bji8742; Last   Rx:91Dzf9528 Ordered    Allergies   1   Codeine Sulfate TABS    Signatures   Electronically signed by : WINSTON Luna ; Mar  2 2016  8:28AM EST                       (Author)

## 2018-01-14 NOTE — RESULT NOTES
Verified Results  (1) BASIC METABOLIC PROFILE 47GBI2807 08:32AM Ronald Trevizo Order Number: [id number]     Test Name Result Flag Reference   SODIUM 133 mmol/L L 136-145   POTASSIUM 2 9 mmol/L L 3 5-5 3   CHLORIDE 95 mmol/L L 100-108   CARBON DIOXIDE 30 mmol/L  21-32   ANION GAP (CALC) 8 mmol/L  4-13   BLOOD UREA NITROGEN 22 mg/dL  5-25   CREATININE 1 34 mg/dL H 0 60-1 30   Standardized to IDMS reference method   CALCIUM 9 0 mg/dL  8 3-10 1   eGFR 63 ml/min/1 73sq m     National Kidney Disease Education Program recommendations are as follows:  GFR calculation is accurate only with a steady state creatinine  Chronic Kidney disease less than 60 ml/min/1 73 sq  meters  Kidney failure less than 15 ml/min/1 73 sq  meters  GLUCOSE FASTING 132 mg/dL H 65-99   Specimen collection should occur prior to Sulfasalazine administration due to the potential for falsely depressed results  Specimen collection should occur prior to Sulfapyridine administration due to the potential for falsely elevated results

## 2018-01-15 VITALS
SYSTOLIC BLOOD PRESSURE: 124 MMHG | DIASTOLIC BLOOD PRESSURE: 62 MMHG | HEART RATE: 68 BPM | HEIGHT: 68 IN | RESPIRATION RATE: 14 BRPM | WEIGHT: 132 LBS | BODY MASS INDEX: 20 KG/M2

## 2018-01-15 VITALS
HEART RATE: 88 BPM | WEIGHT: 146.25 LBS | BODY MASS INDEX: 22.17 KG/M2 | SYSTOLIC BLOOD PRESSURE: 136 MMHG | HEIGHT: 68 IN | DIASTOLIC BLOOD PRESSURE: 80 MMHG

## 2018-01-15 VITALS
HEIGHT: 68 IN | HEART RATE: 60 BPM | SYSTOLIC BLOOD PRESSURE: 150 MMHG | DIASTOLIC BLOOD PRESSURE: 98 MMHG | BODY MASS INDEX: 21.88 KG/M2 | WEIGHT: 144.38 LBS

## 2018-01-15 NOTE — MISCELLANEOUS
Message  BP has been high 189/119  Diastolic consistently 956 yesterday  Systolic consistently above 190s yesterday  Now on zanax  Will begin lisinopril 10mg dally  He is already on carvedilol 25mg twice daily  Will recheck BMP in 2 weeks  Will also check renin, aldosterone, cortisol, plasma metanephrines along with renal artery duplex  Patent is aware  Has been diagnosed with rheumatoid arthritis  Instructed to check twice daily and call office with BP readings  If BP remains > 190/100 should go to the hospital       Plan  Benign essential hypertension    · (1) BASIC METABOLIC PROFILE; Status:Active; Requested for:66Nsb9574;   Hypertension    · Lisinopril 10 MG Oral Tablet; TAKE 1 TABLET DAILY AS DIRECTED   · (1) ALDOSTERONE, BLOOD; Status:Active; Requested for:45Nhw4259;    · (1) CORTISOL AM SPECIMEN; Status:Active; Requested for:84Qcj7940;    · (1) METANEPHRINE, PLASMA; Status:Active; Requested for:93Yxz6411;    · (1) RENIN ACTIVITY; Status:Active; Requested for:80Sbv7528;    · (1) TSH; Status:Active;  Requested for:29Qum9009;    · VAS RENAL ARTERY COMPLETE BILATERAL; Status:Hold For - Scheduling; Requested  for:75Lmf7857;     Signatures   Electronically signed by : Mari Barone DO; Aug 18 2017 10:54AM EST                       (Author)

## 2018-01-15 NOTE — MISCELLANEOUS
Message      Recorded as Task   Date: 05/02/2016 11:20 AM, Created By: Lionel Gabriel   Task Name: Medical Complaint Callback   Assigned To: Lionel Gabirel   Regarding Patient: Bita Robledo, Status: Active   Comment:    Lionel Gabriel - 02 May 2016 11:20 AM     TASK CREATED  patient called to report that on  thursday and friday he experienced what he is calling an aura where he could smell a strong odor that no one else smelled  this resolved on friday but then he developed double vision and also a "hazing of his vision "and he would see shapes in his peripheral vision that werent there  he also reports every night he has been having severe leg cramps bilaterally  he did call and speak to someone on call this weekend and reports he told the doctor everything then and they told him just to keep on his same medication but he wanted to give you an update  thankyou      oxcarbazepine 600mg bid  lexapro 20 mg daily      [phone]   Dustin Gonsalez - 02 May 2016 4:40 PM     TASK REPLIED TO: Previously Assigned To Dustin Gonsalez  Please let Paul Bhatti know that I would not increase the OXC yet as he just got to 600mg BID, but if he has a similar "aura" I will likely increase the dose  In terms of the leg cramps, I am not familiar with this as a side effect of either OXC or Lexapro  It can happen with problems with the Potassium level in the blood but I would suggest that he call and review this with his PCP to see if they would want any blood work, consider a vitamin supplement, or another strategy       -Frank        Plan  Head injury, Hemiparesthesia, Seizure    · * MRI BRAIN W WO CONTRAST; Status:Active; Requested for:22Apr2016;   Hemiparesthesia    · EMG, performed same day as NCS, Comprehensive Study 5 or more muscles;  Status:Active;  Requested for:22Apr2016;     Signatures   Electronically signed by : Reva Delaney MD; May  2 2016  4:41PM EST                       (Author)

## 2018-01-15 NOTE — MISCELLANEOUS
Message   Recorded as Task   Date: 06/13/2016 09:35 AM, Created By: Heriberto Hook   Task Name: Med Renewal Request   Assigned To: Dustin Gonsalez   Regarding Patient: Everardo Garrison, Status: Active   Comment:    Heriberto Hook - 13 Jun 2016 9:35 AM     TASK CREATED  Script for trileptal sent for authorization, pharmacy called reported the pt is requesting new script for dose of 900mg BID  In your task I see the plan was to decrease him to 600mg BID and start Vimpat, however the most recent note from [facility] did not reflect this  Please approve script if pt is to remain on 900mg BID, or task back with instructions  Dustin Gonsalez - 14 Jun 2016 3:49 PM     TASK EDITED  For the time being we will plan to remain on 900 mg twice a day pending admission to the epilepsy monitoring unit  I approved the prescription          Plan  Seizure    · OXcarbazepine 300 MG Oral Tablet; 3 tabs bid    Signatures   Electronically signed by : Markie Jain MD; Jun 14 2016  3:49PM EST                       (Author)

## 2018-01-15 NOTE — RESULT NOTES
Message   Recorded as Task   Date: 10/16/2017 01:07 PM, Created By: Julianne Giron   Task Name: Care Coordination   Assigned To: Miya Reagan   Regarding Patient: Luz Elena Barroso, Status: Active   Comment:    Miya Reagan - 16 Oct 2017 1:07 PM     TASK CREATED  Auth submitted to [facility] for KAMINI scheduled for 10/25  Rec'd call from [facility], no PT or chiro notes on file; pended  Left message for patient to call back  Did he have PT or chiro for his neck? if yes, where/when  Prior injection for his neck? if yes, where/when  Neither indicated on questionairre  Dr Nimo Ferraro ordered PT at August visit  [phone]  ref# [id number]   Miya Reagan - 18 Oct 2017 9:15 AM     TASK EDITED  left message for patient to call me back; he cannot get his injection unless he has had PT or Sandborn Johning - 19 Oct 2017 2:37 PM     TASK EDITED  Called and spoke with patient  He did not have any chiropractic care and has not had any PT in a few years  Explained current denial for procedure and he is willing to do PT  Would you like to order PT for him and then I can try again following 6w completion to get auth and reschedule? Shelli Dandy - 20 Oct 2017 8:16 AM     TASK REPLIED TO: Previously Assigned To Shelli Dandy  Physical therapy order placed, please assist patient with scheduling   Miya Reagan - 20 Oct 2017 8:36 AM     TASK EDITED  noted, will call him   Miya Reagan - 20 Oct 2017 10:31 AM     TASK EDITED  Left message for patient to review that I will send PT order to him and to have x6w and then will resubmit for KAMINI auth (and aspirin hold also)  Call office back with any questions     and that KAMINI scheduled for 10/25 is cancelled

## 2018-01-16 NOTE — MISCELLANEOUS
Message  I tried calling and left a voicemail  I was unable to reach the patient  I left a message asking him to call the office back tomorrow or Thursday afternoon while I am in the office        Signatures   Electronically signed by : Markus Gillette DO; May 23 2017  6:00PM EST                       (Author)

## 2018-01-16 NOTE — MISCELLANEOUS
Message   Recorded as Task   Date: 04/04/2016 09:35 AM, Created By: Daisy Glez   Task Name: Medical Complaint Callback   Assigned To: Dustin Gonsalez   Regarding Patient: Rupali Lopez, Status: In Progress   Comment:    Renuka Joyce - 04 Apr 2016 9:35 AM     TASK CREATED  patient called to report that he continues to have episodes where he has tingling in his feet and hands and nausea  reports at times his vision gets blurry and he gets an electric feeling when he moves his eyes that lasts briefly  reports if he looks up and to the right the symptoms are worse(he had been at the er twice recently,once for these symptoms on march 28th and 1 time on april 1st due to hematuria(he is following with his urologist for this and reports it is better)) see notes in epic from  both visits  he reports he also has had 2 episodes over the last 2 weeks where he was in a car and for 10 to 20 seconds he forgot where he was (reports this is new and he did reorient after a few seconds but it did scare him as he never had this happen before)he is asking for your advise   Glory 1808 Apr 2016 9:19 PM     TASK EDITED  Called and left a message   Dustin Gonsalez - 04 Apr 2016 9:30 PM     TASK IN PROGRESS   [facility] - 05 Apr 2016 10:47 AM     TASK EDITED  Pt called the office to return your call, he can be reached at [phone] all day today as he states he is home from work and will be available   Dustin Gonsalez - 07 Apr 2016 8:15 AM     TASK EDITED  I called and left additional messages for Evelin Reese, will call again  Dustin Gonsalez - 07 Apr 2016 7:45 PM     TASK EDITED  I called and Kylee Mari a message  Considering his abnormal EEG (will discuss with him when I call him), and his symptoms which are likely mood related, I will start Lamotrigine     Jazzmine Taveras - 08 Apr 2016 11:14 AM     TASK REPLIED TO: Previously Assigned To Dustin Gonsalez  Pt called back, reports that he should have his phone on him today, please return phone call again at [phone]  Dustin Gonsalez - 08 Apr 2016 2:02 PM     TASK EDITED  I called and spoke with Ok Mccormick we reviewed his EEG  It does indicate that he is at increased risk for seizures  He did have 2 events of confusion in the car recently which are both short-lived, they involved confusion; specifically he was not certain where he was ([place]), but did not involve any altered awareness, loss of consciousness or motor function  Considering his EEG and other symptoms (note that he also complains of occasional twitching of the eyes while reading which will resolve immediately if he squeezes his eyes shut and looks away, as well as shocklike sensation in the hands if he looks up into the right with his eyes but not when he looks around using his head) I do think that antiseizure medication is warranted in this case  I would avoid Keppra due to mood concerns, but will start oxcarbazepine at a dose of 150 mg twice daily and titrate up to an initial goal dose of 600 mg twice daily with his initial sodium check in 2 weeks  We did discuss side effects over the phone, and reviewed basic seizure precautions  At this point his presentation really is not consistent with TIA, therefore I will remove that as a potential diagnosis as the presence of a TIA in someone who has paroxysmal atrial fibrillation such as [place] would constitute an indication for anticoagulation  Plan  Chronic kidney disease, stage 3, Seizure    · (1) BASIC METABOLIC PROFILE; Status:Active;  Requested for:22Apr2016;   Seizure    · OXcarbazepine 300 MG Oral Tablet; 1/2 tab bid X 1 week, then 1 tab bid X 1 week,  then 2 tabs BID    Signatures   Electronically signed by : Jacqueline Garg MD; Apr 8 2016  2:05PM EST                       (Author)

## 2018-01-16 NOTE — RESULT NOTES
Verified Results  (1) PROTEIN ELECTRO, SERUM 08Jun2016 03:35PM [place] [facility] Order Number: [id number]  TW Order Number: [id number] Comments: With immune fixation     Test Name Result Flag Reference   A/G RATIO 1 36  1 10-1 80   Albumin 57 6 %  52 0-65 0   Albumin Conc  4 20 g/dl  3 50-5 00   Alpha 1 Conc  0 39 g/dL  0 10-0 40   ALPHA 1 5 3 % H 2 5-5 0   Alpha 2 Conc  0 81 g/dL  0 40-1 20   ALPHA 2 11 1 %  7 0-13 0   Beta 1 Conc  0 39 g/dL L 0 40-0 80   BETA-1 5 4 %  5 0-13 0   Beta 2 Conc 0 42 g/dL  0 20-0 50   BETA-2 5 7 %  2 0-8 0   Gamma Conc 1 09 g/dL  0 50-1 60   GAMMA GLOBULIN 14 9 %  12 0-22 0   Interpretation      The serum total protein and albumin are within normal limits  The serum protein electrophoresis shows a decreased beta-1 region  No monoclonal bands noted  Reviewed by: Sadie Shah MD ([id number]) **Electronic Signature**   Performing Comments: With immune fixation   TOTAL PROTEIN 7 3 g/dL  6 4-8 2     (1) IDRIS SCREEN W/REFLEX TO TITER/PATTERN [id number] 03:35PM [place] [facility] Order Number: [id number]     Test Name Result Flag Reference   IDRIS SCREEN  Negative  Negative     (1) HIV AG/AB Costella Liner GEN [id number] 03:35PM [place] [facility] Order Number: [id number]     Test Name Result Flag Reference   HIV 1/2 AND P24 Non-Reactive  Non-Reactive   This test detects HIV 1, HIV2 and p24 Antigen  (1) HEP C PCR, QUALITATIVE 08Jun2016 03:35PM [place] [facility] Order Number: [id number]     Test Name Result Flag Reference   HEP C/ PCR,QUAL Negative  Negative   Negative: HCV RNA Not Detected    Performed at:  [address], [address]  : Cheryl Wahl MD, Phone:  [phone]       Discussion/Summary   Excellent lab results, no clear evidence of an infectious or autoimmune problem       Signatures   Electronically signed by : Alma Falcon MD; Jun 14 2016  3:46PM EST                       (Author)

## 2018-01-16 NOTE — MISCELLANEOUS
Provider Comments  Provider Comments:   Dear Marly Multani,    You missed your new patient appointment with Dr Keshia Alvarenga on 09/19/2017; please contact our office to reschedule at [phone]  We understand that many situations arise that occasionally prevents patients from keeping scheduled appointments  It is the policy of [address] [place] [address] [facility] that patients notify us 24 hours in advance if unable to keep a scheduled appointment  Missed appointments jeopardize strong physician-patient relationships  The appointment you missed could have easily been made available to another patient if you had contacted us to cancel  We like to accommodate all of our patients, but when patients miss an appointment it prevents us from being able to help everyone  In the future, we request at least 24 hours' notice of cancellation so we can make your appointment available to someone else in need  Sincerely,    The Physicians and Staff of [address] [place] [address] [facility]          Signatures   Electronically signed by :  Soren Coley, ; Sep 26 2017 10:53AM EST                       (Author)

## 2018-01-16 NOTE — MISCELLANEOUS
Message  The patient expresses concern about hematuria which has occurred in the past  The last episode was 7 months ago at which time he had gross hematuria  He thinks it may have been due to a bleeding cyst as he has APKD  Has not yet had CT scan ordered  To see hematology first week in May 2017 regarding possible initiation of coumadin in setting of APS  Signatures   Electronically signed by : Lisbeth Sharma DO;  Apr 18 2017  5:43PM EST                       (Author)

## 2018-01-17 ENCOUNTER — TRANSCRIBE ORDERS (OUTPATIENT)
Dept: ADMINISTRATIVE | Facility: HOSPITAL | Age: 48
End: 2018-01-17

## 2018-01-17 DIAGNOSIS — S89.92XA INJURY OF LEFT KNEE, INITIAL ENCOUNTER: Primary | ICD-10-CM

## 2018-01-17 NOTE — MISCELLANEOUS
Message  Patient called stating he is having a lot of blood in his urine more the normal  Per Dr Van Lambert have the patient go to the ER  Spoke with the patient and he is aware and he will go to the ER  Ailyn Boykin      Active Problems    1  Abnormal chest CT (793 2) (R93 8)   2  Abnormal weight loss (783 21) (R63 4)   3  Acne vulgaris (706 1) (L70 0)   4  Anxiety (300 00) (F41 9)   5  Atrial fibrillation (427 31) (I48 91)   6  Benign essential hypertension (401 1) (I10)   7  Chronic kidney disease, stage 3 (585 3) (N18 3)   8  Esophageal reflux (530 81) (K21 9)   9  Hand injury (959 4) (S69 90XA)   10  Hypertension (401 9) (I10)   11  Nicotine dependence (305 1) (F17 200)   12  Osteoarthritis of both knees (715 96) (M17 0)   13  Other muscle spasm (728 85) (M62 838)   14  Polycystic kidney (753 12) (Q61 3)   15  Renal Insufficiency (593 9)   16  TIA (transient ischemic attack) (435 9) (G45 9)   17  Wound of skin (782 9) (R23 8)    Current Meds   1  Aspirin 81 MG Oral Tablet; TAKE 1 TABLET DAILY; Therapy: 36GZR8814 to (Evaluate:13Mar2016); Last Rx:01Xin5530 Ordered   2  Carvedilol 25 MG Oral Tablet; TAKE 1 TABLET TWICE DAILY; Therapy: (Recorded:23Iwc6826) to Recorded    Allergies    1   Codeine Sulfate TABS    Signatures   Electronically signed by : WINSTON Silvestre ; Apr 4 2016 12:12PM EST                       (Author)

## 2018-01-17 NOTE — MISCELLANEOUS
History of Present Illness  TCM Communication [facility]: The patient is being contacted for follow-up after hospitalization  He was hospitalized at [facility]  The dates of hospitalization: 3 days, date of admission: 7/11/2016, date of discharge: 7/13/2016  Diagnosis: Paroxysmal Spells  He was discharged to home  Medications were not reviewed today  He did not schedule a follow up appointment  He refused a follow up appointment due to Pt did not returned calls  Communication performed and completed by A  Shaar Elam LPN      Active Problems    1  Abnormal chest CT (793 2) (R93 8)   2  Abnormal EEG (794 02) (R94 01)   3  Abnormal weight loss (783 21) (R63 4)   4  Acne vulgaris (706 1) (L70 0)   5  Anxiety (300 00) (F41 9)   6  Atrial fibrillation (427 31) (I48 91)   7  Benign essential hypertension (401 1) (I10)   8  Blood in urine (599 70) (R31 9)   9  Chronic kidney disease, stage 3 (585 3) (N18 3)   10  Esophageal reflux (530 81) (K21 9)   11  Hand injury (959 4) (S69 90XA)   12  Head injury (959 01) (S09 90XA)   13  Hemiparesthesia (782 0) (R20 2)   14  Hypertension (401 9) (I10)   15  Nicotine dependence (305 1) (F17 200)   16  Osteoarthritis of both knees (715 96) (M17 0)   17  Other muscle spasm (728 85) (M62 838)   18  Polycystic kidney (753 12) (Q61 3)   19  Renal Insufficiency (593 9)   20  Seizure   21  Seizures (780 39) (R56 9)   22  Wound of skin (782 9) (R23 8)    Past Medical History    1  History of Abdominal pain (789 00) (R10 9)   2  History of Abnormal weight loss (783 21) (R63 4)   3  History of Chest pain (786 50) (R07 9)   4  History of Closed Fracture Of The Dorsal Cortical Left Triquetral Bone (814 03)   5  History of Closed Fracture Of The Left Triquetral Bone (814 03)   6  History of Diarrhea (787 91) (R19 7)   7  History of Erectile dysfunction of non-organic origin (302 72) (F52 21)   8  History of Esophagitis, reflux (530 11) (K21 0)   9   History of Fatigue (780 79) (R53 83) 10  History of abdominal pain (V13 89) (Z87 898)   11  History of folliculitis (Q43 6) (E35 3)   12  History of hypertension (V12 59) (Z86 79)   13  History of Joint Pain In Both Knees (719 46)   14  History of Left Trapezius Muscle Strain (840 8)   15  History of Light Colored Bowel Movement (Acholic Stools) (024 5)   16  History of Memory loss (780 93) (R41 3)   17  History of Nausea (787 02) (R11 0)   18  History of Stye (373 11) (H00 019)   19  History of Visit For:   20  History of Wrist Injury (959 3)    Surgical History    1  History of Hernia Repair   2  History of Knee Arthroscopy   3  History of Shoulder Surgery    Family History  Mother    1  Family history of Blood pressure instability   2  Family history of migraine headaches (V17 2) (Z82 0)   3  Family history of transient ischemic attacks (V17 1) (Z82 3)  Father    4  Family history of Paternity Unknown  Maternal Grandmother    5  Family history of Alzheimer's disease (V17 2) (Z82 0)  Maternal Grandfather    6  Family history of stroke (V17 1) (Z82 3)   7  Family history of transient ischemic attacks (V17 1) (Z82 3)  Family History    8  Family history of Arthritis (V17 7)   9  Family history of Osteoporosis (V17 81)    Social History    · Being A Social Drinker   · Caffeine Use   · Completed 12th grade   · Current every day smoker (305 1) (F17 200)   · Drug use (305 90) (F19 90)   ·    · Nicotine dependence (305 1) (F17 200)    Current Meds   1  Aspirin 81 MG TABS; TAKE 1 TABLET DAILY; Therapy: 62MAJ3205 to (Evaluate:13Mar2016); Last Rx:32Dww4437 Ordered   2  Carvedilol 12 5 MG Oral Tablet; TAKE ONE TABLET BY MOUTH 2 TIMES A DAY; Therapy: 51IMN4661 to (Last Rx:97Kbg6922)  Requested for: 32QGW9936 Ordered   3  Carvedilol 25 MG Oral Tablet; TAKE 1 TABLET TWICE DAILY; Therapy: (Recorded:72Mod1010) to Recorded   4  OXcarbazepine 300 MG Oral Tablet; 3 tabs bid;    Therapy: 22Jny8346 to (Evaluate:82Ieb8634)  Requested for: 97XTQ9579; Last Rx: 82LSJ9608 Ordered    Allergies    1  Codeine Sulfate TABS    Future Appointments    Date/Time Provider Specialty Site   08/26/2016 02:30 PM Analia Sainz MD Neurology [place] [address]   10/05/2016 03:20 PM Rodolfo Hutchinson, [address] [place] [address]   Electronically signed by :  Norma Weiner MD; Aug  1 2016  1:06PM EST                       (Review)

## 2018-01-18 ENCOUNTER — ALLSCRIPTS OFFICE VISIT (OUTPATIENT)
Dept: OTHER | Facility: OTHER | Age: 48
End: 2018-01-18

## 2018-01-18 DIAGNOSIS — R41.89 OTHER SYMPTOMS AND SIGNS INVOLVING COGNITIVE FUNCTIONS AND AWARENESS: ICD-10-CM

## 2018-01-18 DIAGNOSIS — S06.9X9S UNSPECIFIED INTRACRANIAL INJURY WITH LOSS OF CONSCIOUSNESS OF UNSPECIFIED DURATION, SEQUELA (HCC): ICD-10-CM

## 2018-01-18 DIAGNOSIS — R29.818 OTHER SYMPTOMS AND SIGNS INVOLVING THE NERVOUS SYSTEM: ICD-10-CM

## 2018-01-18 DIAGNOSIS — F02.81 DEMENTIA IN OTHER DISEASES CLASSIFIED ELSEWHERE WITH BEHAVIORAL DISTURBANCE (HCC): ICD-10-CM

## 2018-01-18 NOTE — RESULT NOTES
Verified Results  * MRI BRAIN W WO CONTRAST [id number] 07:25PM Roberto Fermin Order Number: [id number]   Performing Comments: With epilepsy protocol, thin cuts through the temporal lobes, and saggital flair   - Patient Instructions: To schedule this appointment, please contact Central Scheduling at [phone]  Test Name Result Flag Reference   MRI BRAIN W WO CONTRAST (Report)     This is a summary report  The complete report is available in the patient's medical record  If you cannot access the medical record, please contact the sending organization for a detailed fax or copy  MRI BRAIN WITH AND WITHOUT CONTRAST, [facility] IMAGING     INDICATION:  S09 90XA; R20 2 WITH [facility] imaging History: Patient experiencing several neurological symptoms, h/o concussions x 7, recent onset of blurred vision  COMPARISON:  5/2/2016; 12/17/2015     TECHNIQUE: Sagittal T1, axial T2, axial [place], axial T1, axial FLAIR, axial diffusion imaging  Sagittal 3D volumetric imaging processed by [address] creating General Morphology and Age Related Atrophy reports  Sagittal, axial and coronal T1 postcontrast  Axial BRAVO post contrast       IV Contrast: 6 mL of gadobutrol injection (MULTI-DOSE)          IMAGE QUALITY: Diagnostic  FINDINGS:     BRAIN PARENCHYMA: There is no discrete mass, mass effect or midline shift  Solitary subcortical white matter lesion right frontal lobe image 19, series 6 is unchanged from the prior study, better seen on the previous study given differences in imaging    technique  Small choroidal fissure cyst on the right incidentally noted likely related to the mild asymmetry of the medial aspects of both temporal lobes, stable from the prior study    Brainstem and cerebellum demonstrate normal signal  There is no    intracranial hemorrhage  There is no evidence of acute infarction and diffusion imaging is unremarkable  There is no abnormal enhancement   There is no enhancing intracranial mass lesion  QUANTITATIVE:      Exam Quality: Adequate for volumetric analysis  Hippocampus     Total hippocampal volume (cc):  8 96    Percentile for similar age:   75th      Lateral ventricular volume (cc):   14 01    Percentile for similar age:   12th      Inferior lateral vent volume (cc):  2 71    Percentile for similar age:   77th        Quantitative conclusions:      Hippocampi:             Normal volume      Lateral Ventricle Volume:   Normal Volume     Temporal Horn Volume:    Normal Volume     Concordance between qualitative and quantitative hippocampal volume assessment: Concordant  Change in brain volumes: No previous volumetric study for comparison     Mean hippocampal volume loss among normal elderly: 0 7% per year, (-0 3 to 1 7; Annelise 2008; also Robby 2010)  VENTRICLES: The ventricles are normal in size and contour  SELLA AND PITUITARY GLAND: Normal      ORBITS: Normal      PARANASAL SINUSES: Normal      VASCULATURE: Evaluation of the major intracranial vasculature demonstrates appropriate flow voids  CALVARIUM AND SKULL BASE: Normal      EXTRACRANIAL SOFT TISSUES: There is a ovoid cystic lesion, likely a sebaceous cyst in the lateral aspect of the right face, immediately superior to the anterior margin of the right parotid gland, measuring approximately 5 mm, unchanged/smaller than on    prior studies  IMPRESSION:     1  No acute infarction, intracranial hemorrhage or mass   2  Neuro[facility] analysis was performed: Normal study; Does not support neurodegeneration  3  A solitary subcortical white matter lesion lateral aspect of the right frontal lobe is better visualized on the current study and is a finding of undetermined etiology and/or clinical significance  No new signal abnormality  Workstation performed: [id number]     Signed by:    Jesús Keita MD   4/12/17       Signatures   Electronically signed by : Kenna Morales MD; Jun 21 2017 10:34PM EST                       (Author)

## 2018-01-19 ENCOUNTER — GENERIC CONVERSION - ENCOUNTER (OUTPATIENT)
Dept: OTHER | Facility: OTHER | Age: 48
End: 2018-01-19

## 2018-01-23 VITALS
HEIGHT: 68 IN | BODY MASS INDEX: 21.22 KG/M2 | HEART RATE: 62 BPM | WEIGHT: 140 LBS | DIASTOLIC BLOOD PRESSURE: 87 MMHG | SYSTOLIC BLOOD PRESSURE: 142 MMHG

## 2018-01-23 NOTE — CONSULTS
Chief Complaint  Patient here for GI consult Patient has c/o abdominal pain, recent weight loss, trouble swallowing, symptoms started 10 yr ago  History of Present Illness  He presents for evaluation because of approximately 10 years of abdominal pain, reflux, nausea, vomiting, dysphagia, diarrhea, and about 40 lb of weight loss  The symptoms have been intermittent but he feels the worsened recently  He has not had any bleeding and he denies any prior history of a colonoscopy  His last upper endoscopy was approximately five years ago and he believes it was unremarkable  He denies any family history of colon polyps or colon cancer  His CT scan revealed possible thickening of his proximal small bowel indicating a possible enteritis  The history was obtained today from the patient and I agree with the documented history  Review of Systems    Constitutional: No fever or chills, feels well, no tiredness, no recent weight gain or weight loss  Eyes: No complaints of eye pain, no red eyes, no discharge from eyes, no itchy eyes  ENT: no complaints of earache, no hearing loss, no nosebleeds, no nasal discharge, no sore throat, no hoarseness  Cardiovascular: No complaints of slow heart rate, no fast heart rate, no chest pain, no palpitations, no leg claudication, no lower extremity  Respiratory: No complaints of shortness of breath, no wheezing, no cough, no SOB on exertion, no orthopnea or PND  Gastrointestinal: abdominal pain, nausea, vomiting and diarrhea, but as noted in HPI  Genitourinary: No complaints of dysuria, no incontinence, no hesitancy, no nocturia, no genital lesion, no testicular pain  Musculoskeletal: No complaints of arthralgia, no myalgias, no joint swelling or stiffness, no limb pain or swelling  Integumentary: No complaints of skin rash or skin lesions, no itching, no skin wound, no dry skin     Neurological: No compliants of headache, no confusion, no convulsions, no numbness or tingling, no dizziness or fainting, no limb weakness, no difficulty walking  Psychiatric: Is not suicidal, no sleep disturbances, no anxiety or depression, no change in personality, no emotional problems  Endocrine: No complaints of proptosis, no hot flashes, no muscle weakness, no erectile dysfunction, no deepening of the voice, no feelings of weakness  Hematologic/Lymphatic: No complaints of swollen glands, no swollen glands in the neck, does not bleed easily, no easy bruising  ROS reviewed  Active Problems    1  Abnormal EEG (794 02) (R94 01)   2  Acne vulgaris (706 1) (L70 0)   3  Antiphospholipid antibody syndrome (289 81) (D68 61)   4  Anxiety (300 00) (F41 9)   5  Atrial fibrillation (427 31) (I48 91)   6  Balance disorder (781 99) (R26 89)   7  Benign essential hypertension (401 1) (I10)   8  Bilateral hip pain (719 45) (M25 551,M25 552)   9  Cervical disc disease (722 91) (M50 90)   10  Cervical neuritis (723 4) (M54 12)   11  Cervical radiculitis (723 4) (M54 12)   12  Chronic daily headache (784 0) (R51)   13  Chronic kidney disease, stage 3 (585 3) (N18 3)   14  Chronic nausea (787 02) (R11 0)   15  CKD (chronic kidney disease) (585 9) (N18 9)   16  Esophageal reflux (530 81) (K21 9)   17  Gross hematuria (599 71) (R31 0)   18  Hand pain (729 5) (M79 643)   19  Hemiparesthesia (782 0) (R20 2)   20  Hypertension (401 9) (I10)   21  Hypokalemia (276 8) (E87 6)   22  Hyponatremia (276 1) (E87 1)   23  Inflammatory arthropathy (714 9) (M19 90)   24  Injury of left knee, initial encounter (959 7) (S89 92XA)   25  Irritable bowel syndrome with diarrhea (564 1) (K58 0)   26  Kidney cysts (753 10) (N28 1)   27  Left knee pain (719 46) (M25 562)   28  Left nephrolithiasis (592 0) (N20 0)   29  Lumbar radiculopathy (724 4) (M54 16)   30  Lupus anticoagulant positive (795 79) (R76 0)   31  Memory loss (780 93) (R41 3)   32  Nausea (787 02) (R11 0)   33   Nephrolithiasis (592 0) (N20 0) 34  Neurocognitive deficits (781 99) (R29 818,R41 89)   35  Oscillopsia (368 15) (H53 19)   36  Osteoarthritis of both knees (715 96) (M17 0)   37  Palpitations (785 1) (R00 2)   38  Polycystic kidney (753 12) (Q61 3)   39  Pulmonary nodules (793 19) (R91 8)   40  Transient right leg weakness (729 89) (R29 898)   41  Ulnar neuritis, right (723 4) (G56 21)   42  White matter abnormality on MRI of brain (793 0) (R93 0)    Past Medical History    · History of Closed Fracture Of The Dorsal Cortical Left Triquetral Bone (814 03)   · History of Closed Fracture Of The Left Triquetral Bone (814 03)   · 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)   · History of Erectile dysfunction of non-organic origin (302 72) (F52 21)   · History of Esophagitis, reflux (530 11) (K21 0)   · History of arthritis (V13 4) (Z87 39)   · History of head injury (V15 59) (C54 461)   · History of hypertension (V12 59) (Z86 79)   · History of kidney disease (V13 09) (Z87 448)   · History of Joint Pain In Both Knees (719 46)   · History of Left Trapezius Muscle Strain (840 8)   · History of Light Colored Bowel Movement (Acholic Stools) (659 8)    The active problems and past medical history were reviewed and updated today  Surgical History    · History of Hernia Repair   · History of Knee Arthroscopy (Therapeutic)   · History of Shoulder Surgery    The surgical history was reviewed and updated today         Family History    · Family history of Blood pressure instability   · Family history of migraine headaches (V17 2) (Z82 0)   · Family history of transient ischemic attacks (V17 1) (Z82 3)    · Family history of Paternity Unknown    · Family history of Alzheimer's disease (V17 2) (Z82 0)   · Family history of rheumatoid arthritis (V17 7) (Z82 61)    · Family history of cerebrovascular accident (CVA) (V17 1) (Z82 3)   · Family history of stroke (V17 1) (Z82 3)   · Family history of transient ischemic attacks (V17 1) (Z82 3)    · Family history of Arthritis (V17 7)   · Family history of hypertension (V17 49) (Z82 49)   · Family history of Osteoporosis (V17 81)    The family history was reviewed and updated today  Social History    · Being A Social Drinker   · Caffeine Use   · Completed 12th grade   · Current every day smoker (305 1) (F17 200)   · Employed   · History of marijuana use (305 23) (Z87 898)   ·   The social history was reviewed and updated today  Current Meds   1  AmLODIPine Besylate 10 MG Oral Tablet; one po qd; Therapy: 37TSO8292 to Recorded   2  Aspirin 81 MG TABS; TAKE 1 TABLET DAILY; Therapy: 42OYX5086 to (Evaluate:13Mar2016); Last Rx:32Jbm7946 Ordered   3  Carvedilol 12 5 MG Oral Tablet; two tables twice daily; Therapy: 88XJC4769 to (Last Rx:14Xbe4655)  Requested for: 18Oct2017 Ordered   4  Chlorthalidone 25 MG Oral Tablet; take 1 capsule by mouth daily; Therapy: 56Jln3019 to (Evaluate:77Xfu1875)  Requested for: 59Nwo8798; Last   Rx:15Nbj9615 Ordered   5  NIFEdipine ER 30 MG Oral Tablet Extended Release 24 Hour; Take 1 tablet twice daily; Therapy: 67RQG0341 to ([facility])  Requested for: 86MYX3874; Last   Rx:91Ydo0924 Ordered   6  Potassium Chloride ER 10 MEQ Oral Tablet Extended Release; TAKE 1 TABLET TWICE   DAILY; Therapy: 26CEZ6709 to ([facility])  Requested for: 42KMN0004; Last   Rx:10Nov2017 Ordered   7  Xanax XR 1 MG Oral Tablet Extended Release 24 Hour; One po hs; Therapy: 33ACY2527 to Recorded    The medication list was reviewed and updated today  Allergies    1  Codeine Sulfate TABS    Physical Exam    Constitutional   General appearance: No acute distress, well appearing and well nourished  Eyes   Conjunctiva and lids: No swelling, erythema, or discharge  Pupils and irises: Equal, round and reactive to light  Ears, Nose, Mouth, and Throat   External inspection of ears and nose: Normal     Nasal mucosa, septum, and turbinates: Normal without edema or erythema  Oropharynx: Normal with no erythema, edema, exudate or lesions  Pulmonary   Respiratory effort: No increased work of breathing or signs of respiratory distress  Auscultation of lungs: Clear to auscultation, equal breath sounds bilaterally, no wheezes, no rales, no rhonci  Cardiovascular   Auscultation of heart: Normal rate and rhythm, normal S1 and S2, without murmurs  Examination of extremities for edema and/or varicosities: Normal     Abdomen   Abdomen: Non-tender, no masses  Liver and spleen: No hepatomegaly or splenomegaly  Lymphatic   Palpation of lymph nodes in neck: No lymphadenopathy  Musculoskeletal   Gait and station: Normal     Digits and nails: Normal without clubbing or cyanosis  Inspection/palpation of joints, bones, and muscles: Normal     Skin   Skin and subcutaneous tissue: Normal without rashes or lesions  Psychiatric   Orientation to person, place and time: Normal     Mood and affect: Normal          Assessment    1  Esophageal reflux (530 81) (K21 9)   2  Chronic nausea (787 02) (R11 0)   3  Abnormal weight loss (783 21) (R63 4)   4  Chronic diarrhea (787 91) (K52 9)   5  Esophageal dysphagia (787 20) (R13 10)   6  Abdominal pain, chronic, generalized (789 07,338 29) (R10 84,G89 29)    Plan  Abdominal pain, chronic, generalized, Abnormal weight loss, Chronic diarrhea, Chronic  nausea, Esophageal dysphagia, Esophageal reflux    · MoviPrep 100 GM Oral Solution Reconstituted; USE AS DIRECTED   Rx By: Delmis Coley; Dispense: 0 Days ; #:1 Solution Reconstituted; Refill: 0; For: Abdominal pain, chronic, generalized, Abnormal weight loss, Chronic diarrhea, Chronic nausea, Esophageal dysphagia, Esophageal reflux; ROMELIA = N; Sent To: \[facility]\"" KOREY LYNN   · Follow Up After Tests Complete Evaluation and Treatment  Follow-up  Status: Hold For -  Scheduling  Requested for: 23Xwm3894   Ordered;  For: Abdominal pain, chronic, generalized, Abnormal weight loss, Chronic diarrhea, Chronic nausea, Esophageal dysphagia, Esophageal reflux; Ordered By: Marysol Troncoso Performed:  Due: 61BIG3691   · COLONOSCOPY (GI, SURG); Status:Hold For - Scheduling; Requested for:04Qbq3125;    Perform:[facility]; Order Comments:[place]; Due:28Pwk2919; Ordered; For:Abdominal pain, chronic, generalized, Abnormal weight loss, Chronic diarrhea, Chronic nausea, Esophageal dysphagia, Esophageal reflux; Ordered By:Rodrick Archuleta;   · EGD; Status:Hold For - Scheduling; Requested for:74Isi1650;    Perform:[facility]; Order Comments:with push enteroscopy at [facility]; BGN:[id number];OQGXUEZ; For:Abdominal pain, chronic, generalized, Abnormal weight loss, Chronic diarrhea, Chronic nausea, Esophageal dysphagia, Esophageal reflux; Ordered By:Rodrick Archuleta;    Discussion/Summary    He has multiple chronic gastrointestinal complaints including dysphagia, reflux, nausea, vomiting, abdominal pain, diarrhea, and weight loss  I am especially concerned about his 40 lb of weight loss  I will schedule him for an upper endoscopy and colonoscopy to rule out inflammatory bowel disease, celiac sprue, Helicobacter pylori infection, peptic ulcer disease, and malignancy  I spoke to him about the benefits and risks of the procedures as well as instructions for the bowel preparation and answered all of his questions  His CT scan findings of possible enteritis or likely due to an infectious enteritis or a nonspecific finding  Crohn's disease would be less likely but I will plan for a push enteroscopy to evaluate for this as well        Future Appointments    Signatures   Electronically signed by : Riley Rincon MD; Dec 20 2017 11:07AM EST                       (Author)

## 2018-01-23 NOTE — RESULT NOTES
Verified Results  * MRI LUMBAR SPINE WO CONTRAST [id number] 12:59PM Leona Babinski Order Number: [id number]    - Patient Instructions: To schedule this appointment, please contact Central Scheduling at [phone]  Test Name Result Flag Reference   MRI LUMBAR SPINE [address] (Report)     MRI LUMBAR SPINE WITHOUT CONTRAST     INDICATION: M54 16: Radiculopathy, lumbar region  History taken directly from the electronic ordering system  COMPARISON: MRI performed on 6/8/2009     TECHNIQUE: Sagittal T1, sagittal T2, sagittal inversion recovery, axial T1 and axial T2, coronal T2       IMAGE QUALITY: Diagnostic     FINDINGS:   Counting reference: Lumbosacral Junction For the purposes of this report, L4-5 is considered the level of the iliac crest      ALIGNMENT: Alignment appears relatively maintained  MARROW SIGNAL: Marrow signal is within normal limits without signs of an infiltrative process  Type I endplate changes seen at L5-S1  No additional areas of marrow edema pattern seen  DISTAL CORD AND CONUS: Normal size and signal within the distal cord and conus  The conus ends at the T12-L1 level  PARASPINAL SOFT TISSUES: Bilateral renal cysts noted  Areas of T2 hypointensity likely reflect hemorrhagic components  SACRUM: Normal signal within the sacrum  No evidence of insufficiency or stress fracture  LOWER THORACIC DISC SPACES: Normal disc height and signal  No disc herniation, canal stenosis or foraminal narrowing  LUMBAR DISC SPACES:        L1-L2: No significant spinal canal stenosis  No right and no left neural foraminal stenosis  L2-L3: No significant spinal canal stenosis  No right and no left neural foraminal stenosis  L3-L4: No significant spinal canal stenosis  No right and no left neural foraminal stenosis  L4-L5: Bulging of the annulus  Facet arthropathy  No significant spinal canal stenosis  Mild right and mild left neural foraminal stenosis  L5-S1: Airspace narrowing with circumferential disc bulge  Bilateral facet arthropathy  No significant spinal canal stenosis  Moderate right and moderate left neural foraminal stenosis  Degenerative change and specifically the foraminal stenosis at    progressed from prior  IMPRESSION:     Moderate bilateral neural foraminal stenosis at L5-S1  Mild bilateral neuroforaminal stenosis at L4-L5  No significant spinal canal stenosis         Workstation performed: [id number]     Signed by:   Sp Santos MD   9/1/17

## 2018-01-23 NOTE — RESULT NOTES
Discussion/Summary   Biopsies were all unremarkable  Since his colon polyp was not an adenoma, his next colonoscopy can be in 10 years or sooner if clinically indicated  He should followup in our office within the next 2 months to discuss further workup and management of his symptoms  Verified Results  (1) TISSUE EXAM [id number] 02:02PM Andrea Smith     Test Name Result Flag Reference   LAB AP CASE REPORT (Report)     Surgical Pathology Report             Case: [id number]                   Authorizing Provider: Meg Fournier MD      Collected:      12/29/2017 1402        Ordering Location:   [facility]    Received:      12/30/2017 [address] Endoscopy                            Pathologist:      Ashwin Ledesma MD                              Specimens:  A) - Jejunum, Jejunum biopsy                                      B) - Stomach, Gastric biopsy r/o H pylori                               C) - Esophagus, Proximal esophagus r/o EOE                               D) - Colon, Random colon biopsies- r/o microscopic colitis                       E) - Polyp, Colorectal, Rectal polyp- cold snare   LAB AP FINAL DIAGNOSIS (Report)     A  Small bowel, jejunum, biopsy:        - Benign small bowel mucosa  - No acute or chronic enteritis is identified         - No dysplasia or malignancy is identified  B  Stomach, biopsy:        - Oxyntic and antral mucosa with mild chronic inactive   gastritis  - No Helicobacter pylori organisms are identified on   immunohistochemical stain, performed with an appropriate control         - No intestinal metaplasia, dysplasia or malignancy is   identified  C  Esophagus, proximal, biopsy:        - Benign squamous mucosa with fewer than 2 eosinophils per high   power field  - No dysplasia or malignancy is identified  D  Colon, random, biopsy:        - Benign colonic mucosa          - No acute or microscopic colitis is identified         - No dysplasia or malignancy is identified  E  Colon, rectum, polypectomy:        - Hyperplastic polyp         - No dysplasia or malignancy is identified  Interpretation performed at , [address]   Electronically signed by Maegan Becerra MD on 1/2/2018 at 12:13 PM   LAB AP SURGICAL ADDITIONAL INFORMATION (Report)     All controls performed with the immunohistochemical stains reported above   reacted appropriately  These tests were developed and their performance   characteristics determined by [facility] or   [address] [place] [address]  They may not be cleared or approved by the [place]  Food and Drug Administration  The FDA has determined that such clearance   or approval is not necessary  These tests are used for clinical purposes  They should not be regarded as investigational or for research  This   laboratory has been approved by [place] [id number], designated as a high-complexity   laboratory and is qualified to perform these tests  LAB AP GROSS DESCRIPTION (Report)     A  The specimen is received in formalin, labeled with the patient's name   and hospital number, and is designated jejunum biopsy  The specimen   consists of a tan soft tissue fragment measuring 0 3 cm  Entirely   submitted  One cassette  B  The specimen is received in formalin, labeled with the patient's name   and hospital number, and is designated gastric biopsy rule out H pylori  The specimen consists of 2 tan soft tissue fragments measuring 0 2 and   0 3 cm  Entirely submitted  One cassette  C  The specimen is received in formalin, labeled with the patient's name   and hospital number, and is designated proximal esophagus  The specimen   consists of 2 white tan red soft tissue fragments each measuring 0 2-0 3   cm  Entirely submitted  One cassette  D   The specimen is received in formalin, labeled with the patient's name   and hospital number, and is designated Random colon biopsies rule out   microscopic colitis  The specimen consists of 2 tan soft tissue   fragments measuring 0 2 and 0 3 cm  Entirely submitted  One cassette  E  The specimen is received in formalin, labeled with the patient's name   and hospital number, and is designated Rectal polyp  The specimen   consists of a tan soft tissue fragment measuring 0 2 cm  Entirely   submitted  One cassette  Note: The estimated total formalin fixation time based upon information   provided by the submitting clinician and the standard processing schedule   is under 72 hours      [facility]

## 2018-01-24 VITALS
WEIGHT: 142.44 LBS | DIASTOLIC BLOOD PRESSURE: 88 MMHG | OXYGEN SATURATION: 99 % | SYSTOLIC BLOOD PRESSURE: 158 MMHG | BODY MASS INDEX: 21.59 KG/M2 | HEART RATE: 66 BPM | HEIGHT: 68 IN

## 2018-01-24 NOTE — PROGRESS NOTES
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 temporal parietal slowing with sharply contoured theta and T3 sharps interpretation rare left temporal slow delta but not significant, T3 sharps are transients that do not disrupt the background)   July 2016 interictal findings: organization of the background awake and asleep  temporal polymorphic delta activity apparent right polymorphic delta activity  temporal sharp transients but not epileptiform   event findings: had events of disorientation (he did not know where he was or how he got there but preserved consciousness and followed instructions), blurry vision, fatigue, and not recognizing his in laws that were not associated with abnormal electrical / synchronous activity on the EEG     Classification: Event of disorientation is non-epileptic in origin Classification: No diagnosis of epilepsy   of the right arm and leg is a normal study, no electrophysiological evidence of peripheral neuropathy (no evidence of ulnar, median, peroneal neuropathy or cervical or lumbar radiculopathy)    â 19/30 â 21/30    evaluation 12/5/2016 (Dr Gabriela Adams) Shayna Rao reported a host of memory difficulties including forgetting where objects or place, appointments, to take medications, to pay bills, the names people lesion no, destination when driving, and events that happened minutes or hours ago  the evaluation he was attentive to each task given and appeared to be trying his best, his performance on a formal test of malingering as well as on imbedded measure of effort was quite poor  The results of this assessment should be interpreted in light of his performance on these measures  = 97; VCI = 110; BALBINA = 98, WMI = 92, PSI â 86 his performance is on verbal reasoning and comprehension, weakness in processing speed was significantly impaired for both visual and auditory stimuli language skills were notable for weaknesses in category fluency, possibly due to fluctuations inability to focus attention ability to learn new visual and verbal information is compromised, cognitive deficits in visual free recall, visual recognition memory, and verbal with recall  difficulties with emotional responsiveness, emotional labile, rapid and extreme mood swings, poorly-controlled anger   1 12   388 nonreactive            Future Appointments      Date/Time Provider Specialty Site   02/23/2018 02:00 PM Rhiannon Schaffer DO Nephrology [facility]   02/21/2018 09:40 AM Sher Teixeira MD Gastroenterology Adult  [address]     Signatures    Electronically signed by : WINSTON Bo ; Jan 23 2018  6:49PM EST                       (Author)

## 2018-02-01 DIAGNOSIS — H53.19 OSCILLOPSIA: Primary | ICD-10-CM

## 2018-02-01 DIAGNOSIS — IMO0001: ICD-10-CM

## 2018-02-01 PROBLEM — R11.0 CHRONIC NAUSEA: Status: ACTIVE | Noted: 2017-08-28

## 2018-02-01 PROBLEM — F44.6: Status: ACTIVE | Noted: 2018-01-18

## 2018-02-01 PROBLEM — R10.84 ABDOMINAL PAIN, CHRONIC, GENERALIZED: Status: ACTIVE | Noted: 2017-12-20

## 2018-02-01 PROBLEM — N20.0 NEPHROLITHIASIS: Status: ACTIVE | Noted: 2017-06-06

## 2018-02-01 PROBLEM — M25.552 BILATERAL HIP PAIN: Status: ACTIVE | Noted: 2017-08-03

## 2018-02-01 PROBLEM — K52.9 CHRONIC DIARRHEA: Status: ACTIVE | Noted: 2017-12-20

## 2018-02-01 PROBLEM — E87.6 HYPOKALEMIA: Status: ACTIVE | Noted: 2017-11-10

## 2018-02-01 PROBLEM — N18.9 CKD (CHRONIC KIDNEY DISEASE): Status: ACTIVE | Noted: 2017-11-10

## 2018-02-01 PROBLEM — G56.21 ULNAR NEURITIS, RIGHT: Status: ACTIVE | Noted: 2017-05-10

## 2018-02-01 PROBLEM — R26.89 BALANCE DISORDER: Status: ACTIVE | Noted: 2017-05-10

## 2018-02-01 PROBLEM — M19.90 INFLAMMATORY ARTHROPATHY: Status: ACTIVE | Noted: 2017-08-17

## 2018-02-01 PROBLEM — R29.898 TRANSIENT RIGHT LEG WEAKNESS: Status: ACTIVE | Noted: 2017-05-10

## 2018-02-01 PROBLEM — D68.61 ANTIPHOSPHOLIPID ANTIBODY SYNDROME (HCC): Status: ACTIVE | Noted: 2017-06-07

## 2018-02-01 PROBLEM — M50.90 CERVICAL DISC DISEASE: Status: ACTIVE | Noted: 2017-08-16

## 2018-02-01 PROBLEM — M54.12 CERVICAL NEURITIS: Status: ACTIVE | Noted: 2017-08-16

## 2018-02-01 PROBLEM — S89.92XA INJURY OF LEFT KNEE: Status: ACTIVE | Noted: 2017-12-13

## 2018-02-01 PROBLEM — S06.9X9S MAJOR NEUROCOGNITIVE DISORDER AS LATE EFFECT OF TRAUMATIC BRAIN INJURY WITH BEHAVIORAL DISTURBANCE (HCC): Status: ACTIVE | Noted: 2018-01-18

## 2018-02-01 PROBLEM — R90.82 WHITE MATTER ABNORMALITY ON MRI OF BRAIN: Status: ACTIVE | Noted: 2017-05-03

## 2018-02-01 PROBLEM — M54.16 LUMBAR RADICULOPATHY: Status: ACTIVE | Noted: 2017-08-03

## 2018-02-01 PROBLEM — K58.0 IRRITABLE BOWEL SYNDROME WITH DIARRHEA: Status: ACTIVE | Noted: 2017-08-28

## 2018-02-01 PROBLEM — F02.81 MAJOR NEUROCOGNITIVE DISORDER AS LATE EFFECT OF TRAUMATIC BRAIN INJURY WITH BEHAVIORAL DISTURBANCE (HCC): Status: ACTIVE | Noted: 2018-01-18

## 2018-02-01 PROBLEM — E87.1 HYPONATREMIA: Status: ACTIVE | Noted: 2017-03-08

## 2018-02-01 PROBLEM — G89.29 ABDOMINAL PAIN, CHRONIC, GENERALIZED: Status: ACTIVE | Noted: 2017-12-20

## 2018-02-01 PROBLEM — R76.0 LUPUS ANTICOAGULANT POSITIVE: Status: ACTIVE | Noted: 2017-05-03

## 2018-02-01 PROBLEM — M25.551 BILATERAL HIP PAIN: Status: ACTIVE | Noted: 2017-08-03

## 2018-02-01 NOTE — PROGRESS NOTES
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

## 2018-02-02 ENCOUNTER — TELEPHONE (OUTPATIENT)
Dept: NEUROLOGY | Facility: CLINIC | Age: 48
End: 2018-02-02

## 2018-02-05 DIAGNOSIS — N18.9 CHRONIC KIDNEY DISEASE: ICD-10-CM

## 2018-02-05 DIAGNOSIS — N18.30 CHRONIC KIDNEY DISEASE, STAGE III (MODERATE) (HCC): ICD-10-CM

## 2018-02-07 ENCOUNTER — TELEPHONE (OUTPATIENT)
Dept: NEUROLOGY | Facility: CLINIC | Age: 48
End: 2018-02-07

## 2018-02-09 ENCOUNTER — TELEPHONE (OUTPATIENT)
Dept: GASTROENTEROLOGY | Facility: CLINIC | Age: 48
End: 2018-02-09

## 2018-02-19 ENCOUNTER — APPOINTMENT (OUTPATIENT)
Dept: LAB | Facility: MEDICAL CENTER | Age: 48
End: 2018-02-19
Payer: COMMERCIAL

## 2018-02-19 ENCOUNTER — TRANSCRIBE ORDERS (OUTPATIENT)
Dept: ADMINISTRATIVE | Facility: HOSPITAL | Age: 48
End: 2018-02-19

## 2018-02-19 DIAGNOSIS — N18.9 CHRONIC KIDNEY DISEASE: ICD-10-CM

## 2018-02-19 DIAGNOSIS — N18.30 CHRONIC KIDNEY DISEASE, STAGE III (MODERATE) (HCC): ICD-10-CM

## 2018-02-19 LAB
ALBUMIN SERPL BCP-MCNC: 3.8 G/DL (ref 3.5–5)
ANION GAP SERPL CALCULATED.3IONS-SCNC: 5 MMOL/L (ref 4–13)
BUN SERPL-MCNC: 16 MG/DL (ref 5–25)
CALCIUM SERPL-MCNC: 9.3 MG/DL (ref 8.3–10.1)
CHLORIDE SERPL-SCNC: 104 MMOL/L (ref 100–108)
CO2 SERPL-SCNC: 33 MMOL/L (ref 21–32)
CREAT SERPL-MCNC: 0.95 MG/DL (ref 0.6–1.3)
CREAT UR-MCNC: 90.4 MG/DL
ERYTHROCYTE [DISTWIDTH] IN BLOOD BY AUTOMATED COUNT: 11.9 % (ref 11.6–15.1)
GFR SERPL CREATININE-BSD FRML MDRD: 94 ML/MIN/1.73SQ M
GLUCOSE P FAST SERPL-MCNC: 81 MG/DL (ref 65–99)
HCT VFR BLD AUTO: 44.7 % (ref 36.5–49.3)
HGB BLD-MCNC: 15.9 G/DL (ref 12–17)
MAGNESIUM SERPL-MCNC: 2.3 MG/DL (ref 1.6–2.6)
MCH RBC QN AUTO: 33.2 PG (ref 26.8–34.3)
MCHC RBC AUTO-ENTMCNC: 35.6 G/DL (ref 31.4–37.4)
MCV RBC AUTO: 93 FL (ref 82–98)
MICROALBUMIN UR-MCNC: 8.5 MG/L (ref 0–20)
MICROALBUMIN/CREAT 24H UR: 9 MG/G CREATININE (ref 0–30)
PHOSPHATE SERPL-MCNC: 2.8 MG/DL (ref 2.7–4.5)
PLATELET # BLD AUTO: 166 THOUSANDS/UL (ref 149–390)
PMV BLD AUTO: 10.8 FL (ref 8.9–12.7)
POTASSIUM SERPL-SCNC: 4 MMOL/L (ref 3.5–5.3)
RBC # BLD AUTO: 4.79 MILLION/UL (ref 3.88–5.62)
SODIUM SERPL-SCNC: 142 MMOL/L (ref 136–145)
URATE SERPL-MCNC: 3.6 MG/DL (ref 4.2–8)
WBC # BLD AUTO: 4.97 THOUSAND/UL (ref 4.31–10.16)

## 2018-02-19 PROCEDURE — 82570 ASSAY OF URINE CREATININE: CPT

## 2018-02-19 PROCEDURE — 80069 RENAL FUNCTION PANEL: CPT

## 2018-02-19 PROCEDURE — 83735 ASSAY OF MAGNESIUM: CPT

## 2018-02-19 PROCEDURE — 36415 COLL VENOUS BLD VENIPUNCTURE: CPT

## 2018-02-19 PROCEDURE — 84550 ASSAY OF BLOOD/URIC ACID: CPT

## 2018-02-19 PROCEDURE — 85027 COMPLETE CBC AUTOMATED: CPT

## 2018-02-19 PROCEDURE — 82043 UR ALBUMIN QUANTITATIVE: CPT

## 2018-02-20 ENCOUNTER — OFFICE VISIT (OUTPATIENT)
Dept: GASTROENTEROLOGY | Facility: CLINIC | Age: 48
End: 2018-02-20
Payer: COMMERCIAL

## 2018-02-20 VITALS
HEIGHT: 68 IN | TEMPERATURE: 97.2 F | SYSTOLIC BLOOD PRESSURE: 161 MMHG | HEART RATE: 70 BPM | DIASTOLIC BLOOD PRESSURE: 100 MMHG | BODY MASS INDEX: 21.34 KG/M2 | WEIGHT: 140.8 LBS

## 2018-02-20 DIAGNOSIS — K58.0 IRRITABLE BOWEL SYNDROME WITH DIARRHEA: Primary | ICD-10-CM

## 2018-02-20 DIAGNOSIS — K21.9 GASTROESOPHAGEAL REFLUX DISEASE WITHOUT ESOPHAGITIS: ICD-10-CM

## 2018-02-20 DIAGNOSIS — K52.9 CHRONIC DIARRHEA: ICD-10-CM

## 2018-02-20 PROCEDURE — 99214 OFFICE O/P EST MOD 30 MIN: CPT | Performed by: INTERNAL MEDICINE

## 2018-02-20 RX ORDER — PANTOPRAZOLE SODIUM 40 MG/1
TABLET, DELAYED RELEASE ORAL
COMMUNITY
Start: 2018-01-12 | End: 2018-03-24

## 2018-02-20 NOTE — PROGRESS NOTES
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 since his recent endoscopy and colonoscopy along with random biopsies did not show an alternate diagnosis  Given his smoking history, I will check his fecal of last days to rule out pancreatic insufficiency  I will also check his fecal leukocytes to evaluate for evidence of small bowel Crohn's disease that may have been missed  I have asked him to take Imodium up to 4 times per day and will try him on a low FODMAP diet  If his symptoms persist we could consider a capsule endoscopy or CT enterography to evaluate his small intestine further   - FECAL LEUKOCYTES; Future  - Pancreatic elastase, fecal; Future    2  Gastroesophageal reflux disease without esophagitis  He no longer has any reflux symptoms  He has a hiatal hernia that was probably the reason that he gets intermittent symptoms  As long as he is asymptomatic he does not have to take the Protonix anymore     ______________________________________________________________________    SUBJECTIVE:  He presents for follow-up of his reflux, abdominal pain, and diarrhea  His weight has remained stable since his last visit  He continues to have diarrhea but feels his abdominal pain is better  He no longer has reflux symptoms and is no longer taking a proton pump inhibitor  Since his last visit he had an upper endoscopy, push enteroscopy, and colonoscopy that were notable for a hiatal hernia, diverticulosis, and a hyperplastic polyp  He denies any new complaints  He has chronic joint pains and complains of acne involving his face  REVIEW OF SYSTEMS IS OTHERWISE NEGATIVE        Historical Information   Past Medical History:   Diagnosis Date    A-fib [facility])     Abdominal pain     Abnormal weight loss     Anxiety     Chronic diarrhea     CKD (chronic kidney disease)     Concussion     Dysphagia     Epilepsy (HCC)     GERD (gastroesophageal reflux disease)     Hypertension     Hypokalemia     Irregular heart beat     Irritable bowel syndrome     Lupus anticoagulant disorder ([address] )     Memory loss     Renal disorder     stage 3 renal failure     Past Surgical History:   Procedure Laterality Date    COLONOSCOPY      FRACTURE SURGERY      HERNIA REPAIR      KNEE ARTHROSCOPY      UT ESOPHAGOGASTRODUODENOSCOPY TRANSORAL DIAGNOSTIC N/A 12/29/2017    Procedure: EGD WITH PUSH ENTROSCOPE AND COLONOSCOPY;  Surgeon: Berkley Moe MD;  Location: [facility] GI LAB; Service: Gastroenterology    SHOULDER SURGERY       Social History   History   Alcohol Use    Yes     Comment: socially     History   Drug Use No     History   Smoking Status    Current Every Day Smoker    Packs/day: 1 00    Types: Cigarettes   Smokeless Tobacco    Never Used     History reviewed  No pertinent family history  Meds/Allergies       Current Outpatient Prescriptions:     amLODIPine (NORVASC) 10 mg tablet    aspirin 81 MG tablet    carvedilol (COREG) 12 5 mg tablet    chlorthalidone 25 mg tablet    NIFEdipine ER (ADALAT CC) 30 MG 24 hr tablet    pantoprazole (PROTONIX) 40 mg tablet    potassium chloride (K-DUR) 10 mEq tablet    Allergies   Allergen Reactions    Codeine Headache     Reaction Date: [id number];            Objective     Blood pressure 161/100, pulse 70, temperature (!) 97 2 °F (36 2 °C), temperature source Tympanic, height 5' 8" (1 727 m), weight 63 9 kg (140 lb 12 8 oz)        PHYSICAL EXAM:      General Appearance:   Alert, cooperative, no distress   HEENT:   Normocephalic, atraumatic, anicteric      Neck:  Supple, symmetrical, trachea midline   Lungs:   Clear to auscultation bilaterally; no rales, rhonchi or wheezing; respirations unlabored    Heart[de-identified]   Regular rate and rhythm; no murmur, rub, or gallop  Abdomen:   Soft, epigastric tenderness, non-distended; normal bowel sounds; no masses, no organomegaly    Genitalia:   Deferred    Rectal:   Deferred    Extremities:  No cyanosis, clubbing or edema    Pulses:  2+ and symmetric    Skin:  No jaundice, rashes, or lesions    Lymph nodes:  No palpable cervical lymphadenopathy        Lab Results:   No visits with results within 1 Day(s) from this visit  Latest known visit with results is:   Appointment on 02/19/2018   Component Date Value    WBC 02/19/2018 4 97     RBC 02/19/2018 4 79     Hemoglobin 02/19/2018 15 9     Hematocrit 02/19/2018 44 7     MCV 02/19/2018 93     MCH 02/19/2018 33 2     MCHC 02/19/2018 35 6     RDW 02/19/2018 11 9     Platelets 70/29/1036 166     MPV 02/19/2018 10 8     Creatinine, Ur 02/19/2018 90 4     Microalbum  ,U,Random 02/19/2018 8 5     Microalb Creat Ratio 02/19/2018 9     Albumin 02/19/2018 3 8     Calcium 02/19/2018 9 3     Phosphorus 02/19/2018 2 8     BUN 02/19/2018 16     Creatinine 02/19/2018 0 95     Sodium 02/19/2018 142     Potassium 02/19/2018 4 0     Chloride 02/19/2018 104     CO2 02/19/2018 33*    Anion Gap 02/19/2018 5     eGFR 02/19/2018 94     Glucose, Fasting 02/19/2018 81     Uric Acid 02/19/2018 3 6*    Magnesium 02/19/2018 2 3          Radiology Results:   No results found

## 2018-02-20 NOTE — LETTER
February 20, 2018     Jordi Moseley MD  [id number]  [address] [facility] [address] [facility]    Patient: Rosy Rodriguez   YOB: 1970   Date of Visit: 2/20/2018       Dear Dr Orin Pruitt: Thank you for referring Rosy Rodriguez to me for evaluation  Below are my notes for this consultation  If you have questions, please do not hesitate to call me  I look forward to following your patient along with you  Sincerely,        Tre Rendon MD        CC: No Recipients  Tre Rendon MD  2/20/2018 11:40 AM  Sign at close encounter  [facility] - Outpatient Follow-up Note  Rosy Rodriguez 49 y o  male MRN: [id number]  Encounter: [id number]          ASSESSMENT AND PLAN:      1  Irritable bowel syndrome with chronic diarrhea  He most likely has diarrhea predominant irritable bowel syndrome since his recent endoscopy and colonoscopy along with random biopsies did not show an alternate diagnosis  Given his smoking history, I will check his fecal of last days to rule out pancreatic insufficiency  I will also check his fecal leukocytes to evaluate for evidence of small bowel Crohn's disease that may have been missed  I have asked him to take Imodium up to 4 times per day and will try him on a low FODMAP diet  If his symptoms persist we could consider a capsule endoscopy or CT enterography to evaluate his small intestine further   - FECAL LEUKOCYTES; Future  - Pancreatic elastase, fecal; Future    2  Gastroesophageal reflux disease without esophagitis  He no longer has any reflux symptoms  He has a hiatal hernia that was probably the reason that he gets intermittent symptoms  As long as he is asymptomatic he does not have to take the Protonix anymore     ______________________________________________________________________    SUBJECTIVE:  He presents for follow-up of his reflux, abdominal pain, and diarrhea  His weight has remained stable since his last visit    He continues to have diarrhea but feels his abdominal pain is better  He no longer has reflux symptoms and is no longer taking a proton pump inhibitor  Since his last visit he had an upper endoscopy, push enteroscopy, and colonoscopy that were notable for a hiatal hernia, diverticulosis, and a hyperplastic polyp  He denies any new complaints  He has chronic joint pains and complains of acne involving his face  REVIEW OF SYSTEMS IS OTHERWISE NEGATIVE  Historical Information   Past Medical History:   Diagnosis Date    A-fib ([address] )     Abdominal pain     Abnormal weight loss     Anxiety     Chronic diarrhea     CKD (chronic kidney disease)     Concussion     Dysphagia     Epilepsy (HCC)     GERD (gastroesophageal reflux disease)     Hypertension     Hypokalemia     Irregular heart beat     Irritable bowel syndrome     Lupus anticoagulant disorder (HCC)     Memory loss     Renal disorder     stage 3 renal failure     Past Surgical History:   Procedure Laterality Date    COLONOSCOPY      FRACTURE SURGERY      HERNIA REPAIR      KNEE ARTHROSCOPY      NM ESOPHAGOGASTRODUODENOSCOPY TRANSORAL DIAGNOSTIC N/A 12/29/2017    Procedure: EGD WITH PUSH ENTROSCOPE AND COLONOSCOPY;  Surgeon: Gilberto Cowart MD;  Location: [facility] GI LAB; Service: Gastroenterology    SHOULDER SURGERY       Social History   History   Alcohol Use    Yes     Comment: socially     History   Drug Use No     History   Smoking Status    Current Every Day Smoker    Packs/day: 1 00    Types: Cigarettes   Smokeless Tobacco    Never Used     History reviewed  No pertinent family history      Meds/Allergies       Current Outpatient Prescriptions:     amLODIPine (NORVASC) 10 mg tablet    aspirin 81 MG tablet    carvedilol (COREG) 12 5 mg tablet    chlorthalidone 25 mg tablet    NIFEdipine ER (ADALAT CC) 30 MG 24 hr tablet    pantoprazole (PROTONIX) 40 mg tablet    potassium chloride (K-DUR) 10 mEq tablet    Allergies Allergen Reactions    Codeine Headache     Reaction Date: [id number];            Objective     Blood pressure 161/100, pulse 70, temperature (!) 97 2 °F (36 2 °C), temperature source Tympanic, height 5' 8" (1 727 m), weight 63 9 kg (140 lb 12 8 oz)  PHYSICAL EXAM:      General Appearance:   Alert, cooperative, no distress   HEENT:   Normocephalic, atraumatic, anicteric      Neck:  Supple, symmetrical, trachea midline   Lungs:   Clear to auscultation bilaterally; no rales, rhonchi or wheezing; respirations unlabored    Heart[de-identified]   Regular rate and rhythm; no murmur, rub, or gallop  Abdomen:   Soft, epigastric tenderness, non-distended; normal bowel sounds; no masses, no organomegaly    Genitalia:   Deferred    Rectal:   Deferred    Extremities:  No cyanosis, clubbing or edema    Pulses:  2+ and symmetric    Skin:  No jaundice, rashes, or lesions    Lymph nodes:  No palpable cervical lymphadenopathy        Lab Results:   No visits with results within 1 Day(s) from this visit  Latest known visit with results is:   Appointment on 02/19/2018   Component Date Value    WBC 02/19/2018 4 97     RBC 02/19/2018 4 79     Hemoglobin 02/19/2018 15 9     Hematocrit 02/19/2018 44 7     MCV 02/19/2018 93     MCH 02/19/2018 33 2     MCHC 02/19/2018 35 6     RDW 02/19/2018 11 9     Platelets 11/45/8644 166     MPV 02/19/2018 10 8     Creatinine, Ur 02/19/2018 90 4     Microalbum  ,U,Random 02/19/2018 8 5     Microalb Creat Ratio 02/19/2018 9     Albumin 02/19/2018 3 8     Calcium 02/19/2018 9 3     Phosphorus 02/19/2018 2 8     BUN 02/19/2018 16     Creatinine 02/19/2018 0 95     Sodium 02/19/2018 142     Potassium 02/19/2018 4 0     Chloride 02/19/2018 104     CO2 02/19/2018 33*    Anion Gap 02/19/2018 5     eGFR 02/19/2018 94     Glucose, Fasting 02/19/2018 81     Uric Acid 02/19/2018 3 6*    Magnesium 02/19/2018 2 3          Radiology Results:   No results found

## 2018-02-23 ENCOUNTER — OFFICE VISIT (OUTPATIENT)
Dept: NEPHROLOGY | Facility: CLINIC | Age: 48
End: 2018-02-23
Payer: COMMERCIAL

## 2018-02-23 VITALS
SYSTOLIC BLOOD PRESSURE: 140 MMHG | BODY MASS INDEX: 22.88 KG/M2 | HEIGHT: 68 IN | WEIGHT: 151 LBS | DIASTOLIC BLOOD PRESSURE: 84 MMHG

## 2018-02-23 DIAGNOSIS — Q61.3 POLYCYSTIC KIDNEY DISEASE: Primary | Chronic | ICD-10-CM

## 2018-02-23 DIAGNOSIS — N28.89 HYPERTENSION SECONDARY TO OTHER RENAL DISORDERS: ICD-10-CM

## 2018-02-23 DIAGNOSIS — I15.1 HYPERTENSION SECONDARY TO OTHER RENAL DISORDERS: ICD-10-CM

## 2018-02-23 PROCEDURE — 99213 OFFICE O/P EST LOW 20 MIN: CPT | Performed by: INTERNAL MEDICINE

## 2018-02-23 NOTE — PROGRESS NOTES
NEPHROLOGY OUTPATIENT PROGRESS NOTE   Mary Tong 49 y o  male MRN: [id number]  Reason for visit:  Polycystic kidney disease    ASSESSMENT and PLAN:  1  Polycystic kidney disease, renal function remains stable  2  Hypertension, under relatively good control    · Overall renal function remains stable, electrolytes are within normal range, continue with potassium supplementation  · Hypertension is under good control as well, continue with current regimen  · Plan to see me in approximately 6 months with repeat laboratory studies at that time  · Will also need periodic renal ultrasound given bilateral complex cysts  SUBJECTIVE / INTERVAL HISTORY:  Seen and examined  Patient awake alert  Has had numerous complaints including poor appetite, intermittent abdominal pain and nausea  Also states that he has no good sense of smell, currently following with Neurology  Currently denies any chest pain shortness of breath or significant swelling  No current abdominal pain  Review of Systems      OBJECTIVE:  /84 (BP Location: Left arm, Patient Position: Sitting, Cuff Size: Adult)   Ht 5' 8" (1 727 m)   Wt 68 5 kg (151 lb)   BMI 22 96 kg/m²     Physical Exam   Constitutional: He is oriented to person, place, and time  He appears well-developed  No distress  HENT:   Head: Normocephalic  Mouth/Throat: Oropharynx is clear and moist    Eyes: Conjunctivae are normal  No scleral icterus  Neck: Neck supple  No JVD present  Cardiovascular: Normal rate, regular rhythm and normal heart sounds  Pulmonary/Chest: Effort normal and breath sounds normal  No respiratory distress  He has no wheezes  He has no rales  Abdominal: Soft  There is no tenderness  Musculoskeletal: He exhibits no edema  Neurological: He is alert and oriented to person, place, and time  Skin: Skin is warm and dry  No rash noted  Psychiatric: He has a normal mood and affect   His behavior is normal    Nursing note and vitals reviewed  Medications:    Current Outpatient Prescriptions:     aspirin 81 MG tablet, Take 81 mg by mouth daily  , Disp: , Rfl:     carvedilol (COREG) 12 5 mg tablet, Take 12 5 mg by mouth 2 (two) times a day  , Disp: , Rfl:     chlorthalidone 25 mg tablet, Take 25 mg by mouth daily, Disp: , Rfl:     NIFEdipine ER (ADALAT CC) 30 MG 24 hr tablet, Take 30 mg by mouth 2 (two) times a day, Disp: , Rfl:     potassium chloride (K-DUR) 10 mEq tablet, Take by mouth 2 (two) times a day, Disp: , Rfl:     pantoprazole (PROTONIX) 40 mg tablet, , Disp: , Rfl:     Laboratory Results:  Results for orders placed or performed in visit on 02/19/18   CBC   Result Value Ref Range    WBC 4 97 4 31 - 10 16 Thousand/uL    RBC 4 79 3 88 - 5 62 Million/uL    Hemoglobin 15 9 12 0 - 17 0 g/dL    Hematocrit 44 7 36 5 - 49 3 %    MCV 93 82 - 98 fL    MCH 33 2 26 8 - 34 3 pg    MCHC 35 6 31 4 - 37 4 g/dL    RDW 11 9 11 6 - 15 1 %    Platelets 590 468 - 599 Thousands/uL    MPV 10 8 8 9 - 12 7 fL   Microalbumin / creatinine urine ratio   Result Value Ref Range    Creatinine, Ur 90 4 mg/dL    Microalbum  ,U,Random 8 5 0 0 - 20 0 mg/L    Microalb Creat Ratio 9 0 - 30 mg/g creatinine   Renal function panel   Result Value Ref Range    Albumin 3 8 3 5 - 5 0 g/dL    Calcium 9 3 8 3 - 10 1 mg/dL    Phosphorus 2 8 2 7 - 4 5 mg/dL    BUN 16 5 - 25 mg/dL    Creatinine 0 95 0 60 - 1 30 mg/dL    Sodium 142 136 - 145 mmol/L    Potassium 4 0 3 5 - 5 3 mmol/L    Chloride 104 100 - 108 mmol/L    CO2 33 (H) 21 - 32 mmol/L    Anion Gap 5 4 - 13 mmol/L    eGFR 94 ml/min/1 73sq m    Glucose, Fasting 81 65 - 99 mg/dL   Uric acid   Result Value Ref Range    Uric Acid 3 6 (L) 4 2 - 8 0 mg/dL   Magnesium   Result Value Ref Range    Magnesium 2 3 1 6 - 2 6 mg/dL

## 2018-02-23 NOTE — LETTER
February 23, 2018     Amairani Del Castillo MD  [address]  [address]    Patient: Efraín Duarte   YOB: 1970   Date of Visit: 2/23/2018     Dear Dr Edith Monge      Thank you for referring Efraín Duarte to me for evaluation  Below are the relevant portions of my assessment and plan of care  If you have questions, please do not hesitate to call me  I look forward to following Brandin Whaley along with you  Sincerely,        Monique Aceves,         CC: No Recipients    Progress Notes:    ASSESSMENT and PLAN:  1  Polycystic kidney disease, renal function remains stable  2  Hypertension, under relatively good control    · Overall renal function remains stable, electrolytes are within normal range, continue with potassium supplementation  · Hypertension is under good control as well, continue with current regimen  · Plan to see me in approximately 6 months with repeat laboratory studies at that time  · Will also need periodic renal ultrasound given bilateral complex cysts

## 2018-02-23 NOTE — PATIENT INSTRUCTIONS
ASSESSMENT and PLAN:  1  Polycystic kidney disease, renal function remains stable  2  Hypertension, under relatively good control    · Overall renal function remains stable, electrolytes are within normal range, continue with potassium supplementation  · Hypertension is under good control as well, continue with current regimen  · Plan to see me in approximately 6 months with repeat laboratory studies at that time  · Will also need periodic renal ultrasound given bilateral complex cysts

## 2018-02-26 ENCOUNTER — TELEPHONE (OUTPATIENT)
Dept: GASTROENTEROLOGY | Facility: CLINIC | Age: 48
End: 2018-02-26

## 2018-02-26 ENCOUNTER — HOSPITAL ENCOUNTER (OUTPATIENT)
Dept: MRI IMAGING | Facility: HOSPITAL | Age: 48
Discharge: HOME | End: 2018-02-26
Attending: PSYCHIATRY & NEUROLOGY
Payer: COMMERCIAL

## 2018-02-26 DIAGNOSIS — H53.19 OSCILLOPSIA: ICD-10-CM

## 2018-02-26 DIAGNOSIS — K63.9 MURAL THICKENING OF SMALL INTESTINE: ICD-10-CM

## 2018-02-26 DIAGNOSIS — IMO0001: ICD-10-CM

## 2018-02-26 DIAGNOSIS — K52.9 CHRONIC DIARRHEA: Primary | ICD-10-CM

## 2018-02-26 PROCEDURE — 70553 MRI BRAIN STEM W/O & W/DYE: CPT

## 2018-02-26 PROCEDURE — A9585 GADOBUTROL INJECTION: HCPCS | Performed by: PSYCHIATRY & NEUROLOGY

## 2018-02-26 RX ADMIN — GADOBUTROL 6 ML: 604.72 INJECTION INTRAVENOUS at 19:00

## 2018-02-26 NOTE — TELEPHONE ENCOUNTER
----- Message from Ilsa Barker MA sent at 2/26/2018 11:49 AM EST -----  Regarding: active symptoms  Patient called in stating he is having mucous in his stool, foul smelling with upper abdominal pain  I am going to speak with Dr Bill Lawson about labs he mentioned at his office appointment  If you could just call the patient and possibly figure out what he should do, he would greatly appreciate it  Thank you!

## 2018-02-26 NOTE — TELEPHONE ENCOUNTER
DR Miley Hatfield' PT    Pt called to fu on his orders for his fu test after the office visit  Please assist with the orders if any

## 2018-03-12 ENCOUNTER — TELEPHONE (OUTPATIENT)
Dept: OBGYN CLINIC | Facility: HOSPITAL | Age: 48
End: 2018-03-12

## 2018-03-12 DIAGNOSIS — M54.12 RADICULOPATHY, CERVICAL REGION: Primary | ICD-10-CM

## 2018-03-12 NOTE — TELEPHONE ENCOUNTER
Caller: patient  Call back number: [phone]  Patient's doctor: Dr Tamaar Luna    Patient called stating on 12/6 he started having pain in his neck and can not sleep  He states as soon as he lays everything goes numb  He states it is more of a ache with immobility  He is asking what to do for this   Please advise

## 2018-03-15 RX ORDER — METHYLPREDNISOLONE 4 MG/1
TABLET ORAL
Qty: 21 TABLET | Refills: 0 | Status: ON HOLD | OUTPATIENT
Start: 2018-03-15 | End: 2018-05-07

## 2018-03-15 NOTE — TELEPHONE ENCOUNTER
Patient called again asking for a return call  He states he is in a lot of pain   Please advise  Call back number: [phone]

## 2018-03-23 ENCOUNTER — LAB (OUTPATIENT)
Dept: LAB | Facility: HOSPITAL | Age: 48
End: 2018-03-23
Attending: INTERNAL MEDICINE
Payer: COMMERCIAL

## 2018-03-23 DIAGNOSIS — K52.9 CHRONIC DIARRHEA: ICD-10-CM

## 2018-03-23 PROCEDURE — 82656 EL-1 FECAL QUAL/SEMIQ: CPT

## 2018-03-23 PROCEDURE — 89055 LEUKOCYTE ASSESSMENT FECAL: CPT

## 2018-03-24 ENCOUNTER — HOSPITAL ENCOUNTER (EMERGENCY)
Facility: HOSPITAL | Age: 48
Discharge: HOME | End: 2018-03-24
Attending: EMERGENCY MEDICINE
Payer: COMMERCIAL

## 2018-03-24 VITALS
DIASTOLIC BLOOD PRESSURE: 118 MMHG | HEIGHT: 68 IN | BODY MASS INDEX: 21.22 KG/M2 | TEMPERATURE: 97.6 F | WEIGHT: 140 LBS | OXYGEN SATURATION: 99 % | SYSTOLIC BLOOD PRESSURE: 184 MMHG | RESPIRATION RATE: 20 BRPM | HEART RATE: 120 BPM
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
## 2018-03-24 PROCEDURE — 90715 TDAP VACCINE 7 YRS/> IM: CPT | Performed by: EMERGENCY MEDICINE

## 2018-03-24 PROCEDURE — 99283 EMERGENCY DEPT VISIT LOW MDM: CPT

## 2018-03-24 PROCEDURE — 90471 IMMUNIZATION ADMIN: CPT

## 2018-03-24 RX ORDER — LIDOCAINE HYDROCHLORIDE 10 MG/ML
5 INJECTION, SOLUTION EPIDURAL; INFILTRATION; INTRACAUDAL; PERINEURAL ONCE
Status: COMPLETED | OUTPATIENT
Start: 2018-03-24 | End: 2018-03-24

## 2018-03-24 RX ORDER — BACITRACIN, NEOMYCIN, POLYMYXIN B 400; 3.5; 5 [USP'U]/G; MG/G; [USP'U]/G
1 OINTMENT TOPICAL ONCE
Status: COMPLETED | OUTPATIENT
Start: 2018-03-24 | End: 2018-03-24

## 2018-03-24 RX ORDER — AMOXICILLIN AND CLAVULANATE POTASSIUM 875; 125 MG/1; MG/1
1 TABLET, FILM COATED ORAL EVERY 12 HOURS
Qty: 20 TABLET | Refills: 0 | Status: SHIPPED | OUTPATIENT
Start: 2018-03-24 | End: 2018-04-03

## 2018-03-24 RX ADMIN — BACITRACIN, NEOMYCIN, POLYMYXIN B 1 SMALL APPLICATION: 400; 3.5; 5 OINTMENT TOPICAL at 11:02

## 2018-03-24 RX ADMIN — LIDOCAINE HYDROCHLORIDE 5 ML: 10 INJECTION, SOLUTION EPIDURAL; INFILTRATION; INTRACAUDAL; PERINEURAL at 10:14

## 2018-03-24 RX ADMIN — TETANUS TOXOID, REDUCED DIPHTHERIA TOXOID AND ACELLULAR PERTUSSIS VACCINE, ADSORBED 0.5 ML: 5; 2.5; 8; 8; 2.5 SUSPENSION INTRAMUSCULAR at 10:12

## 2018-03-24 NOTE — ED NOTES
Wound cleansed, flushed with sterile water and cover with DSD  No active bleeding       Addis Macias RN  03/24/18 [id number]

## 2018-03-24 NOTE — ED PROVIDER NOTES
History  Chief Complaint   Patient presents with    Dog Bite     Reports dog bit him this morning  Reports dog is up to date on shots, unknown last tetanus shot  Dog bite to left wrist approx 3cm long     Pt 's dog bit him in the L wrist when they were playing around  Pt 's dog is utd on shots  Prior to Admission Medications   Prescriptions Last Dose Informant Patient Reported? Taking? Methylprednisolone 4 MG TBPK   No Yes   Simg PO on day 1, then decrease by 4mg/day x 5 days per dose pack instructions  NIFEdipine ER (ADALAT CC) 30 MG 24 hr tablet  Self Yes Yes   Sig: Take 30 mg by mouth 2 (two) times a day   aspirin 81 MG tablet  Self Yes Yes   Sig: Take 81 mg by mouth daily  carvedilol (COREG) 12 5 mg tablet  Self Yes Yes   Sig: Take 12 5 mg by mouth 2 (two) times a day     chlorthalidone 25 mg tablet  Self Yes Yes   Sig: Take 25 mg by mouth daily   potassium chloride (K-DUR) 10 mEq tablet  Self Yes Yes   Sig: Take by mouth 2 (two) times a day      Facility-Administered Medications: None       Past Medical History:   Diagnosis Date    [facility] )     Abdominal pain     Abnormal weight loss     Anxiety     Chronic diarrhea     CKD (chronic kidney disease)     Concussion     Dysphagia     Epilepsy ([facility] )     GERD (gastroesophageal reflux disease)     Hypertension     Hypokalemia     Irregular heart beat     Irritable bowel syndrome     Lupus anticoagulant disorder (HCC)     Memory loss     Renal disorder     stage 3 renal failure       Past Surgical History:   Procedure Laterality Date    COLONOSCOPY      FRACTURE SURGERY      HERNIA REPAIR      KNEE ARTHROSCOPY      ND ESOPHAGOGASTRODUODENOSCOPY TRANSORAL DIAGNOSTIC N/A 2017    Procedure: EGD WITH PUSH ENTROSCOPE AND COLONOSCOPY;  Surgeon: Robby Jean-Baptiste MD;  Location: [facility];   Service: Gastroenterology    SHOULDER SURGERY         Family History   Problem Relation Age of Onset    No Known Problems Mother     No Known Problems Father      I have reviewed and agree with the history as documented  Social History   Substance Use Topics    Smoking status: Current Every Day Smoker     Packs/day: 1 00     Types: Cigarettes    Smokeless tobacco: Never Used    Alcohol use Yes      Comment: socially        Review of Systems   Constitutional: Negative for fever  Respiratory: Negative for shortness of breath  Cardiovascular: Negative for chest pain  Gastrointestinal: Negative for abdominal pain  Skin: Positive for wound  Neurological: Negative for weakness  Physical Exam  ED Triage Vitals [03/24/18 0946]   Temperature Pulse Respirations Blood Pressure SpO2   97 6 °F (36 4 °C) (!) 120 20 (!) 184/118 99 %      Temp Source Heart Rate Source Patient Position - Orthostatic VS BP Location FiO2 (%)   Temporal Monitor -- Right arm --      Pain Score       5           Orthostatic Vital Signs  Vitals:    03/24/18 0946   BP: (!) 184/118   Pulse: (!) 120       Physical Exam   Constitutional: He is oriented to person, place, and time  He appears well-developed and well-nourished  HENT:   Head: Normocephalic and atraumatic  Neck: Normal range of motion  Neck supple  Musculoskeletal: Normal range of motion  Neurological: He is alert and oriented to person, place, and time  Skin:   L wrist dorsal aspect +3 cm laceration, bleeding controlled   Nursing note and vitals reviewed        ED Medications  Medications   neomycin-bacitracin-polymyxin b (NEOSPORIN) ointment 1 small application (not administered)   tetanus-diphtheria-acellular pertussis (BOOSTRIX) IM injection 0 5 mL (0 5 mL Intramuscular Given 3/24/18 1012)   lidocaine (PF) (XYLOCAINE-MPF) 1 % injection 5 mL (5 mL Infiltration Given by Other 3/24/18 1014)       Diagnostic Studies  Results Reviewed     None                 No orders to display              Procedures  Lac Repair  Date/Time: 3/24/2018 10:40 AM  Performed by: IVA Gee R  Authorized by: IVA Henderson   Consent: Verbal consent obtained  Consent given by: patient  Patient identity confirmed: verbally with patient and arm band  Location: L posterior wrist   Laceration length: 3 cm  Tendon involvement: none  Nerve involvement: none  Vascular damage: no  Anesthesia: local infiltration    Anesthesia:  Local Anesthetic: lidocaine 1% without epinephrine (5mL)    Wound Dehiscence:  Superficial Wound Dehiscence: simple closure      Procedure Details:  Irrigation solution: saline  Amount of cleaning: extensive  Debridement: none  Skin closure: 5-0 nylon  Number of sutures: 3  Technique: simple  Approximation: close  Approximation difficulty: simple  Dressing: antibiotic ointment  Patient tolerance: Patient tolerated the procedure well with no immediate complications             Phone Contacts  ED Phone Contact    ED Course  ED Course                                MDM  CritCare Time    Disposition  Final diagnoses:   Dog bite, initial encounter     Time reflects when diagnosis was documented in both MDM as applicable and the Disposition within this note     Time User Action Codes Description Comment    3/24/2018 10:29 AM Samra Marcelino Add [R34  0XXA] Dog bite, initial encounter       ED Disposition     ED Disposition Condition Comment    Discharge  Moises Mckeon discharge to home/self care  Condition at discharge: Stable        Follow-up Information     Follow up With Specialties Details Why Joie Davalos MD Internal Medicine   [address]          Patient's Medications   Discharge Prescriptions    AMOXICILLIN-CLAVULANATE (AUGMENTIN) 875-125 MG PER TABLET    Take 1 tablet by mouth every 12 (twelve) hours for 10 days       Start Date: 3/24/2018 End Date: 4/3/2018       Order Dose: 1 tablet       Quantity: 20 tablet    Refills: 0     No discharge procedures on file      ED Provider  Electronically Signed by           Scar Maravilla MD  03/24/18 8229

## 2018-03-24 NOTE — DISCHARGE INSTRUCTIONS
Animal Bite   WHAT YOU NEED TO KNOW:   Animal bite injuries range from shallow cuts to deep, life-threatening wounds  An animal can cut or puncture the skin when it bites  Your skin may be torn from your body  Your skin may swell or bruise even if the bite does not break the skin  Animal bites occur more often on the hands, arms, legs, and face  Bites from dogs and cats are the most common injuries  DISCHARGE INSTRUCTIONS:   Return to the emergency department if:   · You have a fever  · Your wound is red, swollen, and draining pus  · You see red streaks on the skin around the wound  · You can no longer move the bitten area  · Your heartbeat and breathing are much faster than usual     · You feel dizzy and confused  Contact your healthcare provider if:   · Your pain does not get better, even after you take pain medicine  · You have nightmares or flashbacks about the animal bite  · You have questions or concerns about your condition or care  Medicines: You may need any of the following:  · Antibiotics  prevent or treat a bacterial infection  · Prescription pain medicine  may be given  Ask how to take this medicine safely  · A tetanus vaccine  may be needed to prevent tetanus  Tetanus is a life-threatening bacterial infection that affects the nerves and muscles  The bacteria can be spread through animal bites  · A rabies vaccine  may be needed to prevent rabies  Rabies is a life-threatening viral infection  The virus can be spread through animal bites  · Take your medicine as directed  Contact your healthcare provider if you think your medicine is not helping or if you have side effects  Tell him of her if you are allergic to any medicine  Keep a list of the medicines, vitamins, and herbs you take  Include the amounts, and when and why you take them  Bring the list or the pill bottles to follow-up visits  Carry your medicine list with you in case of an emergency    Follow up with your healthcare provider in 1 to 2 days: You may need to return to have your stitches removed  Write down your questions so you remember to ask them during your visits  Self-care:   · Apply antibiotic ointment as directed  This helps prevent infection in minor skin wounds  It is available without a doctor's order  · Keep the wound clean and covered  Wash the wound every day with soap and water or germ-killing cleanser  Ask your healthcare provider about the kinds of bandages to use  · Apply ice on your wound  Ice helps decrease swelling and pain  Ice may also help prevent tissue damage  Use an ice pack, or put crushed ice in a plastic bag  Cover it with a towel and place it on your wound for 15 to 20 minutes every hour or as directed  · Elevate the wound area  Raise your wound above the level of your heart as often as you can  This will help decrease swelling and pain  Prop your wound on pillows or blankets to keep it elevated comfortably  Prevent another animal bite:   · Learn to recognize the signs of a scared or angry pet  Avoid quick, sudden movements  · Do not step between animals that are fighting  · Do not leave a pet alone with a young child  · Do not disturb an animal while it eats, sleeps, or cares for its young  · Do not approach an animal you do not know, especially one that is tied up or caged  · Stay away from animals that seem sick or act strangely  · Do not feed or capture wild animals  © 2017 [address] Information is for End User's use only and may not be sold, redistributed or otherwise used for commercial purposes  All illustrations and images included in CareNotes® are the copyrighted property of [employer]  or [address]  The above information is an  only  It is not intended as medical advice for individual conditions or treatments   Talk to your doctor, nurse or pharmacist before following any medical regimen to see if it is safe and effective for you

## 2018-03-27 LAB
ELASTASE PANC STL-MCNT: >500 UG ELAST./G
WBC SPEC QL GRAM STN: NORMAL

## 2018-03-27 NOTE — TELEPHONE ENCOUNTER
Pt called because of constant diarrhea, rapid weight lost, under rib abd pain while on meds  Pt need advise

## 2018-03-27 NOTE — TELEPHONE ENCOUNTER
Spoke with patient  H/O diarrhea  Recently went for fecal leuk (normal), and fecal pancreatic elastase(in process)  Patient had 12/29/2017 an upper endoscopy, push enteroscopy, and colonoscopy that were notable for a hiatal hernia, diverticulosis, and a hyperplastic polyp    Patient continues to c/o diarrhea 4-5 BM daily  He is utilizing imodium QID, and Fodmap diet  Previously suggested if patients symptoms persist we could consider a capsule endoscopy or CT enterography  Would you like to order one of these at this time?

## 2018-03-29 NOTE — PROGRESS NOTES
My medical assistant will call him with his results  His stool WBC was negative, but his fecal elastase was low indicating possible pancreatic insufficiency  He should eat a lowfat diet and follow up in our office   We will discuss further at his next visit - including medical treatment if his symptoms persist

## 2018-03-30 ENCOUNTER — TRANSCRIBE ORDERS (OUTPATIENT)
Dept: ADMINISTRATIVE | Facility: HOSPITAL | Age: 48
End: 2018-03-30

## 2018-04-02 ENCOUNTER — HOSPITAL ENCOUNTER (OUTPATIENT)
Dept: CT IMAGING | Facility: HOSPITAL | Age: 48
Discharge: HOME | End: 2018-04-02

## 2018-04-02 DIAGNOSIS — K52.9 CHRONIC DIARRHEA: ICD-10-CM

## 2018-04-02 DIAGNOSIS — K63.9 MURAL THICKENING OF SMALL INTESTINE: ICD-10-CM

## 2018-04-05 ENCOUNTER — HOSPITAL ENCOUNTER (OUTPATIENT)
Dept: CT IMAGING | Facility: HOSPITAL | Age: 48
Discharge: HOME | End: 2018-04-05
Payer: COMMERCIAL

## 2018-04-05 PROCEDURE — 74177 CT ABD & PELVIS W/CONTRAST: CPT

## 2018-04-05 RX ADMIN — IOHEXOL 100 ML: 350 INJECTION, SOLUTION INTRAVENOUS at 18:41

## 2018-04-11 ENCOUNTER — TELEPHONE (OUTPATIENT)
Dept: GASTROENTEROLOGY | Facility: CLINIC | Age: 48
End: 2018-04-11

## 2018-04-11 NOTE — TELEPHONE ENCOUNTER
LMOM relayed information  Patients OV is scheduled for 5/9  If there is is a sooner appointment could we please move it up for him?     Thank you

## 2018-04-11 NOTE — TELEPHONE ENCOUNTER
----- Message from Monika Cook PA-C sent at 4/10/2018  7:15 PM EDT -----  Please let patient know CT scan shows diverticulosis but NO evidence of inflammation or Crohn's disease, good news  CT also shows stable cysts and calcifications in the kidney as well as some fatty deposits in the liver, but nothing worrisome

## 2018-04-25 ENCOUNTER — OFFICE VISIT (OUTPATIENT)
Dept: GASTROENTEROLOGY | Facility: CLINIC | Age: 48
End: 2018-04-25
Payer: COMMERCIAL

## 2018-04-25 VITALS
HEART RATE: 70 BPM | HEIGHT: 68 IN | DIASTOLIC BLOOD PRESSURE: 89 MMHG | SYSTOLIC BLOOD PRESSURE: 148 MMHG | BODY MASS INDEX: 21.31 KG/M2 | WEIGHT: 140.6 LBS | TEMPERATURE: 97 F

## 2018-04-25 DIAGNOSIS — K52.9 CHRONIC DIARRHEA: ICD-10-CM

## 2018-04-25 DIAGNOSIS — R63.4 ABNORMAL WEIGHT LOSS: ICD-10-CM

## 2018-04-25 DIAGNOSIS — K21.9 GASTROESOPHAGEAL REFLUX DISEASE WITHOUT ESOPHAGITIS: ICD-10-CM

## 2018-04-25 DIAGNOSIS — K86.89 PANCREATIC INSUFFICIENCY: ICD-10-CM

## 2018-04-25 DIAGNOSIS — K58.0 IRRITABLE BOWEL SYNDROME WITH DIARRHEA: Primary | ICD-10-CM

## 2018-04-25 PROCEDURE — 99244 OFF/OP CNSLTJ NEW/EST MOD 40: CPT | Performed by: INTERNAL MEDICINE

## 2018-04-25 RX ORDER — DICYCLOMINE HCL 20 MG
20 TABLET ORAL 3 TIMES DAILY PRN
Qty: 60 TABLET | Refills: 3 | Status: SHIPPED | OUTPATIENT
Start: 2018-04-25

## 2018-04-25 NOTE — PROGRESS NOTES
[facility] - Outpatient Follow-up Note  George Downs 49 y o  male MRN: [id number]  Encounter: [id number]          ASSESSMENT AND PLAN:      1  Irritable bowel syndrome with diarrhea  2  Chronic diarrhea  3  Gastroesophageal reflux disease without esophagitis  4  Abnormal weight loss  5  Pancreatic insufficiency  He most likely has a combination of diarrhea predominant irritable bowel syndrome and chronic pancreatitis from his smoking history  I have asked him to continue the Imodium up to 4 times a day and will add dicyclomine as needed  I will also give him a trial of pancreatic enzyme supplementation and schedule him for an endoscopic ultrasound to confirm chronic pancreatitis and rule out pancreatic cancer  I have asked him to continue a low-fat diet  - Case request operating room: RADIAL ENDOSCOPIC U/S  - dicyclomine (BENTYL) 20 mg tablet; Take 1 tablet (20 mg total) by mouth 3 (three) times a day as needed (pain)  Dispense: 60 tablet; Refill: 3    ______________________________________________________________________    SUBJECTIVE:  He presents for follow-up of his abdominal pain, diarrhea, and reflux  His fecal all asked days was low suggesting possible pancreatic insufficiency  He does smoke cigarettes but denies any heavy alcohol abuse  His symptoms have improved a little with Imodium but he continues to have the pain  The last three days he feels his symptoms have been a little bit better  In general he has been doing a low-fat diet  He has gained a few lb since his last visit      Answers for HPI/ROS submitted by the patient on 4/24/2018   Abdominal pain  Chronicity: chronic  Onset: more than 1 year ago  Onset quality: sudden  Frequency: daily  Episode duration: 2 weeks  Progression since onset: gradually worsening  Pain location: epigastric region  Pain - numeric: 8/10  Pain quality: a sensation of fullness  Radiates to: LUQ, RUQ, epigastric region  anorexia: Yes  arthralgias: No  belching: No  constipation: No  diarrhea: Yes  dysuria: No  fever: No  flatus: Yes  frequency: Yes  headaches: No  hematochezia: No  hematuria: No  melena: No  myalgias: No  nausea: Yes  weight loss: Yes  vomiting: No  Aggravated by: being still, certain positions, coughing, eating, movement, palpation, vomiting  Relieved by: certain positions, standing  Diagnostic workup: CT scan, lower endoscopy, upper endoscopy        REVIEW OF SYSTEMS IS OTHERWISE NEGATIVE  Historical Information   Past Medical History:   Diagnosis Date    A-fib ([address] )     Abdominal pain     Abnormal weight loss     Anxiety     Chronic diarrhea     CKD (chronic kidney disease)     Concussion     Dysphagia     Epilepsy (HCC)     GERD (gastroesophageal reflux disease)     Hypertension     Hypokalemia     Irregular heart beat     Irritable bowel syndrome     Lupus anticoagulant disorder (HCC)     Memory loss     Renal disorder     stage 3 renal failure     Past Surgical History:   Procedure Laterality Date    COLONOSCOPY      FRACTURE SURGERY      HERNIA REPAIR      KNEE ARTHROSCOPY      LA ESOPHAGOGASTRODUODENOSCOPY TRANSORAL DIAGNOSTIC N/A 12/29/2017    Procedure: EGD WITH PUSH ENTROSCOPE AND COLONOSCOPY;  Surgeon: Joe Bustamante MD;  Location: [facility];   Service: Gastroenterology    SHOULDER SURGERY       Social History   History   Alcohol Use    Yes     Comment: socially     History   Drug Use No     History   Smoking Status    Current Every Day Smoker    Packs/day: 1 00    Types: Cigarettes   Smokeless Tobacco    Never Used     Family History   Problem Relation Age of Onset    No Known Problems Mother     No Known Problems Father        Meds/Allergies       Current Outpatient Prescriptions:     aspirin 81 MG tablet    carvedilol (COREG) 12 5 mg tablet    chlorthalidone 25 mg tablet    Methylprednisolone 4 MG TBPK    NIFEdipine ER (ADALAT CC) 30 MG 24 hr tablet    potassium chloride (K-DUR) 10 mEq tablet    dicyclomine (BENTYL) 20 mg tablet    Allergies   Allergen Reactions    Codeine Headache     Reaction Date: 14Sep2011;            Objective     Blood pressure 148/89, pulse 70, temperature (!) 97 °F (36 1 °C), temperature source Tympanic, height 5' 8" (1 727 m), weight 63 8 kg (140 lb 9 6 oz)  Body mass index is 21 38 kg/m²  PHYSICAL EXAM:      General Appearance:   Alert, cooperative, no distress, thin   HEENT:   Normocephalic, atraumatic, anicteric      Neck:  Supple, symmetrical, trachea midline   Lungs:   Clear to auscultation bilaterally; no rales, rhonchi or wheezing; respirations unlabored    Heart[de-identified]   Regular rate and rhythm; no murmur, rub, or gallop  Abdomen:   Soft, epigastric tenderness, non-distended; normal bowel sounds; no masses, no organomegaly    Genitalia:   Deferred    Rectal:   Deferred    Extremities:  No cyanosis, clubbing or edema    Pulses:  2+ and symmetric    Skin:  No jaundice, rashes, or lesions    Lymph nodes:  No palpable cervical lymphadenopathy        Lab Results:   No visits with results within 1 Day(s) from this visit  Latest known visit with results is:   Lab on 03/23/2018   Component Date Value    White Blood Cells, Stool 03/23/2018 No white blood cells seen   Pancreatic Elastase-1 03/23/2018 >500          Radiology Results:   Ct Small Bowel Enterography    Result Date: 4/10/2018  Narrative: CT ABDOMEN AND PELVIS WITH IV CONTRAST- ENTEROGRAPHY - WITH CONTRAST INDICATION:   K52 9: Noninfective gastroenteritis and colitis, unspecified K63 9: Disease of intestine, unspecified  A prior CT of the abdomen from 5/10/2017 had described small bowel wall thickening  Clinical suspicion of Crohn's disease   COMPARISON:  5/10/2017 and 5/1/2017 TECHNIQUE:  Contrast-enhanced CT examination of the abdomen and pelvis was performed utilizing thin section technique and after the administration of low density enteric contrast according to protocol designed specifically to obtain sensitive evaluation of the small bowel  Axial, sagittal, and coronal 2D reformatted images were created from the source data and submitted for interpretation  Radiation dose length product (DLP) for this visit:  422 mGy-cm   This examination, like all CT scans performed in the [facility], was performed utilizing techniques to minimize radiation dose exposure, including the use of iterative reconstruction and automated exposure control  IV Contrast:  100 mL of iohexol (OMNIPAQUE) Enteric Contrast:  The volume of low density enteric contrast which was administered was not documented  FINDINGS: ABDOMEN SMALL BOWEL:  There is no small bowel wall thickening, abnormal bowel wall enhancement, or mesenteric inflammatory process  Some of the small bowel loops, proximally, are not optimally distended by fluid which simulates the appearance of wall thickening, however, in the regions which are able to be assessed, the bowel wall does not appear abnormal   The terminal ileum appears within normal limits  Small bowel is normal in caliber  LARGE BOWEL:  Normal caliber  No focal wall thickening or pericolonic inflammatory change  There are a few sigmoid diverticula visible  There appears to be a small metallic density along the wall of the descending colon on image 158 series 2  Whether  this represents a surgical clip or a small ingested foreign body is uncertain  Another very high density structure seen in the lumen of the colon at the splenic flecture  LOWER CHEST:  No significant abnormality in the lung bases  STOMACH:  Unremarkable  LIVER/BILIARY TREE:  No visible liver mass or biliary dilatation  There is fatty infiltration noted  GALLBLADDER:  No calcified gallstones  No pericholecystic inflammatory change  SPLEEN:  Unremarkable  PANCREAS:  Unremarkable  ADRENAL GLANDS:  Unremarkable   KIDNEYS/URETERS:  Numerous bilateral renal cystic lesions and scattered renal calcifications are again seen  Overall appearance is similar to the prior study although precise comparison is limited due to differences in timing of contrast   There does not appear to be any hydronephrosis  There are no solid enhancing kidney masses appreciated  No perinephric fluid  PELVIS REPRODUCTIVE ORGANS:  Unremarkable for patient's age  URINARY BLADDER:  Unremarkable  APPENDIX:  No findings to suggest appendicitis  ABDOMINOPELVIC CAVITY:  No ascites or free air  Retroperitoneal lymph nodes to the left of the aorta are stable from prior study, probably benign findings  VESSELS:  Unremarkable for patient age  ABDOMINAL WALL/INGUINAL REGIONS:  Unremarkable  OSSEOUS STRUCTURES:  There is severe chronic disc degeneration at the lumbosacral junction  No acute bone pathology seen  Impression: Other than minimal sigmoid diverticulosis, the bowel is unremarkable  No CT findings to suggest Crohn's disease  Exam is slightly limited by incomplete luminal distention of some of the proximal small bowel loops  There is fatty liver  There are numerous renal cystic lesions and renal calculi  Findings similar to prior   Workstation performed: [id number]

## 2018-04-25 NOTE — LETTER
April 25, 2018     Shraddha Lehman MD  [id number]     Patient: Arcadio Hill   YOB: 1970   Date of Visit: 4/25/2018       Dear Dr Jose Encinas: Thank you for referring Arcadio Hill to me for evaluation  Below are my notes for this consultation  If you have questions, please do not hesitate to call me  I look forward to following your patient along with you  Sincerely,        Tiki rBannon MD        CC: No Recipients  Tiki Brannon MD  4/25/2018  1:55 PM  Sign at close encounter  [facility] - Outpatient Follow-up Note  Arcadio Hill 49 y o  male MRN: [id number]  Encounter: [id number]          ASSESSMENT AND PLAN:      1  Irritable bowel syndrome with diarrhea  2  Chronic diarrhea  3  Gastroesophageal reflux disease without esophagitis  4  Abnormal weight loss  5  Pancreatic insufficiency  He most likely has a combination of diarrhea predominant irritable bowel syndrome and chronic pancreatitis from his smoking history  I have asked him to continue the Imodium up to 4 times a day and will add dicyclomine as needed  I will also give him a trial of pancreatic enzyme supplementation and schedule him for an endoscopic ultrasound to confirm chronic pancreatitis and rule out pancreatic cancer  I have asked him to continue a low-fat diet  - Case request operating room: RADIAL ENDOSCOPIC U/S  - dicyclomine (BENTYL) 20 mg tablet; Take 1 tablet (20 mg total) by mouth 3 (three) times a day as needed (pain)  Dispense: 60 tablet; Refill: 3    ______________________________________________________________________    SUBJECTIVE:  He presents for follow-up of his abdominal pain, diarrhea, and reflux  His fecal all asked days was low suggesting possible pancreatic insufficiency  He does smoke cigarettes but denies any heavy alcohol abuse  His symptoms have improved a little with Imodium but he continues to have the pain   The last three days he feels his symptoms have been a little bit better  In general he has been doing a low-fat diet  He has gained a few lb since his last visit  Answers for HPI/ROS submitted by the patient on 4/24/2018   Abdominal pain  Chronicity: chronic  Onset: more than 1 year ago  Onset quality: sudden  Frequency: daily  Episode duration: 2 weeks  Progression since onset: gradually worsening  Pain location: epigastric region  Pain - numeric: 8/10  Pain quality: a sensation of fullness  Radiates to: LUQ, RUQ, epigastric region  anorexia: Yes  arthralgias: No  belching: No  constipation: No  diarrhea: Yes  dysuria: No  fever: No  flatus: Yes  frequency: Yes  headaches: No  hematochezia: No  hematuria: No  melena: No  myalgias: No  nausea: Yes  weight loss: Yes  vomiting: No  Aggravated by: being still, certain positions, coughing, eating, movement, palpation, vomiting  Relieved by: certain positions, standing  Diagnostic workup: CT scan, lower endoscopy, upper endoscopy        REVIEW OF SYSTEMS IS OTHERWISE NEGATIVE  Historical Information   Past Medical History:   Diagnosis Date    [facility]     Abdominal pain     Abnormal weight loss     Anxiety     Chronic diarrhea     CKD (chronic kidney disease)     Concussion     Dysphagia     Epilepsy (HCC)     GERD (gastroesophageal reflux disease)     Hypertension     Hypokalemia     Irregular heart beat     Irritable bowel syndrome     Lupus anticoagulant disorder (HCC)     Memory loss     Renal disorder     stage 3 renal failure     Past Surgical History:   Procedure Laterality Date    COLONOSCOPY      FRACTURE SURGERY      HERNIA REPAIR      KNEE ARTHROSCOPY      HI ESOPHAGOGASTRODUODENOSCOPY TRANSORAL DIAGNOSTIC N/A 12/29/2017    Procedure: EGD WITH PUSH ENTROSCOPE AND COLONOSCOPY;  Surgeon: Riley Rincon MD;  Location: [facility];   Service: Gastroenterology    SHOULDER SURGERY       Social History   History   Alcohol Use    Yes Comment: socially     History   Drug Use No     History   Smoking Status    Current Every Day Smoker    Packs/day: 1 00    Types: Cigarettes   Smokeless Tobacco    Never Used     Family History   Problem Relation Age of Onset    No Known Problems Mother     No Known Problems Father        Meds/Allergies       Current Outpatient Prescriptions:     aspirin 81 MG tablet    carvedilol (COREG) 12 5 mg tablet    chlorthalidone 25 mg tablet    Methylprednisolone 4 MG TBPK    NIFEdipine ER (ADALAT CC) 30 MG 24 hr tablet    potassium chloride (K-DUR) 10 mEq tablet    dicyclomine (BENTYL) 20 mg tablet    Allergies   Allergen Reactions    Codeine Headache     Reaction Date: 14Sep2011;            Objective     Blood pressure 148/89, pulse 70, temperature (!) 97 °F (36 1 °C), temperature source Tympanic, height 5' 8" (1 727 m), weight 63 8 kg (140 lb 9 6 oz)  Body mass index is 21 38 kg/m²  PHYSICAL EXAM:      General Appearance:   Alert, cooperative, no distress, thin   HEENT:   Normocephalic, atraumatic, anicteric      Neck:  Supple, symmetrical, trachea midline   Lungs:   Clear to auscultation bilaterally; no rales, rhonchi or wheezing; respirations unlabored    Heart[de-identified]   Regular rate and rhythm; no murmur, rub, or gallop  Abdomen:   Soft, epigastric tenderness, non-distended; normal bowel sounds; no masses, no organomegaly    Genitalia:   Deferred    Rectal:   Deferred    Extremities:  No cyanosis, clubbing or edema    Pulses:  2+ and symmetric    Skin:  No jaundice, rashes, or lesions    Lymph nodes:  No palpable cervical lymphadenopathy        Lab Results:   No visits with results within 1 Day(s) from this visit  Latest known visit with results is:   Lab on 03/23/2018   Component Date Value    White Blood Cells, Stool 03/23/2018 No white blood cells seen       Pancreatic Elastase-1 03/23/2018 >500          Radiology Results:   Ct Small Bowel Enterography    Result Date: 4/10/2018  Narrative: CT ABDOMEN AND PELVIS WITH IV CONTRAST- ENTEROGRAPHY - WITH CONTRAST INDICATION:   K52 9: Noninfective gastroenteritis and colitis, unspecified K63 9: Disease of intestine, unspecified  A prior CT of the abdomen from 5/10/2017 had described small bowel wall thickening  Clinical suspicion of Crohn's disease  COMPARISON:  5/10/2017 and 5/1/2017 TECHNIQUE:  Contrast-enhanced CT examination of the abdomen and pelvis was performed utilizing thin section technique and after the administration of low density enteric contrast according to protocol designed specifically to obtain sensitive evaluation of the small bowel  Axial, sagittal, and coronal 2D reformatted images were created from the source data and submitted for interpretation  Radiation dose length product (DLP) for this visit:  422 mGy-cm   This examination, like all CT scans performed in the [facility], was performed utilizing techniques to minimize radiation dose exposure, including the use of iterative reconstruction and automated exposure control  IV Contrast:  100 mL of iohexol (OMNIPAQUE) Enteric Contrast:  The volume of low density enteric contrast which was administered was not documented  FINDINGS: ABDOMEN SMALL BOWEL:  There is no small bowel wall thickening, abnormal bowel wall enhancement, or mesenteric inflammatory process  Some of the small bowel loops, proximally, are not optimally distended by fluid which simulates the appearance of wall thickening, however, in the regions which are able to be assessed, the bowel wall does not appear abnormal   The terminal ileum appears within normal limits  Small bowel is normal in caliber  LARGE BOWEL:  Normal caliber  No focal wall thickening or pericolonic inflammatory change  There are a few sigmoid diverticula visible  There appears to be a small metallic density along the wall of the descending colon on image 158 series 2    Whether  this represents a surgical clip or a small ingested foreign body is uncertain  Another very high density structure seen in the lumen of the colon at the splenic flecture  LOWER CHEST:  No significant abnormality in the lung bases  STOMACH:  Unremarkable  LIVER/BILIARY TREE:  No visible liver mass or biliary dilatation  There is fatty infiltration noted  GALLBLADDER:  No calcified gallstones  No pericholecystic inflammatory change  SPLEEN:  Unremarkable  PANCREAS:  Unremarkable  ADRENAL GLANDS:  Unremarkable  KIDNEYS/URETERS:  Numerous bilateral renal cystic lesions and scattered renal calcifications are again seen  Overall appearance is similar to the prior study although precise comparison is limited due to differences in timing of contrast   There does not appear to be any hydronephrosis  There are no solid enhancing kidney masses appreciated  No perinephric fluid  PELVIS REPRODUCTIVE ORGANS:  Unremarkable for patient's age  URINARY BLADDER:  Unremarkable  APPENDIX:  No findings to suggest appendicitis  ABDOMINOPELVIC CAVITY:  No ascites or free air  Retroperitoneal lymph nodes to the left of the aorta are stable from prior study, probably benign findings  VESSELS:  Unremarkable for patient age  ABDOMINAL WALL/INGUINAL REGIONS:  Unremarkable  OSSEOUS STRUCTURES:  There is severe chronic disc degeneration at the lumbosacral junction  No acute bone pathology seen  Impression: Other than minimal sigmoid diverticulosis, the bowel is unremarkable  No CT findings to suggest Crohn's disease  Exam is slightly limited by incomplete luminal distention of some of the proximal small bowel loops  There is fatty liver  There are numerous renal cystic lesions and renal calculi  Findings similar to prior   Workstation performed: [id number]

## 2018-05-07 ENCOUNTER — ANESTHESIA (OUTPATIENT)
Dept: GASTROENTEROLOGY | Facility: HOSPITAL | Age: 48
End: 2018-05-07
Payer: COMMERCIAL

## 2018-05-07 ENCOUNTER — HOSPITAL ENCOUNTER (OUTPATIENT)
Facility: HOSPITAL | Age: 48
Setting detail: OUTPATIENT SURGERY
Discharge: HOME | End: 2018-05-07
Attending: INTERNAL MEDICINE | Admitting: INTERNAL MEDICINE
Payer: COMMERCIAL

## 2018-05-07 ENCOUNTER — ANESTHESIA EVENT (OUTPATIENT)
Dept: GASTROENTEROLOGY | Facility: HOSPITAL | Age: 48
End: 2018-05-07
Payer: COMMERCIAL

## 2018-05-07 VITALS
WEIGHT: 140 LBS | TEMPERATURE: 98 F | BODY MASS INDEX: 21.22 KG/M2 | SYSTOLIC BLOOD PRESSURE: 138 MMHG | HEIGHT: 68 IN | HEART RATE: 81 BPM | RESPIRATION RATE: 20 BRPM | OXYGEN SATURATION: 98 % | DIASTOLIC BLOOD PRESSURE: 93 MMHG

## 2018-05-07 PROCEDURE — 43237 ENDOSCOPIC US EXAM ESOPH: CPT | Performed by: INTERNAL MEDICINE

## 2018-05-07 RX ORDER — SODIUM CHLORIDE 9 MG/ML
50 INJECTION, SOLUTION INTRAVENOUS CONTINUOUS
Status: DISCONTINUED | OUTPATIENT
Start: 2018-05-07 | End: 2018-05-07 | Stop reason: HOSPADM

## 2018-05-07 RX ORDER — PROPOFOL 10 MG/ML
INJECTION, EMULSION INTRAVENOUS AS NEEDED
Status: DISCONTINUED | OUTPATIENT
Start: 2018-05-07 | End: 2018-05-07 | Stop reason: SURG

## 2018-05-07 RX ORDER — GLYCOPYRROLATE 0.2 MG/ML
INJECTION INTRAMUSCULAR; INTRAVENOUS AS NEEDED
Status: DISCONTINUED | OUTPATIENT
Start: 2018-05-07 | End: 2018-05-07 | Stop reason: SURG

## 2018-05-07 RX ORDER — LABETALOL HYDROCHLORIDE 5 MG/ML
INJECTION, SOLUTION INTRAVENOUS AS NEEDED
Status: DISCONTINUED | OUTPATIENT
Start: 2018-05-07 | End: 2018-05-07 | Stop reason: SURG

## 2018-05-07 RX ORDER — PROPOFOL 10 MG/ML
INJECTION, EMULSION INTRAVENOUS CONTINUOUS PRN
Status: DISCONTINUED | OUTPATIENT
Start: 2018-05-07 | End: 2018-05-07

## 2018-05-07 RX ORDER — LIDOCAINE HYDROCHLORIDE 10 MG/ML
INJECTION, SOLUTION INFILTRATION; PERINEURAL AS NEEDED
Status: DISCONTINUED | OUTPATIENT
Start: 2018-05-07 | End: 2018-05-07 | Stop reason: SURG

## 2018-05-07 RX ADMIN — SODIUM CHLORIDE 50 ML/HR: 0.9 INJECTION, SOLUTION INTRAVENOUS at 11:17

## 2018-05-07 RX ADMIN — GLYCOPYRROLATE 0.1 MG: 0.2 INJECTION, SOLUTION INTRAMUSCULAR; INTRAVENOUS at 12:20

## 2018-05-07 RX ADMIN — LABETALOL 20 MG/4 ML (5 MG/ML) INTRAVENOUS SYRINGE 5 MG: at 12:35

## 2018-05-07 RX ADMIN — LIDOCAINE HYDROCHLORIDE 100 MG: 10 INJECTION, SOLUTION INFILTRATION; PERINEURAL at 12:26

## 2018-05-07 RX ADMIN — PROPOFOL 100 MG: 10 INJECTION, EMULSION INTRAVENOUS at 12:26

## 2018-05-07 RX ADMIN — PROPOFOL 120 MCG/KG/MIN: 10 INJECTION, EMULSION INTRAVENOUS at 12:26

## 2018-05-07 NOTE — OP NOTE
**** GI/ENDOSCOPY REPORT ****     PATIENT NAME: CATALINO LENOG - VISIT ID:  Patient ID: [id number]   YOB: 1970     INTRODUCTION: Upper Endoscopic [ULTRASOUND] - A 49 male patient presents   for an outpatient Upper Endoscopic [ULTRASOUND] at [facility]  INDICATIONS: Abdominal pain, diarrhea, and weight loss  CONSENT: The benefits, risks, and alternatives to the procedure were   discussed and informed consent was obtained from the patient  PREPARATION:  EKG, pulse, pulse oximetry and blood pressure were monitored   throughout the procedure  ASA Classification: Class 2 - Patient has mild   to moderate systemic disturbance that may or may not be related to the   disorder requiring surgery  MEDICATIONS: Anesthesia-check records     PROCEDURE:  The ultrasound gastroscope was passed with ease through the   mouth under direct visualization and advanced to the 3rd portion of the   duodenum  The scope was withdrawn and the mucosa was carefully examined  The views were good  The patient's toleration of the procedure was good  FINDINGS: Visual Exam: Limited endoscopic view with oblique viewing   echoendoscope was unremarkable  EUS EXAM: The PD was not dilated but the   PD was was hyperechoic  There were echogenic foci and strands throughout   the pancreas as well as lobularity  The CBD was not dilated and there were   no stones in the gallbladder  There were no cysts or mass lesions in the   pancreas or the examined part of the liver  There were no enlarged lymph   nodes in the celiac area  COMPLICATIONS: There were no complications  IMPRESSIONS: Chronic pancreatitis  RECOMMENDATIONS: Continue current medications, including pancreatic enzyme   supplementation  Low fat diet   Follow-up appointment with endoscopist      ESTIMATED BLOOD LOSS:     PATHOLOGY SPECIMENS:     PROCEDURE CODES:     ICD-9 Codes:     ICD-10 Codes:     PERFORMED BY: WINSTON Gil  on 05/07/2018  Version 1, electronically signed by WINSTON Noble  on 05/07/2018   at 12:57

## 2018-05-07 NOTE — ANESTHESIA POSTPROCEDURE EVALUATION
Post-Op Assessment Note      CV Status:  Stable    Mental Status:  Awake    Hydration Status:  Euvolemic    PONV Controlled:  Controlled    Airway Patency:  Patent    Post Op Vitals Reviewed: Yes          Staff: CRNA           /98 (05/07/18 1243)    Temp      Pulse 86 (05/07/18 1243)   Resp 16 (05/07/18 1243)    SpO2 96 % (05/07/18 1243)

## 2018-05-07 NOTE — ANESTHESIA PREPROCEDURE EVALUATION
Review of Systems/Medical History    Chart reviewed  History of anesthetic complications PONV    Cardiovascular  Exercise tolerance: good, Exercise comment: METS>4 Hypertension , Dysrhythmias , atrial fibrillation,    Pulmonary  Smoker ,   Comment: 1 PPD x20 years     GI/Hepatic    GERD well controlled, Pancreatic problem,        Kidney stones, Chronic kidney disease stage 3,   Comment: Polycystic Kidney Disease     Endo/Other     GYN       Hematology      Comment: Lupus Anticoagulant Musculoskeletal    Arthritis     Neurology  Seizures well controlled,  Headaches,   Comment: Temporal lobe seizures Psychology   Anxiety,              Physical Exam    Airway    Mallampati score: II  TM Distance: >3 FB  Neck ROM: full     Dental   Comment: Missing multiple teeth, none loose,     Cardiovascular      Pulmonary      Other Findings        Anesthesia Plan  ASA Score- 3     Anesthesia Type- IV sedation with anesthesia with ASA Monitors  Additional Monitors:   Airway Plan:     Comment: GA backup  Plan Factors-    Induction- intravenous  Postoperative Plan-     Informed Consent- Anesthetic plan and risks discussed with patient  I personally reviewed this patient with the CRNA  Discussed and agreed on the Anesthesia Plan with the CRNA  Dyan Jose

## 2018-05-07 NOTE — H&P (VIEW-ONLY)
[facility] - Outpatient Follow-up Note  Jacob Mari 49 y o  male MRN: [id number]  Encounter: [id number]          ASSESSMENT AND PLAN:      1  Irritable bowel syndrome with diarrhea  2  Chronic diarrhea  3  Gastroesophageal reflux disease without esophagitis  4  Abnormal weight loss  5  Pancreatic insufficiency  He most likely has a combination of diarrhea predominant irritable bowel syndrome and chronic pancreatitis from his smoking history  I have asked him to continue the Imodium up to 4 times a day and will add dicyclomine as needed  I will also give him a trial of pancreatic enzyme supplementation and schedule him for an endoscopic ultrasound to confirm chronic pancreatitis and rule out pancreatic cancer  I have asked him to continue a low-fat diet  - Case request operating room: RADIAL ENDOSCOPIC U/S  - dicyclomine (BENTYL) 20 mg tablet; Take 1 tablet (20 mg total) by mouth 3 (three) times a day as needed (pain)  Dispense: 60 tablet; Refill: 3    ______________________________________________________________________    SUBJECTIVE:  He presents for follow-up of his abdominal pain, diarrhea, and reflux  His fecal all asked days was low suggesting possible pancreatic insufficiency  He does smoke cigarettes but denies any heavy alcohol abuse  His symptoms have improved a little with Imodium but he continues to have the pain  The last three days he feels his symptoms have been a little bit better  In general he has been doing a low-fat diet  He has gained a few lb since his last visit      Answers for HPI/ROS submitted by the patient on 4/24/2018   Abdominal pain  Chronicity: chronic  Onset: more than 1 year ago  Onset quality: sudden  Frequency: daily  Episode duration: 2 weeks  Progression since onset: gradually worsening  Pain location: epigastric region  Pain - numeric: 8/10  Pain quality: a sensation of fullness  Radiates to: LUQ, RUQ, epigastric region  anorexia: Yes  arthralgias: No  belching: No  constipation: No  diarrhea: Yes  dysuria: No  fever: No  flatus: Yes  frequency: Yes  headaches: No  hematochezia: No  hematuria: No  melena: No  myalgias: No  nausea: Yes  weight loss: Yes  vomiting: No  Aggravated by: being still, certain positions, coughing, eating, movement, palpation, vomiting  Relieved by: certain positions, standing  Diagnostic workup: CT scan, lower endoscopy, upper endoscopy        REVIEW OF SYSTEMS IS OTHERWISE NEGATIVE  Historical Information   Past Medical History:   Diagnosis Date    A-fib ([address] )     Abdominal pain     Abnormal weight loss     Anxiety     Chronic diarrhea     CKD (chronic kidney disease)     Concussion     Dysphagia     Epilepsy (HCC)     GERD (gastroesophageal reflux disease)     Hypertension     Hypokalemia     Irregular heart beat     Irritable bowel syndrome     Lupus anticoagulant disorder (HCC)     Memory loss     Renal disorder     stage 3 renal failure     Past Surgical History:   Procedure Laterality Date    COLONOSCOPY      FRACTURE SURGERY      HERNIA REPAIR      KNEE ARTHROSCOPY      LA ESOPHAGOGASTRODUODENOSCOPY TRANSORAL DIAGNOSTIC N/A 12/29/2017    Procedure: EGD WITH PUSH ENTROSCOPE AND COLONOSCOPY;  Surgeon: Dionna Bowman MD;  Location: [facility];   Service: Gastroenterology    SHOULDER SURGERY       Social History   History   Alcohol Use    Yes     Comment: socially     History   Drug Use No     History   Smoking Status    Current Every Day Smoker    Packs/day: 1 00    Types: Cigarettes   Smokeless Tobacco    Never Used     Family History   Problem Relation Age of Onset    No Known Problems Mother     No Known Problems Father        Meds/Allergies       Current Outpatient Prescriptions:     aspirin 81 MG tablet    carvedilol (COREG) 12 5 mg tablet    chlorthalidone 25 mg tablet    Methylprednisolone 4 MG TBPK    NIFEdipine ER (ADALAT CC) 30 MG 24 hr tablet    potassium chloride (K-DUR) 10 mEq tablet    dicyclomine (BENTYL) 20 mg tablet    Allergies   Allergen Reactions    Codeine Headache     Reaction Date: 14Sep2011;            Objective     Blood pressure 148/89, pulse 70, temperature (!) 97 °F (36 1 °C), temperature source Tympanic, height 5' 8" (1 727 m), weight 63 8 kg (140 lb 9 6 oz)  Body mass index is 21 38 kg/m²  PHYSICAL EXAM:      General Appearance:   Alert, cooperative, no distress, thin   HEENT:   Normocephalic, atraumatic, anicteric      Neck:  Supple, symmetrical, trachea midline   Lungs:   Clear to auscultation bilaterally; no rales, rhonchi or wheezing; respirations unlabored    Heart[de-identified]   Regular rate and rhythm; no murmur, rub, or gallop  Abdomen:   Soft, epigastric tenderness, non-distended; normal bowel sounds; no masses, no organomegaly    Genitalia:   Deferred    Rectal:   Deferred    Extremities:  No cyanosis, clubbing or edema    Pulses:  2+ and symmetric    Skin:  No jaundice, rashes, or lesions    Lymph nodes:  No palpable cervical lymphadenopathy        Lab Results:   No visits with results within 1 Day(s) from this visit  Latest known visit with results is:   Lab on 03/23/2018   Component Date Value    White Blood Cells, Stool 03/23/2018 No white blood cells seen   Pancreatic Elastase-1 03/23/2018 >500          Radiology Results:   Ct Small Bowel Enterography    Result Date: 4/10/2018  Narrative: CT ABDOMEN AND PELVIS WITH IV CONTRAST- ENTEROGRAPHY - WITH CONTRAST INDICATION:   K52 9: Noninfective gastroenteritis and colitis, unspecified K63 9: Disease of intestine, unspecified  A prior CT of the abdomen from 5/10/2017 had described small bowel wall thickening  Clinical suspicion of Crohn's disease   COMPARISON:  5/10/2017 and 5/1/2017 TECHNIQUE:  Contrast-enhanced CT examination of the abdomen and pelvis was performed utilizing thin section technique and after the administration of low density enteric contrast according to protocol designed specifically to obtain sensitive evaluation of the small bowel  Axial, sagittal, and coronal 2D reformatted images were created from the source data and submitted for interpretation  Radiation dose length product (DLP) for this visit:  422 mGy-cm   This examination, like all CT scans performed in the [facility], was performed utilizing techniques to minimize radiation dose exposure, including the use of iterative reconstruction and automated exposure control  IV Contrast:  100 mL of iohexol (OMNIPAQUE) Enteric Contrast:  The volume of low density enteric contrast which was administered was not documented  FINDINGS: ABDOMEN SMALL BOWEL:  There is no small bowel wall thickening, abnormal bowel wall enhancement, or mesenteric inflammatory process  Some of the small bowel loops, proximally, are not optimally distended by fluid which simulates the appearance of wall thickening, however, in the regions which are able to be assessed, the bowel wall does not appear abnormal   The terminal ileum appears within normal limits  Small bowel is normal in caliber  LARGE BOWEL:  Normal caliber  No focal wall thickening or pericolonic inflammatory change  There are a few sigmoid diverticula visible  There appears to be a small metallic density along the wall of the descending colon on image 158 series 2  Whether  this represents a surgical clip or a small ingested foreign body is uncertain  Another very high density structure seen in the lumen of the colon at the splenic flecture  LOWER CHEST:  No significant abnormality in the lung bases  STOMACH:  Unremarkable  LIVER/BILIARY TREE:  No visible liver mass or biliary dilatation  There is fatty infiltration noted  GALLBLADDER:  No calcified gallstones  No pericholecystic inflammatory change  SPLEEN:  Unremarkable  PANCREAS:  Unremarkable  ADRENAL GLANDS:  Unremarkable   KIDNEYS/URETERS:  Numerous bilateral renal cystic lesions and scattered renal calcifications are again seen  Overall appearance is similar to the prior study although precise comparison is limited due to differences in timing of contrast   There does not appear to be any hydronephrosis  There are no solid enhancing kidney masses appreciated  No perinephric fluid  PELVIS REPRODUCTIVE ORGANS:  Unremarkable for patient's age  URINARY BLADDER:  Unremarkable  APPENDIX:  No findings to suggest appendicitis  ABDOMINOPELVIC CAVITY:  No ascites or free air  Retroperitoneal lymph nodes to the left of the aorta are stable from prior study, probably benign findings  VESSELS:  Unremarkable for patient age  ABDOMINAL WALL/INGUINAL REGIONS:  Unremarkable  OSSEOUS STRUCTURES:  There is severe chronic disc degeneration at the lumbosacral junction  No acute bone pathology seen  Impression: Other than minimal sigmoid diverticulosis, the bowel is unremarkable  No CT findings to suggest Crohn's disease  Exam is slightly limited by incomplete luminal distention of some of the proximal small bowel loops  There is fatty liver  There are numerous renal cystic lesions and renal calculi  Findings similar to prior   Workstation performed: [id number]

## 2018-05-09 ENCOUNTER — OFFICE VISIT (OUTPATIENT)
Dept: GASTROENTEROLOGY | Facility: CLINIC | Age: 48
End: 2018-05-09
Payer: COMMERCIAL

## 2018-05-09 VITALS
BODY MASS INDEX: 21.22 KG/M2 | TEMPERATURE: 97.6 F | SYSTOLIC BLOOD PRESSURE: 156 MMHG | WEIGHT: 140 LBS | DIASTOLIC BLOOD PRESSURE: 100 MMHG | HEART RATE: 69 BPM | HEIGHT: 68 IN

## 2018-05-09 DIAGNOSIS — K86.89 PANCREATIC INSUFFICIENCY: Primary | ICD-10-CM

## 2018-05-09 DIAGNOSIS — K58.0 IRRITABLE BOWEL SYNDROME WITH DIARRHEA: ICD-10-CM

## 2018-05-09 DIAGNOSIS — K21.9 GASTROESOPHAGEAL REFLUX DISEASE WITHOUT ESOPHAGITIS: ICD-10-CM

## 2018-05-09 PROCEDURE — 99214 OFFICE O/P EST MOD 30 MIN: CPT | Performed by: INTERNAL MEDICINE

## 2018-05-09 NOTE — PROGRESS NOTES
[facility] - Outpatient Follow-up Note  Russell Starr 49 y o  male MRN: [id number]  Encounter: [id number]          ASSESSMENT AND PLAN:      1  Pancreatic insufficiency  He appears to have chronic pancreatitis with exocrine pancreatic insufficiency  He has not had a chance to check with his insurance if either Creon or Pauletta Fitting is covered  I will give him prescriptions for both but asked him to use whichever one is less expensive  I have asked him to continue a low-fat diet and said we can consider a celiac plexus block in the future if he continues to have pain  He knows the importance of stopping smoking altogether and avoiding alcohol completely  I will check his lipase for evidence of acute pancreatitis and I will also check his IgG subclass four level to rule out autoimmune pancreatitis  - Pancrelipase, Lip-Prot-Amyl, (CREON) 56193 units CPEP; Take 2 capsules (72,000 Units total) by mouth 3 (three) times a day with meals  Dispense: 270 capsule; Refill: 5  - Pancrelipase, Lip-Prot-Amyl, (ZENPEP) 05536-105239 units CPEP; Take 2 capsules by mouth 3 (three) times a day with meals  Dispense: 270 capsule; Refill: 5  - Lipase; Future  - Immunoglobulin G, Subclass 4; Future    2  Irritable bowel syndrome with diarrhea  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 and can take Imodium as needed and dicyclomine as needed for this  3  Gastroesophageal reflux disease without esophagitis  He has reflux but his symptoms are well controlled with diet and lifestyle changes  ______________________________________________________________________    SUBJECTIVE:  He presents for follow-up after his recent endoscopic ultrasound  He was noted to have findings consistent with chronic pancreatitis  His risk factor for this is his chronic tobacco use but he denies any heavy alcohol abuse    He continues to complain of diarrhea but says it improved with pancreatic enzyme supplementation  He continues to have pain in his epigastrium but denies any nausea, vomiting, bleeding, or weight loss  In fact he has gained a few lb since starting the pancreatic enzyme supplements  Answers for HPI/ROS submitted by the patient on 5/7/2018   Abdominal pain  Chronicity: chronic  Onset: more than 1 year ago  Onset quality: sudden  Frequency: rarely  Episode duration: 6 weeks  Progression since onset: waxing and waning  Pain location: RUQ, periumbilical region  Pain - numeric: 7/10  Pain quality: a sensation of fullness  Radiates to: LUQ, RUQ, epigastric region  anorexia: Yes  arthralgias: Yes  belching: Yes  constipation: No  diarrhea: Yes  dysuria: No  fever: No  flatus: No  frequency: No  headaches: No  hematochezia: No  hematuria: Yes  melena: No  myalgias: No  nausea: Yes  weight loss: Yes  vomiting: No  Aggravated by: being still, certain positions, eating, palpation  Relieved by: activity, standing  Diagnostic workup: CT scan, lower endoscopy, upper endoscopy        REVIEW OF SYSTEMS IS OTHERWISE NEGATIVE  Historical Information   Past Medical History:   Diagnosis Date    [facility]     Abdominal pain     Abnormal weight loss     Anxiety     Chronic diarrhea     CKD (chronic kidney disease)     Concussion     Dysphagia     Epilepsy (HCC)     GERD (gastroesophageal reflux disease)     Hypertension     Hypokalemia     Irregular heart beat     Irritable bowel syndrome     Lupus anticoagulant disorder (HCC)     Memory loss     Pancreatic insufficiency     Renal disorder     stage 3 renal failure     Past Surgical History:   Procedure Laterality Date    COLONOSCOPY      FRACTURE SURGERY      HERNIA REPAIR      KNEE ARTHROSCOPY      GA EDG US EXAM SURGICAL ALTER STOM DUODENUM/JEJUNUM N/A 5/7/2018    Procedure: RADIAL ENDOSCOPIC U/S;  Surgeon: Delmy Hernández MD;  Location: [facility];   Service: Gastroenterology    GA ESOPHAGOGASTRODUODENOSCOPY TRANSORAL DIAGNOSTIC N/A 12/29/2017    Procedure: EGD WITH PUSH ENTROSCOPE AND COLONOSCOPY;  Surgeon: Chantel Segovia MD;  Location[facility]; Service: Gastroenterology    SHOULDER SURGERY       Social History   History   Alcohol Use    Yes     Comment: socially     History   Drug Use No     History   Smoking Status    Current Every Day Smoker    Packs/day: 1 00    Types: Cigarettes   Smokeless Tobacco    Never Used     Family History   Problem Relation Age of Onset    No Known Problems Mother     No Known Problems Father        Meds/Allergies       Current Outpatient Prescriptions:     aspirin 81 MG tablet    carvedilol (COREG) 12 5 mg tablet    chlorthalidone 25 mg tablet    dicyclomine (BENTYL) 20 mg tablet    NIFEdipine ER (ADALAT CC) 30 MG 24 hr tablet    potassium chloride (K-DUR) 10 mEq tablet    Pancrelipase, Lip-Prot-Amyl, (CREON) 87987 units CPEP    Pancrelipase, Lip-Prot-Amyl, (ZENPEP) 30255-030420 units CPEP    Allergies   Allergen Reactions    Codeine Headache     Reaction Date: [id number];            Objective     Blood pressure 156/100, pulse 69, temperature 97 6 °F (36 4 °C), temperature source Tympanic, height 5' 8" (1 727 m), weight 63 5 kg (140 lb)  Body mass index is 21 29 kg/m²  PHYSICAL EXAM:      General Appearance:   Alert, cooperative, no distress   HEENT:   Normocephalic, atraumatic, anicteric      Neck:  Supple, symmetrical, trachea midline   Lungs:   Clear to auscultation bilaterally; no rales, rhonchi or wheezing; respirations unlabored    Heart[de-identified]   Regular rate and rhythm; no murmur, rub, or gallop     Abdomen:   Soft, thin, non-tender, non-distended; normal bowel sounds; no masses, no organomegaly    Genitalia:   Deferred    Rectal:   Deferred    Extremities:  No cyanosis, clubbing or edema    Pulses:  2+ and symmetric    Skin:  No jaundice, rashes, or lesions, but has acne    Lymph nodes:  No palpable cervical lymphadenopathy        Lab Results:   No visits with results within 1 Day(s) from this visit  Latest known visit with results is:   Lab on 03/23/2018   Component Date Value    White Blood Cells, Stool 03/23/2018 No white blood cells seen   Pancreatic Elastase-1 03/23/2018 >500          Radiology Results:   No results found

## 2018-05-09 NOTE — LETTER
May 9, 2018     Petra Ryan MD  [id number]   [address] [place] [address]    Patient: Itz Sellers   YOB: 1970   Date of Visit: 5/9/2018       Dear Dr Sharda Multani: Thank you for referring Itz Sellers to me for evaluation  Below are my notes for this consultation  If you have questions, please do not hesitate to call me  I look forward to following your patient along with you  Sincerely,        Mak Haile MD        CC: No Recipients  Mak Haile MD  5/9/2018  9:07 AM  Sign at close encounter  [facility] - Outpatient Follow-up Note  Itz Sellers 49 y o  male MRN: [id number]  Encounter: [id number]          ASSESSMENT AND PLAN:      1  Pancreatic insufficiency  He appears to have chronic pancreatitis with exocrine pancreatic insufficiency  He has not had a chance to check with his insurance if either Creon or Darylene Player is covered  I will give him prescriptions for both but asked him to use whichever one is less expensive  I have asked him to continue a low-fat diet and said we can consider a celiac plexus block in the future if he continues to have pain  He knows the importance of stopping smoking altogether and avoiding alcohol completely  I will check his lipase for evidence of acute pancreatitis and I will also check his IgG subclass four level to rule out autoimmune pancreatitis  - Pancrelipase, Lip-Prot-Amyl, (CREON) 74857 units CPEP; Take 2 capsules (72,000 Units total) by mouth 3 (three) times a day with meals  Dispense: 270 capsule; Refill: 5  - Pancrelipase, Lip-Prot-Amyl, (ZENPEP) 24534-131023 units CPEP; Take 2 capsules by mouth 3 (three) times a day with meals  Dispense: 270 capsule; Refill: 5  - Lipase; Future  - Immunoglobulin G, Subclass 4; Future    2   Irritable bowel syndrome with diarrhea  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 and can take Imodium as needed and dicyclomine as needed for this     3  Gastroesophageal reflux disease without esophagitis  He has reflux but his symptoms are well controlled with diet and lifestyle changes  ______________________________________________________________________    SUBJECTIVE:  He presents for follow-up after his recent endoscopic ultrasound  He was noted to have findings consistent with chronic pancreatitis  His risk factor for this is his chronic tobacco use but he denies any heavy alcohol abuse  He continues to complain of diarrhea but says it improved with pancreatic enzyme supplementation  He continues to have pain in his epigastrium but denies any nausea, vomiting, bleeding, or weight loss  In fact he has gained a few lb since starting the pancreatic enzyme supplements  Answers for HPI/ROS submitted by the patient on 5/7/2018   Abdominal pain  Chronicity: chronic  Onset: more than 1 year ago  Onset quality: sudden  Frequency: rarely  Episode duration: 6 weeks  Progression since onset: waxing and waning  Pain location: RUQ, periumbilical region  Pain - numeric: 7/10  Pain quality: a sensation of fullness  Radiates to: LUQ, RUQ, epigastric region  anorexia: Yes  arthralgias: Yes  belching: Yes  constipation: No  diarrhea: Yes  dysuria: No  fever: No  flatus: No  frequency: No  headaches: No  hematochezia: No  hematuria: Yes  melena: No  myalgias: No  nausea: Yes  weight loss: Yes  vomiting: No  Aggravated by: being still, certain positions, eating, palpation  Relieved by: activity, standing  Diagnostic workup: CT scan, lower endoscopy, upper endoscopy        REVIEW OF SYSTEMS IS OTHERWISE NEGATIVE        Historical Information   Past Medical History:   Diagnosis Date    [facility]     Abdominal pain     Abnormal weight loss     Anxiety     Chronic diarrhea     CKD (chronic kidney disease)     Concussion     Dysphagia     Epilepsy (HCC)     GERD (gastroesophageal reflux disease)     Hypertension     Hypokalemia     Irregular heart beat     Irritable bowel syndrome     Lupus anticoagulant disorder (HCC)     Memory loss     Pancreatic insufficiency     Renal disorder     stage 3 renal failure     Past Surgical History:   Procedure Laterality Date    COLONOSCOPY      FRACTURE SURGERY      HERNIA REPAIR      KNEE ARTHROSCOPY      [place] EDG US EXAM SURGICAL ALTER STOM DUODENUM/JEJUNUM N/A 5/7/2018    Procedure: RADIAL ENDOSCOPIC U/S;  Surgeon: Aman Leos MD;  Location:  GI LAB; Service: Gastroenterology    VT ESOPHAGOGASTRODUODENOSCOPY TRANSORAL DIAGNOSTIC N/A 12/29/2017    Procedure: EGD WITH PUSH ENTROSCOPE AND COLONOSCOPY;  Surgeon: Aman Leos MD;  Location: [facility]; Service: Gastroenterology    SHOULDER SURGERY       Social History   History   Alcohol Use    Yes     Comment: socially     History   Drug Use No     History   Smoking Status    Current Every Day Smoker    Packs/day: 1 00    Types: Cigarettes   Smokeless Tobacco    Never Used     Family History   Problem Relation Age of Onset    No Known Problems Mother     No Known Problems Father        Meds/Allergies       Current Outpatient Prescriptions:     aspirin 81 MG tablet    carvedilol (COREG) 12 5 mg tablet    chlorthalidone 25 mg tablet    dicyclomine (BENTYL) 20 mg tablet    NIFEdipine ER (ADALAT CC) 30 MG 24 hr tablet    potassium chloride (K-DUR) 10 mEq tablet    Pancrelipase, Lip-Prot-Amyl, (CREON) 67353 units CPEP    Pancrelipase, Lip-Prot-Amyl, (ZENPEP) 58192-883097 units CPEP    Allergies   Allergen Reactions    Codeine Headache     Reaction Date: [id number];            Objective     Blood pressure 156/100, pulse 69, temperature 97 6 °F (36 4 °C), temperature source Tympanic, height 5' 8" (1 727 m), weight 63 5 kg (140 lb)  Body mass index is 21 29 kg/m²        PHYSICAL EXAM:      General Appearance:   Alert, cooperative, no distress   HEENT:   Normocephalic, atraumatic, anicteric      Neck:  Supple, symmetrical, trachea midline   Lungs:   Clear to auscultation bilaterally; no rales, rhonchi or wheezing; respirations unlabored    Heart[de-identified]   Regular rate and rhythm; no murmur, rub, or gallop  Abdomen:   Soft, thin, non-tender, non-distended; normal bowel sounds; no masses, no organomegaly    Genitalia:   Deferred    Rectal:   Deferred    Extremities:  No cyanosis, clubbing or edema    Pulses:  2+ and symmetric    Skin:  No jaundice, rashes, or lesions, but has acne    Lymph nodes:  No palpable cervical lymphadenopathy        Lab Results:   No visits with results within 1 Day(s) from this visit  Latest known visit with results is:   Lab on 03/23/2018   Component Date Value    White Blood Cells, Stool 03/23/2018 No white blood cells seen   Pancreatic Elastase-1 03/23/2018 >500          Radiology Results:   No results found

## 2018-05-11 ENCOUNTER — TELEPHONE (OUTPATIENT)
Dept: OBGYN CLINIC | Facility: HOSPITAL | Age: 48
End: 2018-05-11

## 2018-05-11 NOTE — TELEPHONE ENCOUNTER
Caller: patient  Call back number: [phone]  Patient's doctor: Dr Paul Hinson    Patient called stating his MRI is scheduled for 5/20  He states the knee is very painful and "there is no stability"  He states the knee is swollen and "feels like something is moving in there"  He is asking what he should do for this  Should he wear a brace?  Please advise

## 2018-05-11 NOTE — TELEPHONE ENCOUNTER
If patient calls back please advise him he can get a short hinged knee brace in the office today and ice, elevate, and take nsaids until follow up

## 2018-05-15 ENCOUNTER — LAB (OUTPATIENT)
Dept: LAB | Facility: MEDICAL CENTER | Age: 48
End: 2018-05-15
Attending: INTERNAL MEDICINE
Payer: COMMERCIAL

## 2018-05-15 DIAGNOSIS — K86.89 PANCREATIC INSUFFICIENCY: ICD-10-CM

## 2018-05-15 LAB — LIPASE SERPL-CCNC: 141 U/L (ref 73–393)

## 2018-05-15 PROCEDURE — 36415 COLL VENOUS BLD VENIPUNCTURE: CPT

## 2018-05-15 PROCEDURE — 83690 ASSAY OF LIPASE: CPT

## 2018-05-15 PROCEDURE — 82787 IGG 1 2 3 OR 4 EACH: CPT

## 2018-05-16 ENCOUNTER — TELEPHONE (OUTPATIENT)
Dept: GASTROENTEROLOGY | Facility: CLINIC | Age: 48
End: 2018-05-16

## 2018-05-16 DIAGNOSIS — R11.0 NAUSEA: Primary | ICD-10-CM

## 2018-05-16 RX ORDER — ONDANSETRON 4 MG/1
4 TABLET, FILM COATED ORAL EVERY 8 HOURS PRN
Qty: 20 TABLET | Refills: 0 | Status: SHIPPED | OUTPATIENT
Start: 2018-05-16

## 2018-05-16 NOTE — TELEPHONE ENCOUNTER
RYAN PATIENT    He is asking for refill on zolfran  I didn't see it in his chart  He also wanted to let dr Tiffany Encinas know that he is taking the zenpep

## 2018-05-21 LAB — MISCELLANEOUS LAB TEST RESULT: NORMAL

## 2018-05-22 ENCOUNTER — HOSPITAL ENCOUNTER (OUTPATIENT)
Dept: MRI IMAGING | Facility: HOSPITAL | Age: 48
Discharge: HOME | End: 2018-05-22
Payer: COMMERCIAL

## 2018-05-22 DIAGNOSIS — S89.92XA INJURY OF LEFT KNEE, INITIAL ENCOUNTER: ICD-10-CM

## 2018-05-22 PROCEDURE — 73721 MRI JNT OF LWR EXTRE W/O DYE: CPT

## 2018-05-22 NOTE — PROGRESS NOTES
The results were negative  My medical assistant will call him to let him know the results  IgG sublass 4 level was normal, so autoimmune pancreatitis is less likely

## 2018-05-29 ENCOUNTER — OFFICE VISIT (OUTPATIENT)
Dept: OBGYN CLINIC | Facility: MEDICAL CENTER | Age: 48
End: 2018-05-29
Payer: COMMERCIAL

## 2018-05-29 VITALS
HEIGHT: 68 IN | HEART RATE: 83 BPM | WEIGHT: 140 LBS | BODY MASS INDEX: 21.22 KG/M2 | DIASTOLIC BLOOD PRESSURE: 102 MMHG | SYSTOLIC BLOOD PRESSURE: 160 MMHG

## 2018-05-29 DIAGNOSIS — S83.242A OTHER TEAR OF MEDIAL MENISCUS, CURRENT INJURY, LEFT KNEE, INITIAL ENCOUNTER: Primary | ICD-10-CM

## 2018-05-29 PROCEDURE — 99214 OFFICE O/P EST MOD 30 MIN: CPT | Performed by: ORTHOPAEDIC SURGERY

## 2018-05-29 NOTE — PROGRESS NOTES
Orthopaedic Surgery - Office Note  Faina Zambrano (49 y o  male)   : 1970   MRN: [id number]  Encounter Date: 2018    Chief Complaint   Patient presents with    Right Knee - Pain       Assessment / Plan  L knee meniscus tear    · The diagnosis and treatment options were reviewed  · The patient wishes to proceed with L knee arthroscopy with partial menisectomy  · The risks, benefits, and alternatives were discussed  · Written consent was obtained  Return for Recheck after sx  History of Present Illness  Faina Zambrano is a 49 y o  male who presents with L knee pain  Pt states that since we saw him last in December his knee pain  Pt states that he waited to get his MRI done until this past month due to his pain being manageable until the past month or so  Pt c/o movement in his knee and feels like his knee wants to lock on him  Pr pain radiates down into his ankle  Pt describes that going down steps causes him the most pain  Pt has started to wear a knee brace which he said gives him partial relief  Pt denies numbness and tingling  Pt is here today to review his MRI  Review of Systems  Pertinent items are noted in HPI  All other systems were reviewed and are negative  Physical Exam  BP (!) 160/102   Pulse 83   Ht 5' 8" (1 727 m)   Wt 63 5 kg (140 lb)   BMI 21 29 kg/m²   Cons: Appears well  No apparent distress  Psych: Alert  Oriented x3  Mood and affect normal   Eyes: PERRLA, EOMI  Resp: Normal effort  No audible wheezing or stridor  CV: Palpable pulse  No discernable arrhythmia  No LE edema  Lymph:  No palpable cervical, axillary, or inguinal lymphadenopathy  Skin: Warm  No palpable masses  No visible lesions  Neuro: Normal muscle tone  Normal and symmetric DTR's  Left Knee Exam  Alignment:  Normal knee alignment  Inspection:  mild anterior knee swelling  Palpation:  medial femoral epicondyle and medial joint line  tenderness  ROM:  Knee Extension 0   Knee Flexion 110  Strength:  5/5 quadriceps and hamstrings  Stability:  pain with valgus stress without instability  Tests:  (+) Landy  Patella:  Patella tracks centrally without crepitus  Neurovascular:  Sensation intact in DP/SP/Vazquez/Sa/T nerve distributions  2+ DP & PT pulses  Gait:  Antalgic  Studies Reviewed  MRI of left knee - shows a complex posterior horn medial meniscus tear    Procedures  No procedures today  Medical, Surgical, Family, and Social History  The patient's medical history, family history, and social history, were reviewed and updated as appropriate  Past Medical History:   Diagnosis Date    A-fib ([address] )     Abdominal pain     Abnormal weight loss     Anxiety     Chronic diarrhea     CKD (chronic kidney disease)     Concussion     Dysphagia     Epilepsy (HCC)     GERD (gastroesophageal reflux disease)     Hypertension     Hypokalemia     Irregular heart beat     Irritable bowel syndrome     Lupus anticoagulant disorder (HCC)     Memory loss     Pancreatic insufficiency     Renal disorder     stage 3 renal failure       Past Surgical History:   Procedure Laterality Date    COLONOSCOPY      FRACTURE SURGERY      HERNIA REPAIR      KNEE ARTHROSCOPY      WI EDG US EXAM SURGICAL ALTER STOM DUODENUM/JEJUNUM N/A 5/7/2018    Procedure: RADIAL ENDOSCOPIC U/S;  Surgeon: Sachi Maurice MD;  Location:  GI LAB; Service: Gastroenterology    WI ESOPHAGOGASTRODUODENOSCOPY TRANSORAL DIAGNOSTIC N/A 12/29/2017    Procedure: EGD WITH PUSH ENTROSCOPE AND COLONOSCOPY;  Surgeon: Sachi Maurice MD;  [facility]; Service: Gastroenterology    SHOULDER SURGERY         Family History   Problem Relation Age of Onset    No Known Problems Mother     No Known Problems Father        Social History     Occupational History    Not on file       Social History Main Topics    Smoking status: Current Every Day Smoker     Packs/day: 1 00     Types: Cigarettes    Smokeless tobacco: Never Used    Alcohol use Yes      Comment: socially    Drug use: No    Sexual activity: Yes       Allergies   Allergen Reactions    Codeine Headache     Reaction Date: 14Sep2011;          Current Outpatient Prescriptions:     aspirin 81 MG tablet, Take 81 mg by mouth daily  , Disp: , Rfl:     carvedilol (COREG) 12 5 mg tablet, Take 12 5 mg by mouth 2 (two) times a day  , Disp: , Rfl:     chlorthalidone 25 mg tablet, Take 25 mg by mouth daily, Disp: , Rfl:     dicyclomine (BENTYL) 20 mg tablet, Take 1 tablet (20 mg total) by mouth 3 (three) times a day as needed (pain), Disp: 60 tablet, Rfl: 3    NIFEdipine ER (ADALAT CC) 30 MG 24 hr tablet, Take 30 mg by mouth 2 (two) times a day, Disp: , Rfl:     ondansetron (ZOFRAN) 4 mg tablet, Take 1 tablet (4 mg total) by mouth every 8 (eight) hours as needed for nausea or vomiting, Disp: 20 tablet, Rfl: 0    Pancrelipase, Lip-Prot-Amyl, (CREON) 37939 units CPEP, Take 2 capsules (72,000 Units total) by mouth 3 (three) times a day with meals, Disp: 270 capsule, Rfl: 5    potassium chloride (K-DUR) 10 mEq tablet, Take by mouth 2 (two) times a day, Disp: , Rfl:     Pancrelipase, Lip-Prot-Amyl, (ZENPEP) 16360-941929 units CPEP, Take 2 capsules by mouth 3 (three) times a day with meals, Disp: 270 capsule, Rfl: 5      Savannah Jean    Scribe Attestation    I,:   Jeromy Fairchild am acting as a scribe while in the presence of the attending physician :        I,:   Sumeet Ferguson MD personally performed the services described in this documentation    as scribed in my presence :

## 2018-06-07 ENCOUNTER — HOSPITAL ENCOUNTER (OUTPATIENT)
Dept: NON INVASIVE DIAGNOSTICS | Facility: HOSPITAL | Age: 48
Discharge: HOME | End: 2018-06-07
Attending: ORTHOPAEDIC SURGERY
Payer: COMMERCIAL

## 2018-06-07 ENCOUNTER — APPOINTMENT (OUTPATIENT)
Dept: LAB | Facility: HOSPITAL | Age: 48
End: 2018-06-07
Attending: ORTHOPAEDIC SURGERY
Payer: COMMERCIAL

## 2018-06-07 ENCOUNTER — APPOINTMENT (OUTPATIENT)
Dept: PREADMISSION TESTING | Facility: HOSPITAL | Age: 48
End: 2018-06-07
Payer: COMMERCIAL

## 2018-06-07 ENCOUNTER — ANESTHESIA EVENT (OUTPATIENT)
Dept: PERIOP | Facility: HOSPITAL | Age: 48
End: 2018-06-07
Payer: COMMERCIAL

## 2018-06-07 DIAGNOSIS — Z01.818 PREOP TESTING: ICD-10-CM

## 2018-06-07 LAB
ANION GAP SERPL CALCULATED.3IONS-SCNC: 7 MMOL/L (ref 4–13)
APTT PPP: 32 SECONDS (ref 24–36)
BASOPHILS # BLD AUTO: 0.03 THOUSANDS/ΜL (ref 0–0.1)
BASOPHILS NFR BLD AUTO: 1 % (ref 0–1)
BUN SERPL-MCNC: 18 MG/DL (ref 5–25)
CALCIUM SERPL-MCNC: 8.9 MG/DL (ref 8.3–10.1)
CHLORIDE SERPL-SCNC: 103 MMOL/L (ref 100–108)
CO2 SERPL-SCNC: 30 MMOL/L (ref 21–32)
CREAT SERPL-MCNC: 1.12 MG/DL (ref 0.6–1.3)
EOSINOPHIL # BLD AUTO: 0.12 THOUSAND/ΜL (ref 0–0.61)
EOSINOPHIL NFR BLD AUTO: 3 % (ref 0–6)
ERYTHROCYTE [DISTWIDTH] IN BLOOD BY AUTOMATED COUNT: 12.7 % (ref 11.6–15.1)
GFR SERPL CREATININE-BSD FRML MDRD: 77 ML/MIN/1.73SQ M
GLUCOSE SERPL-MCNC: 86 MG/DL (ref 65–140)
HCT VFR BLD AUTO: 44.3 % (ref 36.5–49.3)
HGB BLD-MCNC: 15.5 G/DL (ref 12–17)
INR PPP: 1.01 (ref 0.86–1.17)
LYMPHOCYTES # BLD AUTO: 1.18 THOUSANDS/ΜL (ref 0.6–4.47)
LYMPHOCYTES NFR BLD AUTO: 31 % (ref 14–44)
MCH RBC QN AUTO: 33 PG (ref 26.8–34.3)
MCHC RBC AUTO-ENTMCNC: 35 G/DL (ref 31.4–37.4)
MCV RBC AUTO: 95 FL (ref 82–98)
MONOCYTES # BLD AUTO: 0.34 THOUSAND/ΜL (ref 0.17–1.22)
MONOCYTES NFR BLD AUTO: 9 % (ref 4–12)
NEUTROPHILS # BLD AUTO: 2.17 THOUSANDS/ΜL (ref 1.85–7.62)
NEUTS SEG NFR BLD AUTO: 57 % (ref 43–75)
NRBC BLD AUTO-RTO: 0 /100 WBCS
PLATELET # BLD AUTO: 142 THOUSANDS/UL (ref 149–390)
PMV BLD AUTO: 10.7 FL (ref 8.9–12.7)
POTASSIUM SERPL-SCNC: 3.8 MMOL/L (ref 3.5–5.3)
PROTHROMBIN TIME: 13.4 SECONDS (ref 11.8–14.2)
RBC # BLD AUTO: 4.69 MILLION/UL (ref 3.88–5.62)
SODIUM SERPL-SCNC: 140 MMOL/L (ref 136–145)
WBC # BLD AUTO: 3.84 THOUSAND/UL (ref 4.31–10.16)

## 2018-06-07 PROCEDURE — 93005 ELECTROCARDIOGRAM TRACING: CPT

## 2018-06-07 PROCEDURE — 85025 COMPLETE CBC W/AUTO DIFF WBC: CPT

## 2018-06-07 PROCEDURE — 80048 BASIC METABOLIC PNL TOTAL CA: CPT

## 2018-06-07 PROCEDURE — 85730 THROMBOPLASTIN TIME PARTIAL: CPT

## 2018-06-07 PROCEDURE — 36415 COLL VENOUS BLD VENIPUNCTURE: CPT

## 2018-06-07 PROCEDURE — 85610 PROTHROMBIN TIME: CPT

## 2018-06-07 PROCEDURE — 93010 ELECTROCARDIOGRAM REPORT: CPT | Performed by: INTERNAL MEDICINE

## 2018-06-07 NOTE — ANESTHESIA PREPROCEDURE EVALUATION
Review of Systems/Medical History      History of anesthetic complications (pt requests zofran) PONV    Cardiovascular  Hypertension controlled,    Pulmonary  Smoker cigarette smoker  , Tobacco cessation counseling given Cumulative Pack Years: 27,        GI/Hepatic    GERD well controlled,  Hiatal hernia,   Comment: Chronic pancreatitis     Chronic kidney disease (polycystic kidney disease) stage 3,        Endo/Other  Negative endo/other ROS      GYN       Hematology  Negative hematology ROS      Musculoskeletal    Arthritis     Neurology    Headaches,    Psychology   Anxiety,              Physical Exam    Airway    Mallampati score: II  TM Distance: >3 FB  Neck ROM: full     Dental   Comment: veneers,     Cardiovascular  Rhythm: regular, Rate: normal,     Pulmonary  Breath sounds clear to auscultation,     Other Findings        Anesthesia Plan  ASA Score- 3     Anesthesia Type- general with ASA Monitors  Additional Monitors:   Airway Plan: LMA  Comment: intraarticular  Plan Factors-    Induction- intravenous  Postoperative Plan-     Informed Consent- Anesthetic plan and risks discussed with patient

## 2018-06-07 NOTE — PRE-PROCEDURE INSTRUCTIONS
I'd like to first repeat the liver tests and see if they are still elevated.  She should not drink any alcohol (I don't think she drinks any anyway) for at least 72 hours before doing the test.    Fatty liver was seen on her CAT scan in 2011 and that can definitely cause elevated liver tests   Pre-Surgery Instructions:   Medication Instructions    aspirin 81 MG tablet Patient was instructed by Physician and understands   carvedilol (COREG) 12 5 mg tablet Patient was instructed by Physician and understands   chlorthalidone 25 mg tablet Patient was instructed by Physician and understands   dicyclomine (BENTYL) 20 mg tablet Patient was instructed by Physician and understands   NIFEdipine ER (ADALAT CC) 30 MG 24 hr tablet Patient was instructed by Physician and understands   ondansetron (ZOFRAN) 4 mg tablet Patient was instructed by Physician and understands   Pancrelipase, Lip-Prot-Amyl, (ZENPEP) 58296-471788 units CPEP Patient was instructed by Physician and understands   potassium chloride (K-DUR) 10 mEq tablet Patient was instructed by Physician and understands  Pt instructed by Dr Bladimir Butler *to take*nifedipine and carvedilol *with a sip of water the morning of surgery  Pt given/reviewed [facility] preop instructions and chlorhexadine soap   Pt to hold asa/NSAIDS/vitamins/herbal supplements one week before surgery or as per Dr Paul Hinson

## 2018-06-08 LAB
ATRIAL RATE: 67 BPM
P AXIS: 73 DEGREES
PR INTERVAL: 158 MS
QRS AXIS: 66 DEGREES
QRSD INTERVAL: 96 MS
QT INTERVAL: 412 MS
QTC INTERVAL: 435 MS
T WAVE AXIS: 70 DEGREES
VENTRICULAR RATE: 67 BPM

## 2018-06-11 ENCOUNTER — TELEPHONE (OUTPATIENT)
Dept: OBGYN CLINIC | Facility: MEDICAL CENTER | Age: 48
End: 2018-06-11

## 2018-06-11 NOTE — TELEPHONE ENCOUNTER
Patient called stating his WBC and Platelets came back abnormal is he ok for surgery on Thursday 6/14/18? Left Knee Partial Medial Meniscectomy

## 2018-06-13 ENCOUNTER — OFFICE VISIT (OUTPATIENT)
Dept: GASTROENTEROLOGY | Facility: MEDICAL CENTER | Age: 48
End: 2018-06-13
Payer: COMMERCIAL

## 2018-06-13 ENCOUNTER — TELEPHONE (OUTPATIENT)
Dept: GASTROENTEROLOGY | Facility: AMBULARY SURGERY CENTER | Age: 48
End: 2018-06-13

## 2018-06-13 ENCOUNTER — TELEPHONE (OUTPATIENT)
Dept: HEMATOLOGY ONCOLOGY | Facility: CLINIC | Age: 48
End: 2018-06-13

## 2018-06-13 VITALS
HEIGHT: 68 IN | SYSTOLIC BLOOD PRESSURE: 130 MMHG | HEART RATE: 77 BPM | BODY MASS INDEX: 21.07 KG/M2 | TEMPERATURE: 97.7 F | DIASTOLIC BLOOD PRESSURE: 90 MMHG | WEIGHT: 139 LBS

## 2018-06-13 DIAGNOSIS — K86.1 CHRONIC PANCREATITIS, UNSPECIFIED PANCREATITIS TYPE (HCC): ICD-10-CM

## 2018-06-13 DIAGNOSIS — K52.9 CHRONIC DIARRHEA: ICD-10-CM

## 2018-06-13 DIAGNOSIS — R63.4 ABNORMAL WEIGHT LOSS: ICD-10-CM

## 2018-06-13 DIAGNOSIS — K86.89 PANCREATIC INSUFFICIENCY: ICD-10-CM

## 2018-06-13 DIAGNOSIS — R10.13 EPIGASTRIC PAIN: ICD-10-CM

## 2018-06-13 DIAGNOSIS — K21.9 GASTROESOPHAGEAL REFLUX DISEASE WITHOUT ESOPHAGITIS: ICD-10-CM

## 2018-06-13 DIAGNOSIS — K58.0 IRRITABLE BOWEL SYNDROME WITH DIARRHEA: Primary | ICD-10-CM

## 2018-06-13 PROCEDURE — 99214 OFFICE O/P EST MOD 30 MIN: CPT | Performed by: INTERNAL MEDICINE

## 2018-06-13 NOTE — LETTER
June 13, 2018     Brittany Calvillo MD  [id number] [place] [address]    Patient: Carl Knott   YOB: 1970   Date of Visit: 6/13/2018       Dear Dr Chucho Lutz: Thank you for referring Carl Knott to me for evaluation  Below are my notes for this consultation  If you have questions, please do not hesitate to call me  I look forward to following your patient along with you  Sincerely,        Gerardo Garcia MD        CC: No Recipients  Gerardo Garcia MD  6/13/2018  9:22 AM  Sign at close encounter  [facility] - Outpatient Follow-up Note  Carl Knott 49 y o  male MRN: [id number]  Encounter: [id number]          ASSESSMENT AND PLAN:      1  Irritable bowel syndrome with diarrhea  2  Chronic diarrhea  3  Pancreatic insufficiency  4  Abnormal weight loss  5  Epigastric pain  6  Chronic pancreatitis  His symptoms are due to chronic pancreatitis and he seems to be responding well to the pancreatic enzyme supplementation  Because he continues to have severe pain I will schedule him for an endoscopic ultrasound with celiac plexus block  I told him there is only about a 50% chance this will help his symptoms  I also suggested he alternates Tylenol and ibuprofen for symptomatic treatment  If his symptoms persist he could see a pain specialist     7  Gastroesophageal reflux disease without esophagitis  Fortunately his reflux symptoms are well controlled  I have asked him to continue diet lifestyle modification and suggested we could try medical therapy if his symptoms worsen  ______________________________________________________________________    SUBJECTIVE:  He presents for follow-up of his diarrhea, abdominal pain, and weight loss    His endoscopic ultrasound was suggestive of chronic pancreatitis probably due to combination of alcohol, cigarette smoking, and anabolic steroids in the past   He continues to complain of severe epigastric pain but feels his diarrhea has improved significantly with pancreatic enzyme supplementation  He is currently taking Zenpep  His nausea and reflux symptoms have been well controlled recently and he denies any bleeding  REVIEW OF SYSTEMS IS OTHERWISE NEGATIVE  Historical Information   Past Medical History:   Diagnosis Date    A-fib ([facility] )     Abdominal pain     Abnormal weight loss     Anxiety     Arthritis     Chronic diarrhea     CKD (chronic kidney disease)     Concussion     "multiple"    DDD (degenerative disc disease), cervical     DDD (degenerative disc disease), lumbar     Diverticulosis     Dysphagia     "not frequently"    Epilepsy ([facility] )     "temporal lobe, not on meds"    Exercise involving cycling     3-4x/week    GERD (gastroesophageal reflux disease)     not on meds    Hiatal hernia     Hypertension     Hypokalemia     Irregular heart beat     Irritable bowel syndrome     Left knee pain     Lupus anticoagulant disorder (HCC)     Memory loss     Nausea     Palpitations     Pancreatic insufficiency     Polycystic kidney disease     PONV (postoperative nausea and vomiting)     Renal disorder     stage 3 renal failure    Teeth missing     Vomiting     "when pancreatitis acts up"     Past Surgical History:   Procedure Laterality Date    COLONOSCOPY      FRACTURE SURGERY      HERNIA REPAIR      KNEE ARTHROSCOPY      MN EDG US EXAM SURGICAL ALTER STOM DUODENUM/JEJUNUM N/A 5/7/2018    Procedure: RADIAL ENDOSCOPIC U/S;  Surgeon: Aisha Carr MD;  Location:  GI LAB; Service: Gastroenterology    MN ESOPHAGOGASTRODUODENOSCOPY TRANSORAL DIAGNOSTIC N/A 12/29/2017    Procedure: EGD WITH PUSH ENTROSCOPE AND COLONOSCOPY;  Surgeon: Aisha Carr MD;  Location: [facility] GI LAB;   Service: Gastroenterology    SHOULDER SURGERY       Social History   History   Alcohol Use    Yes     Comment: socially,  amount varies of beer     History   Drug Use No     History   Smoking Status    Current Every Day Smoker    Packs/day: 1 00    Years: 20 00    Types: Cigarettes   Smokeless Tobacco    Never Used     Family History   Problem Relation Age of Onset    No Known Problems Mother     No Known Problems Father        Meds/Allergies       Current Outpatient Prescriptions:     aspirin 81 MG tablet    carvedilol (COREG) 12 5 mg tablet    chlorthalidone 25 mg tablet    dicyclomine (BENTYL) 20 mg tablet    NIFEdipine ER (ADALAT CC) 30 MG 24 hr tablet    ondansetron (ZOFRAN) 4 mg tablet    Pancrelipase, Lip-Prot-Amyl, (ZENPEP) 05636-176255 units CPEP    potassium chloride (K-DUR) 10 mEq tablet    Allergies   Allergen Reactions    Codeine Headache     Reaction Date: 14Sep2011;            Objective     Blood pressure 130/90, pulse 77, temperature 97 7 °F (36 5 °C), temperature source Tympanic, height 5' 8" (1 727 m), weight 63 kg (139 lb)  Body mass index is 21 13 kg/m²  PHYSICAL EXAM:      General Appearance:   Alert, cooperative, no distress   HEENT:   Normocephalic, atraumatic, anicteric      Neck:  Supple, symmetrical, trachea midline   Lungs:   Clear to auscultation bilaterally; no rales, rhonchi or wheezing; respirations unlabored    Heart[de-identified]   Regular rate and rhythm; no murmur, rub, or gallop  Abdomen:   Soft, thin with epigastric tenderness, non-distended; normal bowel sounds; no masses, no organomegaly    Genitalia:   Deferred    Rectal:   Deferred    Extremities:  No cyanosis, clubbing or edema    Pulses:  2+ and symmetric    Skin:  No jaundice, rashes, or lesions    Lymph nodes:  No palpable cervical lymphadenopathy        Lab Results:   No visits with results within 1 Day(s) from this visit     Latest known visit with results is:   Appointment on 06/07/2018   Component Date Value    Sodium 06/07/2018 140     Potassium 06/07/2018 3 8     Chloride 06/07/2018 103     CO2 06/07/2018 30     Anion Gap 06/07/2018 7     BUN 06/07/2018 18     Creatinine 06/07/2018 1 12  Glucose 06/07/2018 86     Calcium 06/07/2018 8 9     eGFR 06/07/2018 77     WBC 06/07/2018 3 84*    RBC 06/07/2018 4 69     Hemoglobin 06/07/2018 15 5     Hematocrit 06/07/2018 44 3     MCV 06/07/2018 95     MCH 06/07/2018 33 0     MCHC 06/07/2018 35 0     RDW 06/07/2018 12 7     MPV 06/07/2018 10 7     Platelets 94/70/5319 142*    nRBC 06/07/2018 0     Neutrophils Relative 06/07/2018 57     Lymphocytes Relative 06/07/2018 31     Monocytes Relative 06/07/2018 9     Eosinophils Relative 06/07/2018 3     Basophils Relative 06/07/2018 1     Neutrophils Absolute 06/07/2018 2 17     Lymphocytes Absolute 06/07/2018 1 18     Monocytes Absolute 06/07/2018 0 34     Eosinophils Absolute 06/07/2018 0 12     Basophils Absolute 06/07/2018 0 03     Protime 06/07/2018 13 4     INR 06/07/2018 1 01     PTT 06/07/2018 32          Radiology Results:   Mri Knee Left  Wo Contrast    Result Date: 5/23/2018  Narrative: MRI LEFT KNEE INDICATION:   S89  92XA: Unspecified injury of left lower leg, initial encounter  COMPARISON: Plain film dated 12/13/2017 TECHNIQUE:    MR sequences were obtained of the left knee including:  Localizer, axial T2 fat sat, coronal T1/T2 fat sat, sagittal PD/T2 fat sat  Images were acquired on a 1 5 Brenda unit  Gadolinium was not used  FINDINGS: SUBCUTANEOUS TISSUES: Normal JOINT EFFUSION: Moderate complex joint effusion identified  BAKER'S CYST: None  MENISCI: Complex undersurface posterior horn medial meniscus tear identified without the fragment or meniscal extrusion  The lateral meniscus remains intact  CRUCIATE LIGAMENTS: Intact  EXTENSOR APPARATUS: Intact  COLLATERAL LIGAMENTS: Grade 1 MCL sprain  LCL appears intact  ARTICULAR SURFACES: Normal  BONES: Multiple bone contusions in posterior lateral tibial plateau, weightbearing aspect medial femoral condyle as well as,  Dorsal marrow edema posterior lateral femoral condyle also suspicious for bone contusion    No evidence of fracture  MUSCULATURE:  Intact  Impression: 1  Complex undersurface posterior horn medial meniscus tear without flipped fragment  Associated grade 1 MCL sprain  2   Multiple bone contusions as above without fracture  3   Complex joint effusion most suspicious for hemarthrosis   Workstation performed: [id number]     Answers for HPI/ROS submitted by the patient on 6/13/2018   Abdominal pain  Chronicity: chronic  Onset: more than 1 year ago  Onset quality: gradual  Frequency: daily  Episode duration: 3 days  Progression since onset: waxing and waning  Pain location: RUQ  Pain - numeric: 8/10  Pain quality: burning, a sensation of fullness, sharp  Radiates to: RUQ, epigastric region  anorexia: Yes  arthralgias: No  belching: Yes  constipation: No  diarrhea: Yes  dysuria: No  fever: No  flatus: No  frequency: No  headaches: No  hematochezia: No  hematuria: No  melena: No  myalgias: No  nausea: Yes  weight loss: Yes  vomiting: Yes  Aggravated by: certain positions, eating, movement, palpation, vomiting  Relieved by: nothing

## 2018-06-13 NOTE — TELEPHONE ENCOUNTER
Patient called on the suggestion of his GI doc, had some lab work done recently  WBC and PLT were low   Saw Dr Sandrita Miranda in the past  Asking could Dr Sandrita Miranda look at the results and let him know does he need an appointment to see him for this

## 2018-06-13 NOTE — PROGRESS NOTES
[facility] - Outpatient Follow-up Note  Faina Zambrano 49 y o  male MRN: [id number]  Encounter: [id number]          ASSESSMENT AND PLAN:      1  Irritable bowel syndrome with diarrhea  2  Chronic diarrhea  3  Pancreatic insufficiency  4  Abnormal weight loss  5  Epigastric pain  6  Chronic pancreatitis  His symptoms are due to chronic pancreatitis and he seems to be responding well to the pancreatic enzyme supplementation  Because he continues to have severe pain I will schedule him for an endoscopic ultrasound with celiac plexus block  I told him there is only about a 50% chance this will help his symptoms  I also suggested he alternates Tylenol and ibuprofen for symptomatic treatment  If his symptoms persist he could see a pain specialist     7  Gastroesophageal reflux disease without esophagitis  Fortunately his reflux symptoms are well controlled  I have asked him to continue diet lifestyle modification and suggested we could try medical therapy if his symptoms worsen  ______________________________________________________________________    SUBJECTIVE:  He presents for follow-up of his diarrhea, abdominal pain, and weight loss  His endoscopic ultrasound was suggestive of chronic pancreatitis probably due to combination of alcohol, cigarette smoking, and anabolic steroids in the past   He continues to complain of severe epigastric pain but feels his diarrhea has improved significantly with pancreatic enzyme supplementation  He is currently taking Zenpep  His nausea and reflux symptoms have been well controlled recently and he denies any bleeding  REVIEW OF SYSTEMS IS OTHERWISE NEGATIVE        Historical Information   Past Medical History:   Diagnosis Date    [facility]fib ([facility] )     Abdominal pain     Abnormal weight loss     Anxiety     Arthritis     Chronic diarrhea     CKD (chronic kidney disease)     Concussion     "multiple"    DDD (degenerative disc disease), cervical     DDD (degenerative disc disease), lumbar     Diverticulosis     Dysphagia     "not frequently"    Epilepsy ([address] )     "temporal lobe, not on meds"    Exercise involving cycling     3-4x/week    GERD (gastroesophageal reflux disease)     not on meds    Hiatal hernia     Hypertension     Hypokalemia     Irregular heart beat     Irritable bowel syndrome     Left knee pain     Lupus anticoagulant disorder (HCC)     Memory loss     Nausea     Palpitations     Pancreatic insufficiency     Polycystic kidney disease     PONV (postoperative nausea and vomiting)     Renal disorder     stage 3 renal failure    Teeth missing     Vomiting     "when pancreatitis acts up"     Past Surgical History:   Procedure Laterality Date    COLONOSCOPY      FRACTURE SURGERY      HERNIA REPAIR      KNEE ARTHROSCOPY      [place] EDG US EXAM SURGICAL ALTER STOM DUODENUM/JEJUNUM N/A 5/7/2018    Procedure: RADIAL ENDOSCOPIC U/S;  Surgeon: Brian Cardenas MD;  Location:  GI LAB; Service: Gastroenterology    TX ESOPHAGOGASTRODUODENOSCOPY TRANSORAL DIAGNOSTIC N/A 12/29/2017    Procedure: EGD WITH PUSH ENTROSCOPE AND COLONOSCOPY;  Surgeon: Brian Cardenas MD;  Location: [facility];   Service: Gastroenterology    SHOULDER SURGERY       Social History   History   Alcohol Use    Yes     Comment: socially,  amount varies of beer     History   Drug Use No     History   Smoking Status    Current Every Day Smoker    Packs/day: 1 00    Years: 20 00    Types: Cigarettes   Smokeless Tobacco    Never Used     Family History   Problem Relation Age of Onset    No Known Problems Mother     No Known Problems Father        Meds/Allergies       Current Outpatient Prescriptions:     aspirin 81 MG tablet    carvedilol (COREG) 12 5 mg tablet    chlorthalidone 25 mg tablet    dicyclomine (BENTYL) 20 mg tablet    NIFEdipine ER (ADALAT CC) 30 MG 24 hr tablet    ondansetron (ZOFRAN) 4 mg tablet    Pancrelipase, Lip-Prot-Amyl, (ZENPEP) 04334-005355 units CPEP    potassium chloride (K-DUR) 10 mEq tablet    Allergies   Allergen Reactions    Codeine Headache     Reaction Date: 14Sep2011;            Objective     Blood pressure 130/90, pulse 77, temperature 97 7 °F (36 5 °C), temperature source Tympanic, height 5' 8" (1 727 m), weight 63 kg (139 lb)  Body mass index is 21 13 kg/m²  PHYSICAL EXAM:      General Appearance:   Alert, cooperative, no distress   HEENT:   Normocephalic, atraumatic, anicteric      Neck:  Supple, symmetrical, trachea midline   Lungs:   Clear to auscultation bilaterally; no rales, rhonchi or wheezing; respirations unlabored    Heart[de-identified]   Regular rate and rhythm; no murmur, rub, or gallop  Abdomen:   Soft, thin with epigastric tenderness, non-distended; normal bowel sounds; no masses, no organomegaly    Genitalia:   Deferred    Rectal:   Deferred    Extremities:  No cyanosis, clubbing or edema    Pulses:  2+ and symmetric    Skin:  No jaundice, rashes, or lesions    Lymph nodes:  No palpable cervical lymphadenopathy        Lab Results:   No visits with results within 1 Day(s) from this visit     Latest known visit with results is:   Appointment on 06/07/2018   Component Date Value    Sodium 06/07/2018 140     Potassium 06/07/2018 3 8     Chloride 06/07/2018 103     CO2 06/07/2018 30     Anion Gap 06/07/2018 7     BUN 06/07/2018 18     Creatinine 06/07/2018 1 12     Glucose 06/07/2018 86     Calcium 06/07/2018 8 9     eGFR 06/07/2018 77     WBC 06/07/2018 3 84*    RBC 06/07/2018 4 69     Hemoglobin 06/07/2018 15 5     Hematocrit 06/07/2018 44 3     MCV 06/07/2018 95     MCH 06/07/2018 33 0     MCHC 06/07/2018 35 0     RDW 06/07/2018 12 7     MPV 06/07/2018 10 7     Platelets 96/10/1436 142*    nRBC 06/07/2018 0     Neutrophils Relative 06/07/2018 57     Lymphocytes Relative 06/07/2018 31     Monocytes Relative 06/07/2018 9     Eosinophils Relative 06/07/2018 3     Basophils Relative 06/07/2018 1     Neutrophils Absolute 06/07/2018 2 17     Lymphocytes Absolute 06/07/2018 1 18     Monocytes Absolute 06/07/2018 0 34     Eosinophils Absolute 06/07/2018 0 12     Basophils Absolute 06/07/2018 0 03     Protime 06/07/2018 13 4     INR 06/07/2018 1 01     PTT 06/07/2018 32          Radiology Results:   Mri Knee Left  Wo Contrast    Result Date: 5/23/2018  Narrative: MRI LEFT KNEE INDICATION:   S89  92XA: Unspecified injury of left lower leg, initial encounter  COMPARISON: Plain film dated 12/13/2017 TECHNIQUE:    MR sequences were obtained of the left knee including:  Localizer, axial T2 fat sat, coronal T1/T2 fat sat, sagittal PD/T2 fat sat  Images were acquired on a 1 5 Brenda unit  Gadolinium was not used  FINDINGS: SUBCUTANEOUS TISSUES: Normal JOINT EFFUSION: Moderate complex joint effusion identified  BAKER'S CYST: None  MENISCI: Complex undersurface posterior horn medial meniscus tear identified without the fragment or meniscal extrusion  The lateral meniscus remains intact  CRUCIATE LIGAMENTS: Intact  EXTENSOR APPARATUS: Intact  COLLATERAL LIGAMENTS: Grade 1 MCL sprain  LCL appears intact  ARTICULAR SURFACES: Normal  BONES: Multiple bone contusions in posterior lateral tibial plateau, weightbearing aspect medial femoral condyle as well as,  Dorsal marrow edema posterior lateral femoral condyle also suspicious for bone contusion  No evidence of fracture  MUSCULATURE:  Intact  Impression: 1  Complex undersurface posterior horn medial meniscus tear without flipped fragment  Associated grade 1 MCL sprain  2   Multiple bone contusions as above without fracture  3   Complex joint effusion most suspicious for hemarthrosis   Workstation performed: [id number]     Answers for HPI/ROS submitted by the patient on 6/13/2018   Abdominal pain  Chronicity: chronic  Onset: more than 1 year ago  Onset quality: gradual  Frequency: daily  Episode duration: 3 days  Progression since onset: waxing and waning  Pain location: RUQ  Pain - numeric: 8/10  Pain quality: burning, a sensation of fullness, sharp  Radiates to: RUQ, epigastric region  anorexia: Yes  arthralgias: No  belching: Yes  constipation: No  diarrhea: Yes  dysuria: No  fever: No  flatus: No  frequency: No  headaches: No  hematochezia: No  hematuria: No  melena: No  myalgias: No  nausea: Yes  weight loss: Yes  vomiting: Yes  Aggravated by: certain positions, eating, movement, palpation, vomiting  Relieved by: nothing

## 2018-06-13 NOTE — TELEPHONE ENCOUNTER
DR DAVIS'S PT    Pt called requesting for the diagnostic of his chronic pancreatitis to be noted on a summary sheet then mailed to him  This is all for his disability

## 2018-06-14 ENCOUNTER — HOSPITAL ENCOUNTER (OUTPATIENT)
Facility: HOSPITAL | Age: 48
Setting detail: OUTPATIENT SURGERY
Discharge: HOME | End: 2018-06-14
Attending: ORTHOPAEDIC SURGERY | Admitting: ORTHOPAEDIC SURGERY
Payer: COMMERCIAL

## 2018-06-14 ENCOUNTER — ANESTHESIA (OUTPATIENT)
Dept: PERIOP | Facility: HOSPITAL | Age: 48
End: 2018-06-14
Payer: COMMERCIAL

## 2018-06-14 VITALS
DIASTOLIC BLOOD PRESSURE: 89 MMHG | WEIGHT: 139 LBS | HEIGHT: 68 IN | SYSTOLIC BLOOD PRESSURE: 162 MMHG | OXYGEN SATURATION: 97 % | TEMPERATURE: 98.1 F | HEART RATE: 80 BPM | RESPIRATION RATE: 20 BRPM | BODY MASS INDEX: 21.07 KG/M2

## 2018-06-14 DIAGNOSIS — S83.242A OTHER TEAR OF MEDIAL MENISCUS, CURRENT INJURY, LEFT KNEE, INITIAL ENCOUNTER: Primary | ICD-10-CM

## 2018-06-14 PROCEDURE — 29881 ARTHRS KNE SRG MNISECTMY M/L: CPT | Performed by: ORTHOPAEDIC SURGERY

## 2018-06-14 PROCEDURE — 29881 ARTHRS KNE SRG MNISECTMY M/L: CPT | Performed by: PHYSICIAN ASSISTANT

## 2018-06-14 RX ORDER — OXYCODONE HYDROCHLORIDE AND ACETAMINOPHEN 5; 325 MG/1; MG/1
1 TABLET ORAL EVERY 4 HOURS PRN
Status: DISCONTINUED | OUTPATIENT
Start: 2018-06-14 | End: 2018-06-14 | Stop reason: HOSPADM

## 2018-06-14 RX ORDER — LIDOCAINE HYDROCHLORIDE AND EPINEPHRINE 20; 5 MG/ML; UG/ML
INJECTION, SOLUTION EPIDURAL; INFILTRATION; INTRACAUDAL; PERINEURAL AS NEEDED
Status: DISCONTINUED | OUTPATIENT
Start: 2018-06-14 | End: 2018-06-14 | Stop reason: SURG

## 2018-06-14 RX ORDER — MIDAZOLAM HYDROCHLORIDE 1 MG/ML
INJECTION INTRAMUSCULAR; INTRAVENOUS AS NEEDED
Status: DISCONTINUED | OUTPATIENT
Start: 2018-06-14 | End: 2018-06-14 | Stop reason: SURG

## 2018-06-14 RX ORDER — PROMETHAZINE HYDROCHLORIDE 12.5 MG/1
12.5 TABLET ORAL EVERY 6 HOURS PRN
Qty: 30 TABLET | Refills: 0 | Status: SHIPPED | OUTPATIENT
Start: 2018-06-14

## 2018-06-14 RX ORDER — SODIUM CHLORIDE 9 MG/ML
125 INJECTION, SOLUTION INTRAVENOUS CONTINUOUS
Status: DISCONTINUED | OUTPATIENT
Start: 2018-06-14 | End: 2018-06-14 | Stop reason: HOSPADM

## 2018-06-14 RX ORDER — ROPIVACAINE HYDROCHLORIDE 5 MG/ML
INJECTION, SOLUTION EPIDURAL; INFILTRATION; PERINEURAL AS NEEDED
Status: DISCONTINUED | OUTPATIENT
Start: 2018-06-14 | End: 2018-06-14 | Stop reason: SURG

## 2018-06-14 RX ORDER — OXYCODONE HYDROCHLORIDE AND ACETAMINOPHEN 5; 325 MG/1; MG/1
2 TABLET ORAL EVERY 4 HOURS PRN
Status: DISCONTINUED | OUTPATIENT
Start: 2018-06-14 | End: 2018-06-14 | Stop reason: HOSPADM

## 2018-06-14 RX ORDER — NAPROXEN 500 MG/1
500 TABLET ORAL 2 TIMES DAILY WITH MEALS
Qty: 60 TABLET | Refills: 0 | Status: SHIPPED | OUTPATIENT
Start: 2018-06-14

## 2018-06-14 RX ORDER — OXYCODONE HYDROCHLORIDE 5 MG/1
5 TABLET ORAL EVERY 4 HOURS PRN
Qty: 30 TABLET | Refills: 0 | Status: SHIPPED | OUTPATIENT
Start: 2018-06-14 | End: 2018-06-24

## 2018-06-14 RX ORDER — MEPERIDINE HYDROCHLORIDE 50 MG/ML
12.5 INJECTION INTRAMUSCULAR; INTRAVENOUS; SUBCUTANEOUS AS NEEDED
Status: DISCONTINUED | OUTPATIENT
Start: 2018-06-14 | End: 2018-06-14 | Stop reason: HOSPADM

## 2018-06-14 RX ORDER — FENTANYL CITRATE 50 UG/ML
INJECTION, SOLUTION INTRAMUSCULAR; INTRAVENOUS AS NEEDED
Status: DISCONTINUED | OUTPATIENT
Start: 2018-06-14 | End: 2018-06-14 | Stop reason: SURG

## 2018-06-14 RX ORDER — ONDANSETRON 2 MG/ML
INJECTION INTRAMUSCULAR; INTRAVENOUS AS NEEDED
Status: DISCONTINUED | OUTPATIENT
Start: 2018-06-14 | End: 2018-06-14 | Stop reason: SURG

## 2018-06-14 RX ORDER — FENTANYL CITRATE/PF 50 MCG/ML
25 SYRINGE (ML) INJECTION
Status: DISCONTINUED | OUTPATIENT
Start: 2018-06-14 | End: 2018-06-14 | Stop reason: HOSPADM

## 2018-06-14 RX ORDER — MORPHINE SULFATE 2 MG/ML
2 INJECTION, SOLUTION INTRAMUSCULAR; INTRAVENOUS EVERY 4 HOURS PRN
Status: DISCONTINUED | OUTPATIENT
Start: 2018-06-14 | End: 2018-06-14 | Stop reason: HOSPADM

## 2018-06-14 RX ORDER — PROPOFOL 10 MG/ML
INJECTION, EMULSION INTRAVENOUS AS NEEDED
Status: DISCONTINUED | OUTPATIENT
Start: 2018-06-14 | End: 2018-06-14 | Stop reason: SURG

## 2018-06-14 RX ADMIN — LIDOCAINE HYDROCHLORIDE AND EPINEPHRINE 20 ML: 20; 5 INJECTION, SOLUTION EPIDURAL; INFILTRATION; INTRACAUDAL; PERINEURAL at 13:18

## 2018-06-14 RX ADMIN — CEFAZOLIN SODIUM 1000 MG: 1 SOLUTION INTRAVENOUS at 12:35

## 2018-06-14 RX ADMIN — FENTANYL CITRATE 50 MCG: 50 INJECTION, SOLUTION INTRAMUSCULAR; INTRAVENOUS at 13:20

## 2018-06-14 RX ADMIN — PROPOFOL 200 MG: 10 INJECTION, EMULSION INTRAVENOUS at 12:29

## 2018-06-14 RX ADMIN — FENTANYL CITRATE 100 MCG: 50 INJECTION, SOLUTION INTRAMUSCULAR; INTRAVENOUS at 13:17

## 2018-06-14 RX ADMIN — ONDANSETRON HYDROCHLORIDE 4 MG: 2 INJECTION, SOLUTION INTRAVENOUS at 12:44

## 2018-06-14 RX ADMIN — SODIUM CHLORIDE 125 ML/HR: 0.9 INJECTION, SOLUTION INTRAVENOUS at 11:01

## 2018-06-14 RX ADMIN — FENTANYL CITRATE 50 MCG: 50 INJECTION, SOLUTION INTRAMUSCULAR; INTRAVENOUS at 13:16

## 2018-06-14 RX ADMIN — MIDAZOLAM 4 MG: 1 INJECTION INTRAMUSCULAR; INTRAVENOUS at 13:17

## 2018-06-14 RX ADMIN — ROPIVACAINE HYDROCHLORIDE 30 ML: 5 INJECTION, SOLUTION EPIDURAL; INFILTRATION; PERINEURAL at 13:18

## 2018-06-14 NOTE — H&P (VIEW-ONLY)
Orthopaedic Surgery - Office Note  Jacob Mari (49 y o  male)   : 1970   MRN: [id number]  Encounter Date: 2018    Chief Complaint   Patient presents with    Right Knee - Pain       Assessment / Plan  L knee meniscus tear    · The diagnosis and treatment options were reviewed  · The patient wishes to proceed with L knee arthroscopy with partial menisectomy  · The risks, benefits, and alternatives were discussed  · Written consent was obtained  Return for Recheck after sx  History of Present Illness  Jacob Mari is a 49 y o  male who presents with L knee pain  Pt states that since we saw him last in December his knee pain  Pt states that he waited to get his MRI done until this past month due to his pain being manageable until the past month or so  Pt c/o movement in his knee and feels like his knee wants to lock on him  Pr pain radiates down into his ankle  Pt describes that going down steps causes him the most pain  Pt has started to wear a knee brace which he said gives him partial relief  Pt denies numbness and tingling  Pt is here today to review his MRI  Review of Systems  Pertinent items are noted in HPI  All other systems were reviewed and are negative  Physical Exam  BP (!) 160/102   Pulse 83   Ht 5' 8" (1 727 m)   Wt 63 5 kg (140 lb)   BMI 21 29 kg/m²   Cons: Appears well  No apparent distress  Psych: Alert  Oriented x3  Mood and affect normal   Eyes: PERRLA, EOMI  Resp: Normal effort  No audible wheezing or stridor  CV: Palpable pulse  No discernable arrhythmia  No LE edema  Lymph:  No palpable cervical, axillary, or inguinal lymphadenopathy  Skin: Warm  No palpable masses  No visible lesions  Neuro: Normal muscle tone  Normal and symmetric DTR's  Left Knee Exam  Alignment:  Normal knee alignment  Inspection:  mild anterior knee swelling  Palpation:  medial femoral epicondyle and medial joint line  tenderness  ROM:  Knee Extension 0   Knee Flexion 110  Strength:  5/5 quadriceps and hamstrings  Stability:  pain with valgus stress without instability  Tests:  (+) Landy  Patella:  Patella tracks centrally without crepitus  Neurovascular:  Sensation intact in DP/SP/Vazquez/Sa/T nerve distributions  2+ DP & PT pulses  Gait:  Antalgic  Studies Reviewed  MRI of left knee - shows a complex posterior horn medial meniscus tear    Procedures  No procedures today  Medical, Surgical, Family, and Social History  The patient's medical history, family history, and social history, were reviewed and updated as appropriate  Past Medical History:   Diagnosis Date    A-fib ([address] )     Abdominal pain     Abnormal weight loss     Anxiety     Chronic diarrhea     CKD (chronic kidney disease)     Concussion     Dysphagia     Epilepsy (HCC)     GERD (gastroesophageal reflux disease)     Hypertension     Hypokalemia     Irregular heart beat     Irritable bowel syndrome     Lupus anticoagulant disorder (HCC)     Memory loss     Pancreatic insufficiency     Renal disorder     stage 3 renal failure       Past Surgical History:   Procedure Laterality Date    COLONOSCOPY      FRACTURE SURGERY      HERNIA REPAIR      KNEE ARTHROSCOPY      MD EDG US EXAM SURGICAL ALTER STOM DUODENUM/JEJUNUM N/A 5/7/2018    Procedure: RADIAL ENDOSCOPIC U/S;  Surgeon: Cynthia Roberts MD;  Location:  GI LAB; Service: Gastroenterology    MD ESOPHAGOGASTRODUODENOSCOPY TRANSORAL DIAGNOSTIC N/A 12/29/2017    Procedure: EGD WITH PUSH ENTROSCOPE AND COLONOSCOPY;  Surgeon: Cynthia Roberts MD;  Location: [facility] GI LAB; Service: Gastroenterology    SHOULDER SURGERY         Family History   Problem Relation Age of Onset    No Known Problems Mother     No Known Problems Father        Social History     Occupational History    Not on file       Social History Main Topics    Smoking status: Current Every Day Smoker     Packs/day: 1 00     Types: Cigarettes    Smokeless tobacco: Never Used    Alcohol use Yes      Comment: socially    Drug use: No    Sexual activity: Yes       Allergies   Allergen Reactions    Codeine Headache     Reaction Date: 14Sep2011;          Current Outpatient Prescriptions:     aspirin 81 MG tablet, Take 81 mg by mouth daily  , Disp: , Rfl:     carvedilol (COREG) 12 5 mg tablet, Take 12 5 mg by mouth 2 (two) times a day  , Disp: , Rfl:     chlorthalidone 25 mg tablet, Take 25 mg by mouth daily, Disp: , Rfl:     dicyclomine (BENTYL) 20 mg tablet, Take 1 tablet (20 mg total) by mouth 3 (three) times a day as needed (pain), Disp: 60 tablet, Rfl: 3    NIFEdipine ER (ADALAT CC) 30 MG 24 hr tablet, Take 30 mg by mouth 2 (two) times a day, Disp: , Rfl:     ondansetron (ZOFRAN) 4 mg tablet, Take 1 tablet (4 mg total) by mouth every 8 (eight) hours as needed for nausea or vomiting, Disp: 20 tablet, Rfl: 0    Pancrelipase, Lip-Prot-Amyl, (CREON) 83120 units CPEP, Take 2 capsules (72,000 Units total) by mouth 3 (three) times a day with meals, Disp: 270 capsule, Rfl: 5    potassium chloride (K-DUR) 10 mEq tablet, Take by mouth 2 (two) times a day, Disp: , Rfl:     Pancrelipase, Lip-Prot-Amyl, (ZENPEP) 36063-217835 units CPEP, Take 2 capsules by mouth 3 (three) times a day with meals, Disp: 270 capsule, Rfl: 5      Savannah Jean    Scribe Attestation    I,:   Cosmo Garza am acting as a scribe while in the presence of the attending physician :        I,:   Bianca Sol MD personally performed the services described in this documentation    as scribed in my presence :

## 2018-06-14 NOTE — DISCHARGE INSTRUCTIONS
POSTOPERATIVE INSTRUCTIONS following KNEE SURGERY    MEDICATIONS:  · Resume all home medications unless otherwise instructed by your surgeon  · Pain Medication:  Oxycodone 5 mg, 1-3 tablets every 3 hours as needed  · If you were given a regional anesthetic (nerve block), please begin taking the pain medication as soon as you get home, even if you have minimal or no pain  DO NOT WAIT FOR THE NERVE BLOCK TO WEAR OFF  · Possible side effects include nausea, constipation, and urinary retention  If you experience these side effects, please call our office for assistance  · Pain med refills are authorized only during office hours (8am-4pm, Mon-Fri)  · Anti-Inflammatory:  Naproxen 500 mg, 1 tablet every 12 hours for 4 weeks  · Take with food  Stop if you experience nausea, reflux, or stomach pain  · Nausea Medication:  Promethazine 12 5 mg, 1 tablet every 6 hours as needed  · Fill prescription ONLY if you expericnce severe nausea  · Blood Clot Prevention:  Not Necessary  · Pump your foot up and down 20 times per hour while you are less mobile  WOUND CARE:  · Keep the dressing clean and dry  Light drainage may occur the first 2 days postop  · You may remove the dressings and get the incision wet in the shower 72 hours after surgery  DO NOT remove steri-strips or sutures  DO NOT immerse the incision under water  Carefully pat the incision dry  If there is wound drainage, re-apply a fresh dry gauze dressing  · Please call our office ([phone]) if you experience either of the following:  · Sudden increase in swelling, redness, or warmth at the surgical site  · Excessive incisional drainage that persists beyond the 3rd day after surgery  · Oral temperature greater than 101 degrees, not relieved with Tylenol  · Shortness of breath, chest pain, nausea, or any other concerning symptoms    SWELLING CONTROL:  · Cold Therapy:   The cold therapy device may be used either continuously or only as needed, according to your preference  Do not let the pad directly touch your skin  Alternatively, apply ice (20 min on, 20 min off) as often as you feel is necessary  · Elevation:  Elevate the entire leg above heart level  Place pillows under your ankle to keep your knee straight  · Compression:  Apply ACE wraps or a thigh-length compression stocking as needed  RANGE OF MOTION:  · You are allowed FULL RANGE OF MOTION as tolerated  IMMOBILIZATION:  · None  You are allowed full range of motion as tolerated  ACTIVITY:   · BEAR FULL WEIGHT AS TOLERATED on the operative leg  Use crutches to assist only as needed  · Using Crutches on Stairs:  Going up, lead with your "good" (nonoperative) leg  Going down, lead with your "bad" (operative) leg  Use a hand rail when available  · Knee Extension:  Place a rolled towel or pillow under your ankle for 20-30 minutes 3-5 times per day  This will help to maintain full knee extension  · Quad Sets:  Sit or lie with your knee straight  Tighten your quadriceps (front thigh) muscle  Hold for 3 seconds, then relax  Repeat 20 times per hour while awake  PHYSICAL THERAPY:  · You will not need physical therapy  FOLLOW-UP APPOINTMENT:  · 4-5 days after surgery  with:    [facility]  [address] [facility], [address] [place] [address], [address] [facility]  [phone] ([facility])                                                                                                            [phone] (After-Hours)      How to Stop Smoking   WHAT YOU NEED TO KNOW:   You will improve your health and the health of others around you if you stop smoking  Your risk for heart and lung disease, cancer, stroke, heart attack, and vision problems will also decrease  You can benefit from quitting no matter how long you have smoked  DISCHARGE INSTRUCTIONS:   Prepare to stop smoking:  Nicotine is a highly addictive drug found in cigarettes   Withdrawal symptoms can happen when you stop smoking and make it hard to quit  These include anxiety, depression, irritability, trouble sleeping, and increased appetite  You increase your chances of success if you prepare to quit  · Set a quit date  [place] a date that is within the next 2 weeks  Do not pick a day that you think may be stressful or busy  Write down the day or [place] it on your calender  · Tell friends and family that you plan to quit  Explain that you may have withdrawal symptoms when you try to quit  Ask them to support you  They may be able to encourage you and help reduce your stress to make it easier for you to quit  · Make a list of your reasons for quitting  Put the list somewhere you will see it every day, such as your refrigerator  You can look at the list when you have a craving  · Remove all tobacco and nicotine products from your home, car, and workplace  Also, remove anything else that will tempt you to smoke, such as lighters, matches, or ashtrays  Clean your car, home, and places at work that smell like smoke  The smell of smoke can trigger a craving  · Identify triggers that make you want to smoke  This may include activities, feelings, or people  Also write down 1 way you can deal with each of your triggers  For example, if you want to smoke as soon as you wake up, plan another activity during this time, such as exercise  · Make a plan for how you will quit  Learn about the tools that can help you quit, such as medicine, counseling, or nicotine replacement therapy  Choose at least 2 options to help you quit  Tools to help you stop smoking:   · Counseling  from a trained healthcare provider can provide you with support and skills to quit smoking  The provider will also teach you to manage your withdrawal symptoms and cravings  You may receive counseling from one counselor, in group therapy, or through phone therapy called a quit line       · Nicotine replacement therapy (NRT)  such as nicotine patches, gum, or lozenges may help reduce your nicotine cravings  You may get these without a doctor's order  Do not use e-cigarettes or smokeless tobacco in place of cigarettes or to help you quit  They still contain nicotine  · Prescription medicines  such as nasal sprays or nicotine inhalers may help reduce your withdrawal symptoms  Other medicines may also be used to reduce your urge to smoke  Ask your healthcare provider about these medicines  You may need to start certain medicines 2 weeks before your quit date for them to work well  · Hypnosis  is a practice that helps guide you through thoughts and feelings  Hypnosis may help decrease your cravings and make you more willing to quit  · Acupuncture therapy  uses very thin needles to balance energy channels in the body  This is thought to help decrease cravings and symptoms of nicotine withdrawal      · Support groups  let you talk to others who are trying to quit or have already quit  It may be helpful to speak with others about how they quit  Manage your cravings:   · Avoid situations, people, and places that tempt you to smoke  Go to nonsmoking places, such as libraries or restaurants  Understand what tempts you and try to avoid these things  · Keep your hands busy  Hold things such as a stress ball or pen  · Put candy or toothpicks in your mouth  Keep lollipops, sugarless gum, or toothpicks with you at all times  · Do not have alcohol or caffeine  These drinks may tempt you to smoke  Drink healthy liquids such as water or juice instead  · Reward yourself when you resist your cravings  Rewards will motivate you and help you stay positive  · Do an activity that distracts you from your craving  Examples include going for a walk, exercising, or cleaning  Prevent weight gain after you quit:  You may gain a few pounds after you quit smoking  It is healthier for you to gain a few pounds than to continue to smoke  The following can help you prevent weight gain:  · Eat healthy foods  These include fruits, vegetables, whole-grain breads, low-fat dairy products, beans, lean meats, and fish  Eat healthy snacks, such as low-fat yogurt, if you get hungry between meals  · Drink water before, during, and between meals  This will make your stomach feel full and help prevent you from overeating  Ask your healthcare provider how much liquid to drink each day and which liquids are best for you  · Exercise  Take a walk or do some kind of exercise every day  Ask your healthcare provider what exercise is right for you  This may help reduce your cravings and reduce stress  For support and more information:   · [employer]  gov  Phone: 6- 175 - 435-7146  Web Address: www [employer]  © 2017 [address] Information is for End User's use only and may not be sold, redistributed or otherwise used for commercial purposes  All illustrations and images included in CareNotes® are the copyrighted property of [employer] , [employer]  or Loco Atkins  The above information is an  only  It is not intended as medical advice for individual conditions or treatments  Talk to your doctor, nurse or pharmacist before following any medical regimen to see if it is safe and effective for you

## 2018-06-14 NOTE — ANESTHESIA PROCEDURE NOTES
Peripheral Block    Patient location during procedure: holding area  Start time: 6/14/2018 1:17 PM  Removal time: 6/14/2018 1:19 PM  Reason for block: post-op pain management  Staffing  Anesthesiologist: Viridiana Wild  Performed: anesthesiologist   Preanesthetic Checklist  Completed: patient identified, site marked, surgical consent, pre-op evaluation, timeout performed, IV checked, risks and benefits discussed and monitors and equipment checked  Peripheral Block  Patient position: supine  Prep: ChloraPrep  Patient monitoring: frequent blood pressure checks and continuous pulse ox  Block type:  Intra-articular  Needle  Needle gauge: 22 G  Needle localization: anatomical landmarks  Assessment  Injection assessment: incremental injection, negative aspiration for heme and no paresthesia on injection  patient tolerated the procedure well with no immediate complications

## 2018-06-14 NOTE — OP NOTE
OPERATIVE REPORT    PATIENT NAME: Russell Starr   :  1970  MRN: [id number]  Pt Location: [facility] OR ROOM 01    SURGERY DATE: 2018    SURGEON(S) and ROLE:  Primary: Mireille Massey MD  Assisting: Fabi Maki PA-C    NOTE:  The presence of a physician assistant was necessary to help with patient positioning, surgical exposure, wound retraction, wound closure, and other key portions of the procedure  No qualified resident was available for this case  PREOPERATIVE DIAGNOSES:  Left Knee  Medial Meniscus Tear    POSTOPERATIVE DIAGNOSES:  Left Knee  Medial Meniscus Tear    PROCEDURES:  Left Knee Arthroscopy with:  Partial Medial Meniscectomy      ANESTHESIA TYPE:  General LMA and Intra-articular block    ANESTHESIA STAFF:   Anesthesiologist: Yolette Simpson DO  CRNA: Esteban Washington CRNA    ESTIMATED BLOOD LOSS:  2 mL    TOURNIQUET TIME:  Not used    PERIOPERATIVE ANTIBIOTICS:  cefazolin, 2 grams    IMPLANTS:  none    * No implants in log *    SPECIMENS:  * No specimens in log *    DRAINS:  None      OPERATIVE INDICATIONS:  The patient is a 49 y o  male with left knee pain due to a medial meniscus tear  Surgical treatment was indicated due to persistent symptoms despite non-surgical treatment  After a thorough discussion of the potential risks, benefits, and alternative treatments, the patient agreed to proceed with surgery  The patient understands that the risks of surgery include, but are not limited to: infection, neurovascular injury, wound healing complications, venous thromboembolism, persistent pain, stiffness, instability, recurrence of symptoms, potential need for additional surgeries, and loss of limb or life  Oral and written consent for surgery was obtained from the patient preoperatively        EXAM UNDER ANESTHESIA:  Neutral alignment  ROM:  0-135 degrees  Ligaments stable to varus stress / valgus stress / Lachman / posterior drawer; negative pivot-shift  Patella tracking normal without crepitus  PROCEDURE AND TECHNIQUE:  On the day of surgery, the patient was identified in the preoperative holding area  The operative site was marked by the surgeon  The patient was taken into the operating room  A time-out was conducted to confirm the patient's identity, the operative site, and the proposed procedure  The patient was anesthetized, and perioperative antibiotics were administered  The patient was positioned supine on the OR table  All bony prominences were padded  A tourniquet was not used  The operative site was prepped and draped using standard sterile technique  An anterolateral portal was established, and the arthroscope was inserted into the knee joint  An anteromedial portal was established under direct visualization, and diagnostic arthroscopy was performed  The joint demonstrated mild synovitis  In the patellofemoral compartment, the trochlea demonstrated pristine articular cartilage  The patella demonstrated pristine articular cartilage  The patella tracking was normal        In the medial compartment, the medial femoral condyle demonstrated pristine articular cartilage  The medial tibial plateau demonstrated pristine articular cartilage  The medial meniscus had a complex tear involving the posterior horn and body  The torn portion of the meniscus was removed and debrided to a stable base with a basket and motorized shaver  25% of the meniscus width remained intact  In the lateral compartment, the lateral femoral condyle demonstrated pristine articular cartilage  The lateral tibial plateau demonstrated pristine articular cartilage  The lateral meniscus was intact       In the intercondylar notch, the PCL was intact  The ACL was intact  At the conclusion of the procedure, the instruments were withdrawn  The portals and incisions were closed with absorbable sutures and steri-strips  A sterile dressing was applied  The surgical drapes were removed  A soft bandage was applied to the operative knee  The patient was awakened from anesthesia and transported to the PACU in stable condition        COMPLICATIONS:  None    PATIENT DISPOSITION:  PACU       SIGNATURE:  Tommie Villanueva MD  DATE:  June 14, 2018  TIME:  2:52 PM

## 2018-06-27 ENCOUNTER — OFFICE VISIT (OUTPATIENT)
Dept: HEMATOLOGY ONCOLOGY | Facility: CLINIC | Age: 48
End: 2018-06-27
Payer: COMMERCIAL

## 2018-06-27 ENCOUNTER — ANESTHESIA EVENT (OUTPATIENT)
Dept: GASTROENTEROLOGY | Facility: HOSPITAL | Age: 48
End: 2018-06-27

## 2018-06-27 VITALS
HEART RATE: 69 BPM | DIASTOLIC BLOOD PRESSURE: 98 MMHG | TEMPERATURE: 98.9 F | SYSTOLIC BLOOD PRESSURE: 192 MMHG | BODY MASS INDEX: 21.25 KG/M2 | OXYGEN SATURATION: 99 % | HEIGHT: 68 IN | WEIGHT: 140.2 LBS | RESPIRATION RATE: 16 BRPM

## 2018-06-27 DIAGNOSIS — R76.0 LUPUS ANTICOAGULANT POSITIVE: Primary | ICD-10-CM

## 2018-06-27 PROBLEM — D68.61 ANTIPHOSPHOLIPID ANTIBODY SYNDROME (HCC): Status: RESOLVED | Noted: 2017-06-07 | Resolved: 2018-06-27

## 2018-06-27 PROCEDURE — 99213 OFFICE O/P EST LOW 20 MIN: CPT | Performed by: INTERNAL MEDICINE

## 2018-06-27 NOTE — TELEPHONE ENCOUNTER
Pt called back to request letter with diagnosis and treatment of care  He would like a call once it has been completed

## 2018-06-27 NOTE — PROGRESS NOTES
Hematology Outpatient Follow - Up Note  Zena Nobles 49 y o  male MRN: @ Encounter: [id number]        Date:  6/27/2018        Assessment/ Plan:   No evidence of cardiolipin antibodies, or lupus anticoagulant antibodies, he has normal PT, PTT, from Hematology point of view he does not need to come back here, follow up on as-needed basis           HPI:  25-year-old  male with history of polycystic kidney disease, chronic kidney disease grade 3, GERD, hypertension, anxiety, pulmonary nodules, heavy drinking, colitis, he was found to have positive lupus anticoagulant antibodies that was not consistent with subsequent repeat, he had multiple knee arthroscopic procedures, left inguinal hernia repair, multiple shoulder surgeries, there was no evidence of previous history of thrombosis, PE or DVT, he used to drink 10 cans of beer every few days, he used to smoke 1 pack of cigarettes daily for many years  No family history of cancer or blood disorders  He was diagnosed with fatty infiltration of the liver and pancreatic insufficiency            Previous Treatment:         Test Results:    Imaging: No results found      Labs:   Lab Results   Component Value Date    WBC 3 84 (L) 06/07/2018    HGB 15 5 06/07/2018    HCT 44 3 06/07/2018    MCV 95 06/07/2018     (L) 06/07/2018     Lab Results   Component Value Date     06/07/2018    K 3 8 06/07/2018     06/07/2018    CO2 30 06/07/2018    ANIONGAP 7 06/07/2018    BUN 18 06/07/2018    CREATININE 1 12 06/07/2018    GLUCOSE 86 06/07/2018    GLUF 81 02/19/2018    CALCIUM 8 9 06/07/2018    AST 22 11/07/2017    ALT 24 11/07/2017    ALKPHOS 87 11/07/2017    PROT 8 8 (H) 11/07/2017    PROT 8 5 (H) 11/07/2017    BILITOT 1 05 (H) 11/07/2017    EGFR 77 06/07/2018       No results found for: IRON, TIBC, FERRITIN    Lab Results   Component Value Date    CQGTQWFU24 388 06/08/2016         ROS:   Review of Systems   Constitutional: Negative for activity change, appetite change, chills, diaphoresis, fatigue, fever and unexpected weight change  HENT: Negative for congestion, dental problem, facial swelling, hearing loss, mouth sores, nosebleeds, postnasal drip, rhinorrhea, sore throat, trouble swallowing and voice change  Eyes: Negative for photophobia, pain, discharge, redness, itching and visual disturbance  Respiratory: Negative for cough, choking, chest tightness, shortness of breath and wheezing  Cardiovascular: Negative for chest pain, palpitations and leg swelling  Gastrointestinal: Positive for diarrhea  Negative for abdominal distention, abdominal pain, anal bleeding, blood in stool, constipation, nausea, rectal pain and vomiting  Endocrine: Negative for cold intolerance and heat intolerance  Genitourinary: Negative for decreased urine volume, difficulty urinating, dysuria, flank pain, frequency, hematuria and urgency  Musculoskeletal: Negative for arthralgias, back pain, gait problem, joint swelling, myalgias, neck pain and neck stiffness  Skin: Negative for color change, pallor, rash and wound  Allergic/Immunologic: Negative for immunocompromised state  Neurological: Negative for dizziness, tremors, seizures, syncope, facial asymmetry, speech difficulty, weakness, light-headedness, numbness and headaches  Hematological: Negative for adenopathy  Does not bruise/bleed easily  Psychiatric/Behavioral: Negative for agitation, confusion, decreased concentration, dysphoric mood and sleep disturbance  The patient is not nervous/anxious  All other systems reviewed and are negative  Current Medications: Reviewed  Allergies: Reviewed  PMH/FH/SH:  Reviewed      Physical Exam:    Body surface area is 1 76 meters squared      Wt Readings from Last 3 Encounters:   06/27/18 63 6 kg (140 lb 3 2 oz)   06/14/18 63 kg (139 lb)   06/13/18 63 kg (139 lb)        Temp Readings from Last 3 Encounters:   06/27/18 98 9 °F (37 2 °C)   06/14/18 98 1 °F (36 7 °C)   06/13/18 97 7 °F (36 5 °C) (Tympanic)        BP Readings from Last 3 Encounters:   06/27/18 (!) 192/98   06/14/18 162/89   06/13/18 130/90         Pulse Readings from Last 3 Encounters:   06/27/18 69   06/14/18 80   06/13/18 77        Physical Exam   Constitutional: He is oriented to person, place, and time  He appears well-developed and well-nourished  No distress  Scar formation on the lateral aspect of the face bilaterally   HENT:   Head: Normocephalic and atraumatic  Mouth/Throat: Oropharynx is clear and moist  No oropharyngeal exudate  Eyes: Conjunctivae and EOM are normal  Pupils are equal, round, and reactive to light  Neck: Normal range of motion  Neck supple  No tracheal deviation present  No thyromegaly present  Cardiovascular: Normal rate and regular rhythm  Exam reveals no gallop and no friction rub  No murmur heard  Pulmonary/Chest: Effort normal and breath sounds normal  No respiratory distress  He has no wheezes  He has no rales  He exhibits no tenderness  Abdominal: Soft  Bowel sounds are normal  He exhibits no distension and no mass  There is no tenderness  There is no rebound and no guarding  Musculoskeletal: Normal range of motion  Lymphadenopathy:     He has no cervical adenopathy  Neurological: He is alert and oriented to person, place, and time  Skin: Skin is warm and dry  No rash noted  He is not diaphoretic  No erythema  No pallor  Psychiatric: He has a normal mood and affect  His behavior is normal  Judgment and thought content normal    Vitals reviewed  Goals and Barriers:  Current Goal: Minimize effects of disease  Barriers: None  Patient's Capacity to Self Care:  Patient is able to self care      Code Status: [unfilled]

## 2018-06-28 ENCOUNTER — ANESTHESIA (OUTPATIENT)
Dept: GASTROENTEROLOGY | Facility: HOSPITAL | Age: 48
End: 2018-06-28

## 2018-06-28 ENCOUNTER — TELEPHONE (OUTPATIENT)
Dept: INTERNAL MEDICINE CLINIC | Facility: CLINIC | Age: 48
End: 2018-06-28

## 2018-06-28 ENCOUNTER — DOCUMENTATION (OUTPATIENT)
Dept: GASTROENTEROLOGY | Facility: CLINIC | Age: 48
End: 2018-06-28

## 2018-06-28 NOTE — TELEPHONE ENCOUNTER
Letter typed and notes printed  Called patient to inform him that everything is complete  He will be in the office later on today to pick it up

## 2018-06-28 NOTE — TELEPHONE ENCOUNTER
Please let him know we can send a letter with a copy of his last note  For his letter, please state:    "Mr Priyanka Arias has been diagnosed with chronic pancreatitis  Because of his diagnosis he has severe epigastric pain and diarrhea  His diarrhea has responded well to pancreatic enzyme supplementation, but his epigastric pain has not  I have scheduled him for an endoscopic ultrasound with celiac plexus block in the hope this will better manage his pain "    Thanks for writing letter! I'll be in the [facility] office today and I can sign it

## 2019-03-16 NOTE — MISCELLANEOUS
Message   Recorded as Task   Date: 06/14/2016 04:48 PM, Created By: Farheen Kiran   Task Name: Medical Complaint Callback   Assigned To: Dustin Gonsalez   Regarding Patient: Don Mercado, Status: Active   Comment:    Shaista Moreno - 14 Jun 2016 4:48 PM     TASK CREATED  Patient taking Trileptal 600mg in am and 900mg in pm  Patient stated he couldn't handle the double vision so he decided to decreased his dosage himself  I explained to the patient that he should not adjust his medication without speaking to the doc first  He apologized that he did it but he couldn't handle the double vision  He initially called to talk about the EMU admission but when he mentioned this, I wanted to inform you of the dose change  Since he has changed his dose, the patient states the double vision has subsided  I then transferred him to [facility] as she is gone for the day  Dustin Gonsalez - 16 Jun 2016 4:14 PM     TASK REPLIED TO: Previously Assigned To [employer], that's fine  I appreciate your warning him about changing his dose  At this point, really we need to get him into the EMU  Ultimately the Trileptal will have to be changed but often making a change is much better done when we have him on EEG monitoring  Farheen Kiran - 17 Jun 2016 9:31 AM     TASK REASSIGNED: Previously Assigned To Travis Anderson - 17 Jun 2016 10:53 AM     TASK EDITED  called and lmom for pt to call the office back    * 3 tasks open on pt*   Jazzmine Taveras - 17 Jun 2016 2:36 PM     TASK EDITED  Pt made aware of the below, and has his EMU appt scheduled for 7/11          Signatures   Electronically signed by : Cassandra Reagan MD; Jun 22 2016  5:47PM EST                       (Author)
Cardiorespiratory arrest

## 2019-11-15 NOTE — MISCELLANEOUS
Provider Comments  Provider Comments:   No show today for a 45 min  EDX study        Signatures   Electronically signed by : Valeriy Aparicio DO; Jun 1 2016  2:10PM EST                       (Author) Advancement Flap (Single) Text: The defect edges were debeveled with a #15 scalpel blade.  Given the location of the defect and the proximity to free margins a single advancement flap was deemed most appropriate.  Using a sterile surgical marker, an appropriate advancement flap was drawn incorporating the defect and placing the expected incisions within the relaxed skin tension lines where possible.    The area thus outlined was incised deep to adipose tissue with a #15 scalpel blade.  The skin margins were undermined to an appropriate distance in all directions utilizing iris scissors.

## 2024-03-13 NOTE — ANESTHESIA POSTPROCEDURE EVALUATION
Addended by: NADIYA CHAMBERLAIN on: 3/13/2024 02:55 PM     Modules accepted: Orders     Post-Op Assessment Note      CV Status:  Stable    Mental Status:  Alert and awake    Hydration Status:  Euvolemic    PONV Controlled:  Controlled    Airway Patency:  Patent    Post Op Vitals Reviewed:  Yes              BP      Temp      Pulse     Resp      SpO2

## 2025-04-09 NOTE — TELEPHONE ENCOUNTER
Spoke with Court's mother by telephone regarding her interest in a letter documenting Court's anxiety regarding bringing her service dog to public places. Agreed to write a letter that I would address to her that includes Court's dates of service and a statement that one of the areas of focus of her therapy was anxiety regarding bringing her service dog to public places, the nature of that anxiety, and my understanding that Court is no longer using her dog as a service dog and so the problem has resolved.    Spoke with patient, having neck and arm pain with associated numbness/tingling  Will send Medrol dose pack to his pharmacy  He is aware, no need to call him back    Thanks

## (undated) DEVICE — PADDING CAST 6IN COTTON STRL

## (undated) DEVICE — COBAN 6 IN STERILE

## (undated) DEVICE — INTENDED FOR TISSUE SEPARATION, AND OTHER PROCEDURES THAT REQUIRE A SHARP SURGICAL BLADE TO PUNCTURE OR CUT.: Brand: BARD-PARKER ® CARBON RIB-BACK BLADES

## (undated) DEVICE — CHLORAPREP HI-LITE 26ML ORANGE

## (undated) DEVICE — GLOVE SRG BIOGEL 7.5

## (undated) DEVICE — CYSTO TUBING TUR Y IRRIGATION

## (undated) DEVICE — GLOVE INDICATOR PI UNDERGLOVE SZ 8.5 BLUE

## (undated) DEVICE — PUDDLE VAC

## (undated) DEVICE — GLOVE SRG BIOGEL 8

## (undated) DEVICE — SYRINGE 20ML LL

## (undated) DEVICE — IMPERVIOUS STOCKINETTE: Brand: DEROYAL

## (undated) DEVICE — ACE WRAP 6 IN UNSTERILE

## (undated) DEVICE — NEEDLE HYPO 22G X 1-1/2 IN

## (undated) DEVICE — TUBING SUCTION 5MM X 12 FT

## (undated) DEVICE — ABDOMINAL PAD: Brand: DERMACEA

## (undated) DEVICE — BLADE SHAVER DISSECTOR 4MM 13CM COOLCUT

## (undated) DEVICE — 3M™ STERI-STRIP™ REINFORCED ADHESIVE SKIN CLOSURES, R1547, 1/2 IN X 4 IN (12 MM X 100 MM), 6 STRIPS/ENVELOPE: Brand: 3M™ STERI-STRIP™

## (undated) DEVICE — KERLIX BANDAGE ROLL: Brand: KERLIX

## (undated) DEVICE — TUBING EXTENSION 6 FT CONTIN WAVE

## (undated) DEVICE — GAUZE SPONGES,USP TYPE VII GAUZE, 12 PLY: Brand: CURITY

## (undated) DEVICE — SCD SEQUENTIAL COMPRESSION COMFORT SLEEVE MEDIUM KNEE LENGTH: Brand: KENDALL SCD

## (undated) DEVICE — SUT MONOCRYL 2-0 SH 27 IN Y417H

## (undated) DEVICE — 3M™ STERI-DRAPE™ U-DRAPE 1015: Brand: STERI-DRAPE™

## (undated) DEVICE — PACK UNIVERSAL ARTHRSCOPY PBDS